# Patient Record
Sex: FEMALE | Race: WHITE | NOT HISPANIC OR LATINO | Employment: FULL TIME | URBAN - METROPOLITAN AREA
[De-identification: names, ages, dates, MRNs, and addresses within clinical notes are randomized per-mention and may not be internally consistent; named-entity substitution may affect disease eponyms.]

---

## 2017-01-09 ENCOUNTER — OFFICE VISIT (OUTPATIENT)
Dept: OBSTETRICS AND GYNECOLOGY | Facility: CLINIC | Age: 52
End: 2017-01-09
Payer: COMMERCIAL

## 2017-01-09 VITALS
BODY MASS INDEX: 36.93 KG/M2 | DIASTOLIC BLOOD PRESSURE: 72 MMHG | SYSTOLIC BLOOD PRESSURE: 100 MMHG | WEIGHT: 215.19 LBS

## 2017-01-09 DIAGNOSIS — Z01.419 WELL WOMAN EXAM WITH ROUTINE GYNECOLOGICAL EXAM: Primary | ICD-10-CM

## 2017-01-09 DIAGNOSIS — Z12.31 VISIT FOR SCREENING MAMMOGRAM: ICD-10-CM

## 2017-01-09 DIAGNOSIS — Z78.0 POSTMENOPAUSAL STATUS: ICD-10-CM

## 2017-01-09 DIAGNOSIS — Z12.4 PAP SMEAR FOR CERVICAL CANCER SCREENING: ICD-10-CM

## 2017-01-09 PROCEDURE — 99396 PREV VISIT EST AGE 40-64: CPT | Mod: S$GLB,,, | Performed by: OBSTETRICS & GYNECOLOGY

## 2017-01-09 PROCEDURE — 88175 CYTOPATH C/V AUTO FLUID REDO: CPT | Performed by: PATHOLOGY

## 2017-01-09 PROCEDURE — 99999 PR PBB SHADOW E&M-EST. PATIENT-LVL III: CPT | Mod: PBBFAC,,, | Performed by: OBSTETRICS & GYNECOLOGY

## 2017-01-09 PROCEDURE — 88141 CYTOPATH C/V INTERPRET: CPT | Mod: ,,, | Performed by: PATHOLOGY

## 2017-01-09 RX ORDER — PRAVASTATIN SODIUM 10 MG/1
10 TABLET ORAL DAILY
Refills: 3 | COMMUNITY
Start: 2016-12-19 | End: 2020-10-09 | Stop reason: SDUPTHER

## 2017-01-09 RX ORDER — TRAZODONE HYDROCHLORIDE 50 MG/1
50 TABLET ORAL NIGHTLY PRN
Refills: 3 | COMMUNITY
Start: 2016-12-13 | End: 2020-05-13

## 2017-01-09 NOTE — PROGRESS NOTES
Jacqui Leonard is a 51 y.o. year old  female who presents for routine GYN exam.  She is postmenopausal, not on HRT.  No bleeding.  Reports occasional hot flashes, mood swings.  S/P LEEP  with normal paps since.  Denies changes in her medical / surgical history over the past year.        Past Medical History   Diagnosis Date    Abnormal Pap smear     Chronic kidney disease     Depression     GERD (gastroesophageal reflux disease)     Hypertension        Past Surgical History   Procedure Laterality Date    Cervical biopsy  w/ loop electrode excision      Tubal ligation         OB History      Para Term  AB TAB SAB Ectopic Multiple Living    1 1                    ROS:  GENERAL: Feeling well overall.   SKIN: Denies rash or lesions.   HEAD: Denies head injury or headache.   NODES: Denies enlarged lymph nodes.   CHEST: Denies chest pain or shortness of breath.   CARDIOVASCULAR: Denies palpitations or left sided chest pain.   ABDOMEN: No abdominal pain, nausea, vomiting or rectal bleeding.   URINARY: No dysuria or hematuria.  REPRODUCTIVE: See HPI.   BREASTS: Denies pain, lumps, or nipple discharge.   HEMATOLOGIC: No easy bruisability or excessive bleeding.   MUSCULOSKELETAL: Denies joint pain.  NEUROLOGIC: Denies syncope or weakness.   PSYCHIATRIC: Reports mood swings.     PE:   (chaperone present during entire exam)  APPEARANCE: Well nourished, well developed, in no acute distress.  BREASTS: Symmetrical, no skin changes or visible lesions. No palpable masses, nipple discharge or adenopathy bilaterally.  ABDOMEN: Soft. No tenderness or masses. No hernias. No CVA tenderness.  VULVA: No lesions. Normal female genitalia.  URETHRAL MEATUS: Normal size and location, no lesions, no prolapse.  URETHRA: No masses, tenderness, prolapse or scarring.  VAGINA: No lesions, no discharge, no significant cystocele or rectocele.  CERVIX: No lesions and discharge. Stenotic. PAP done.  UTERUS: Normal  "size, regular shape, mobile, non-tender, bladder base nontender.  ADNEXA: No masses, tenderness or CDS nodularity.  ANUS PERINEUM: Normal.    Diagnosis:  1. Well woman exam with routine gynecological exam    2. Postmenopausal status    3. Pap smear for cervical cancer screening    4. Visit for screening mammogram          PLAN:    Orders Placed This Encounter    Mammo Digital Screening Bilat with Tomosynthesis CAD    Liquid-based pap smear, screening       Patient was counseled today on postmenopausal issues.  We reviewed various treatment options for her menopausal symptoms: 1) no treatment  2) non-hormonal options- [place]  3) " natural" hormones  4) HRT: risks / benefits / studies, [facility].  She plans to try Black Cohosh and will let us know her progress.      Follow-up in 1 year.  "

## 2017-01-10 ENCOUNTER — HOSPITAL ENCOUNTER (OUTPATIENT)
Dept: RADIOLOGY | Facility: HOSPITAL | Age: 52
Discharge: HOME | End: 2017-01-10
Attending: OBSTETRICS & GYNECOLOGY
Payer: COMMERCIAL

## 2017-01-10 DIAGNOSIS — Z12.31 VISIT FOR SCREENING MAMMOGRAM: ICD-10-CM

## 2017-01-10 PROCEDURE — 77067 SCR MAMMO BI INCL CAD: CPT | Mod: TC

## 2017-01-10 PROCEDURE — 77067 SCR MAMMO BI INCL CAD: CPT | Mod: 26,,, | Performed by: RADIOLOGY

## 2017-01-11 ENCOUNTER — PATIENT MESSAGE (OUTPATIENT)
Dept: OBSTETRICS AND GYNECOLOGY | Facility: CLINIC | Age: 52
End: 2017-01-11

## 2017-01-19 ENCOUNTER — TELEPHONE (OUTPATIENT)
Dept: OBSTETRICS AND GYNECOLOGY | Facility: CLINIC | Age: 52
End: 2017-01-19

## 2017-01-19 NOTE — TELEPHONE ENCOUNTER
----- Message from Skylar Wilkins sent at 1/19/2017  1:49 PM CST -----  Contact: Patient  x_ 1st Request  _ 2nd Request  _ 3rd Request    Who: ALCIRA AGUILAR [[id number]]    Why: Patient is returning a call from Dr. Lu. Patient is needing a call back from staff.    What Number to Call Back: Patient can be reached at [phone].    When to Expect a call back: (Before the end of the day)  -- if call after 3:00 call back will be tomorrow.

## 2017-01-24 ENCOUNTER — TELEPHONE (OUTPATIENT)
Dept: OBSTETRICS AND GYNECOLOGY | Facility: CLINIC | Age: 52
End: 2017-01-24

## 2017-01-24 NOTE — TELEPHONE ENCOUNTER
Called patient:    Discussed results of pap from 1/9/17:  ONIEL    She has history of LEEP 2011.    REC: Colpo - scheduled 2/1/17.

## 2017-02-01 ENCOUNTER — PROCEDURE VISIT (OUTPATIENT)
Dept: OBSTETRICS AND GYNECOLOGY | Facility: CLINIC | Age: 52
End: 2017-02-01
Attending: OBSTETRICS & GYNECOLOGY
Payer: COMMERCIAL

## 2017-02-01 VITALS
WEIGHT: 219.81 LBS | SYSTOLIC BLOOD PRESSURE: 104 MMHG | HEIGHT: 64 IN | DIASTOLIC BLOOD PRESSURE: 76 MMHG | BODY MASS INDEX: 37.52 KG/M2

## 2017-02-01 DIAGNOSIS — Z98.890 HISTORY OF LOOP ELECTRICAL EXCISION PROCEDURE (LEEP): ICD-10-CM

## 2017-02-01 DIAGNOSIS — R87.612 LGSIL ON PAP SMEAR OF CERVIX: Primary | ICD-10-CM

## 2017-02-01 DIAGNOSIS — Z78.0 POSTMENOPAUSAL STATUS: ICD-10-CM

## 2017-02-01 PROCEDURE — 88305 TISSUE EXAM BY PATHOLOGIST: CPT | Mod: 59 | Performed by: PATHOLOGY

## 2017-02-01 PROCEDURE — 57454 BX/CURETT OF CERVIX W/SCOPE: CPT | Mod: S$GLB,,, | Performed by: OBSTETRICS & GYNECOLOGY

## 2017-02-01 PROCEDURE — 88305 TISSUE EXAM BY PATHOLOGIST: CPT | Mod: 26,,, | Performed by: PATHOLOGY

## 2017-02-01 NOTE — PROCEDURES
COLPOSCOPY:    Jacqui Leonard is a 51 y.o. female who presents for a colposcopy secondary to abnormal pap smear.  Pap performed at her annual exam returned LGSIL.  She has history of LEEP performed 2011 with subsequent normal paps until her most recent.  She is postmenopausal without having a period for several years.      UPT is negative.        PRE-COLPOSCOPY PROCEDURE COUNSELING:  Discussed the abnormal pap test findings, HPV, need for colposcopy and possible biopsies to determine a diagnosis and plan of care, treatments available, the minimal risks of bleeding and infection with a colposcopy, alternatives to colposcopy and she agrees to proceed.  Patient was given the opportunity to ask questions and written consent was obtained.        COLPOSCOPY EXAM:   TIME OUT PERFORMED.   Cervix visualized with speculum  Acetic acid applied to cervix  Entire transformation zone seen  Cervix stenotic / scarring closed even with blue os finder  Minimal faint white epithelium noted at 5:00-6:00  Biopsy was taken at 6:00  ECC attempted with brush, but cervix stenotic.     Hemostatic with silver nitrate.  Minimal blood loss.      The speculum was removed.   The patient tolerated the procedure well.    There were no complications.      IMPRESSION:   Abnormal Pap: LGSIL  History of LEEP 2011  Postmenopausal  Cervical stenosis      POST COLPOSCOPY COUNSELING:   Manage post colposcopy cramping with NSAIDs or Tylenol.  Avoid anything in vagina (intercourse, douching, tampons) one week after the procedure.  Report bleeding heavier than a period, worsening pain, fever > 101.0 F, or foul-smelling vaginal discharge.  Importance of follow-up stressed.      FOLLOW-UP:   To call us in several days for biopsy results and to discuss treatment plan.

## 2017-02-06 ENCOUNTER — TELEPHONE (OUTPATIENT)
Dept: OBSTETRICS AND GYNECOLOGY | Facility: CLINIC | Age: 52
End: 2017-02-06

## 2017-02-06 NOTE — TELEPHONE ENCOUNTER
Called patient:    Discussed results of colpo;    FINAL PATHOLOGIC DIAGNOSIS  1. Cervix, 6:00, biopsy:  Mild squamous dysplasia with koilocytic atypia. (FELY I).  2. Endocervix, curettage:  Specimen consists of an extremely scant fragments of keratin debris.  No endocervical component is present for evaluation, therefore the specimen is insufficient for further  diagnosis. Correlate clinically.  Comment:  This case correlates with the patient's previous Pap smear, case number [id number].    Prior LGSIL pap- stenotic cervix.    REC:  Repeat pap in 6 months - I emphasized the importance of follow-up.

## 2017-02-06 NOTE — TELEPHONE ENCOUNTER
----- Message from Morteza Lu MD sent at 2/6/2017 11:54 AM CST -----  Please send reminder letter for pap 8/2017.  Thanks.

## 2017-02-23 RX ORDER — ENALAPRIL MALEATE 10 MG/1
TABLET ORAL
Qty: 90 TABLET | Refills: 0 | OUTPATIENT
Start: 2017-02-23

## 2017-10-28 ENCOUNTER — OFFICE VISIT (OUTPATIENT)
Dept: URGENT CARE | Facility: CLINIC | Age: 52
End: 2017-10-28
Payer: COMMERCIAL

## 2017-10-28 VITALS
TEMPERATURE: 99 F | SYSTOLIC BLOOD PRESSURE: 125 MMHG | HEART RATE: 70 BPM | BODY MASS INDEX: 37.56 KG/M2 | OXYGEN SATURATION: 97 % | DIASTOLIC BLOOD PRESSURE: 71 MMHG | WEIGHT: 220 LBS | HEIGHT: 64 IN | RESPIRATION RATE: 16 BRPM

## 2017-10-28 DIAGNOSIS — J40 BRONCHITIS: Primary | ICD-10-CM

## 2017-10-28 PROCEDURE — 96372 THER/PROPH/DIAG INJ SC/IM: CPT | Mod: S$GLB,,, | Performed by: EMERGENCY MEDICINE

## 2017-10-28 PROCEDURE — 99203 OFFICE O/P NEW LOW 30 MIN: CPT | Mod: 25,S$GLB,, | Performed by: NURSE PRACTITIONER

## 2017-10-28 RX ORDER — BENZONATATE 200 MG/1
200 CAPSULE ORAL 3 TIMES DAILY PRN
Qty: 30 CAPSULE | Refills: 0 | Status: SHIPPED | OUTPATIENT
Start: 2017-10-28 | End: 2017-11-07

## 2017-10-28 RX ORDER — ALBUTEROL SULFATE 90 UG/1
2 AEROSOL, METERED RESPIRATORY (INHALATION) EVERY 6 HOURS PRN
Qty: 18 G | Refills: 0 | Status: SHIPPED | OUTPATIENT
Start: 2017-10-28 | End: 2019-01-15

## 2017-10-28 RX ORDER — BETAMETHASONE SODIUM PHOSPHATE AND BETAMETHASONE ACETATE 3; 3 MG/ML; MG/ML
6 INJECTION, SUSPENSION INTRA-ARTICULAR; INTRALESIONAL; INTRAMUSCULAR; SOFT TISSUE
Status: COMPLETED | OUTPATIENT
Start: 2017-10-28 | End: 2017-10-28

## 2017-10-28 RX ORDER — AZITHROMYCIN 250 MG/1
TABLET, FILM COATED ORAL
Qty: 6 TABLET | Refills: 0 | Status: SHIPPED | OUTPATIENT
Start: 2017-10-28 | End: 2018-01-23

## 2017-10-28 RX ADMIN — BETAMETHASONE SODIUM PHOSPHATE AND BETAMETHASONE ACETATE 6 MG: 3; 3 INJECTION, SUSPENSION INTRA-ARTICULAR; INTRALESIONAL; INTRAMUSCULAR; SOFT TISSUE at 04:10

## 2017-10-28 NOTE — PATIENT INSTRUCTIONS
Please return here or go to the Emergency Department for any concerns or worsening of condition.  If you were prescribed antibiotics, please take them to completion.  If you were prescribed a narcotic medication, do not drive or operate heavy equipment or machinery while taking these medications.  Please follow up with your primary care doctor or specialist as needed.    If you  smoke, please stop smoking.  Bronchitis with Wheezing (Viral or Bacterial: Adult)    Bronchitis is an infection of the air passages. It often occurs during a cold and is usually caused by a virus. Symptoms include cough with mucus (phlegm) and low-grade fever. This illness is contagious during the first few days and is spread through the air by coughing and sneezing, or by direct contact (touching the sick person and then touching your own eyes, nose, or mouth).  If there is a lot of inflammation, air flow is restricted. The air passages may also go into spasm, especially if you have asthma. This causes wheezing and difficulty breathing even in people who do not have asthma.  Bronchitis usually lasts 7 to 14 days. The wheezing should improve with treatment during the first week. An inhaler is often prescribed to relax the air passages and stop wheezing. Antibiotics will be prescribed if your doctor thinks there is also a secondary bacterial infection.  Home care  · If symptoms are severe, rest at home for the first 2 to 3 days. When you go back to your usual activities, don't let yourself get too tired.  · Do not smoke. Also avoid being exposed to secondhand smoke.  · You may use over-the-counter medicine to control fever or pain, unless another medicine was prescribed. Note: If you have chronic liver or kidney disease or have ever had a stomach ulcer or gastrointestinal bleeding, talk with your healthcare provider before using these medicines. Also talk to your provider if you are taking medicine to prevent blood clots.) Aspirin should  never be given to anyone younger than 18 years of age who is ill with a viral infection or fever. It may cause severe liver or brain damage.  · Your appetite may be poor, so a light diet is fine. Avoid dehydration by drinking 6 to 8 glasses of fluids per day (such as water, soft drinks, sports drinks, juices, tea, or soup). Extra fluids will help loosen secretions in the nose and lungs.  · Over-the-counter cough, cold, and sore-throat medicines will not shorten the length of the illness, but they may be helpful to reduce symptoms. (Note: Do not use decongestants if you have high blood pressure.)  · If you were given an inhaler, use it exactly as directed. If you need to use it more often than prescribed, your condition may be worsening. If this happens, contact your healthcare provider.  · If prescribed, finish all antibiotic medicine, even if you are feeling better after only a few days.  Follow-up care  Follow up with your healthcare provider, or as advised. If you had an X-ray or ECG (electrocardiogram), a specialist will review it. You will be notified of any new findings that may affect your care.  Note: If you are age 65 or older, or if you have a chronic lung disease or condition that affects your immune system, or you smoke, talk to your healthcare provider about having a pneumococcal vaccinations and a yearly influenza vaccination (flu shot).  When to seek medical advice  Call your healthcare provider right away if any of these occur:  · Fever of 100.4°F (38°C) or higher  · Coughing up increasing amounts of colored sputum  · Weakness, drowsiness, headache, facial pain, ear pain, or a stiff neck  Call 911, or get immediate medical care  Contact emergency services right away if any of these occur.  · Coughing up blood  · Worsening weakness, drowsiness, headache, or stiff neck  · Increased wheezing not helped with medication, shortness of breath, or pain with breathing  Date Last Reviewed: 9/13/2015  ©  7698-7109 [employer]. [address] [place] [address], [place] [address]. All rights reserved. This information is not intended as a substitute for professional medical care. Always follow your healthcare professional's instructions.

## 2017-10-28 NOTE — PROGRESS NOTES
"Subjective:       Patient ID: Jacqui Leonard is a 51 y.o. female.    Vitals:  height is 5' 4" (1.626 m) and weight is 99.8 kg (220 lb). Her oral temperature is 98.5 °F (36.9 °C). Her blood pressure is 125/71 and her pulse is 70. Her respiration is 16 and oxygen saturation is 97%.     Chief Complaint: Nasal Congestion    Other   This is a new problem. The current episode started 1 to 4 weeks ago. The problem occurs constantly. Associated symptoms include congestion, coughing and a sore throat. Pertinent negatives include no abdominal pain, chest pain, chills, fever, headaches, myalgias or nausea. The symptoms are aggravated by coughing. Treatments tried: otc allergy med. The treatment provided no relief.     Review of Systems   Constitution: Negative for chills, fever and malaise/fatigue.   HENT: Positive for congestion and sore throat. Negative for ear pain and hoarse voice.    Eyes: Negative for discharge and redness.   Cardiovascular: Negative for chest pain, dyspnea on exertion and leg swelling.   Respiratory: Positive for cough and shortness of breath. Negative for sputum production and wheezing.    Musculoskeletal: Negative for myalgias.   Gastrointestinal: Negative for abdominal pain and nausea.   Neurological: Negative for headaches.       Objective:      Physical Exam   Constitutional: She is oriented to person, place, and time. She appears well-developed and well-nourished. She is cooperative.  Non-toxic appearance. She does not appear ill. No distress.   HENT:   Head: Normocephalic and atraumatic.   Right Ear: Hearing, tympanic membrane, external ear and ear canal normal.   Left Ear: Hearing, tympanic membrane, external ear and ear canal normal.   Nose: Nose normal. No mucosal edema, rhinorrhea or nasal deformity. No epistaxis. Right sinus exhibits no maxillary sinus tenderness and no frontal sinus tenderness. Left sinus exhibits no maxillary sinus tenderness and no frontal sinus tenderness. "   Mouth/Throat: Uvula is midline, oropharynx is clear and moist and mucous membranes are normal. No trismus in the jaw. Normal dentition. No uvula swelling. No posterior oropharyngeal erythema.   Eyes: Conjunctivae and lids are normal. No scleral icterus.   Sclera clear bilat   Neck: Trachea normal, full passive range of motion without pain and phonation normal. Neck supple.   Cardiovascular: Normal rate, regular rhythm, normal heart sounds, intact distal pulses and normal pulses.    Pulmonary/Chest: Effort normal. No respiratory distress. She has wheezes in the right upper field and the left upper field.   Abdominal: Soft. Normal appearance and bowel sounds are normal. She exhibits no distension. There is no tenderness.   Musculoskeletal: Normal range of motion. She exhibits no edema or deformity.   Neurological: She is alert and oriented to person, place, and time. She exhibits normal muscle tone. Coordination normal.   Skin: Skin is warm, dry and intact. She is not diaphoretic. No pallor.   Psychiatric: She has a normal mood and affect. Her speech is normal and behavior is normal. Judgment and thought content normal. Cognition and memory are normal.   Nursing note and vitals reviewed.      Assessment:       1. Bronchitis        Plan:         Bronchitis  -     betamethasone acetate-betamethasone sodium phosphate injection 6 mg; Inject 1 mL (6 mg total) into the muscle one time.  -     azithromycin (ZITHROMAX Z-BENJAMIN) 250 MG tablet; TAKE 2 TABLETS ON DAY 1, THEN 1 TABLET DAILY X 4 DAYS.  Dispense: 6 tablet; Refill: 0  -     benzonatate (TESSALON) 200 MG capsule; Take 1 capsule (200 mg total) by mouth 3 (three) times daily as needed for Cough.  Dispense: 30 capsule; Refill: 0  -     albuterol 90 mcg/actuation inhaler; Inhale 2 puffs into the lungs every 6 (six) hours as needed for Wheezing or Shortness of Breath. Rescue  Dispense: 18 g; Refill: 0    Please return here or go to the Emergency Department for any concerns  or worsening of condition.  If you were prescribed antibiotics, please take them to completion.  If you were prescribed a narcotic medication, do not drive or operate heavy equipment or machinery while taking these medications.  Please follow up with your primary care doctor or specialist as needed.    If you  smoke, please stop smoking.

## 2018-01-12 ENCOUNTER — PATIENT MESSAGE (OUTPATIENT)
Dept: NEPHROLOGY | Facility: CLINIC | Age: 53
End: 2018-01-12

## 2018-01-12 NOTE — TELEPHONE ENCOUNTER
Hi.  Let's get her a 40 minute appointment slot since I have not seen her in 2 years.    Thanks!

## 2018-01-23 ENCOUNTER — DOCUMENTATION ONLY (OUTPATIENT)
Dept: NEPHROLOGY | Facility: CLINIC | Age: 53
End: 2018-01-23

## 2018-01-23 ENCOUNTER — OFFICE VISIT (OUTPATIENT)
Dept: NEPHROLOGY | Facility: CLINIC | Age: 53
End: 2018-01-23
Payer: COMMERCIAL

## 2018-01-23 VITALS
HEIGHT: 64 IN | HEART RATE: 82 BPM | SYSTOLIC BLOOD PRESSURE: 114 MMHG | BODY MASS INDEX: 38.35 KG/M2 | WEIGHT: 224.63 LBS | OXYGEN SATURATION: 98 % | DIASTOLIC BLOOD PRESSURE: 71 MMHG

## 2018-01-23 DIAGNOSIS — N18.30 CKD (CHRONIC KIDNEY DISEASE) STAGE 3, GFR 30-59 ML/MIN: Primary | ICD-10-CM

## 2018-01-23 DIAGNOSIS — R80.9 PROTEINURIA, UNSPECIFIED TYPE: ICD-10-CM

## 2018-01-23 PROCEDURE — 99999 PR PBB SHADOW E&M-EST. PATIENT-LVL III: CPT | Mod: PBBFAC,,, | Performed by: INTERNAL MEDICINE

## 2018-01-23 PROCEDURE — 99214 OFFICE O/P EST MOD 30 MIN: CPT | Mod: S$GLB,,, | Performed by: INTERNAL MEDICINE

## 2018-01-23 RX ORDER — SERTRALINE HYDROCHLORIDE 50 MG/1
50 TABLET, FILM COATED ORAL DAILY
Refills: 1 | COMMUNITY
Start: 2018-01-15 | End: 2019-01-15

## 2018-01-23 NOTE — PROGRESS NOTES
"Subjective:       Patient ID: Jacqui Leonard is a 52 y.o. White female who presents for return patient evaluation for chronic renal failure.    She has no uremic or urinary symptoms and is in her usual state of health.  There have been no recent illnesses, hospitalizations or procedures.  I did discuss with her the 30+ pound weigh gain she has had since 12/2014.  She denies NSAID use.      Review of Systems   Constitutional: Positive for fatigue. Negative for appetite change, chills and fever.   Eyes: Negative for visual disturbance.   Respiratory: Positive for shortness of breath (with exertion). Negative for cough.    Cardiovascular: Negative for chest pain and leg swelling.   Gastrointestinal: Negative for nausea.   Genitourinary: Negative for difficulty urinating, dysuria and hematuria.   Musculoskeletal: Positive for arthralgias (L shoulder) and gait problem.   Skin: Negative for rash.   Neurological: Negative for headaches.   Psychiatric/Behavioral: Positive for sleep disturbance.       The past medical, family and social histories were reviewed for this encounter.     /71 (BP Location: Right arm, Patient Position: Sitting)   Pulse 82   Ht 5' 4" (1.626 m)   Wt 101.9 kg (224 lb 10.4 oz)   SpO2 98%   BMI 38.56 kg/m²     Objective:      Physical Exam   Constitutional: She appears well-developed and well-nourished. No distress.   HENT:   Head: Normocephalic and atraumatic.   Eyes: Conjunctivae are normal. No scleral icterus.   Neck: Normal range of motion. No JVD present.   Cardiovascular: Normal rate, regular rhythm and normal heart sounds.  Exam reveals no gallop and no friction rub.    No murmur heard.  Pulmonary/Chest: Effort normal and breath sounds normal. No respiratory distress. She has no wheezes.   Abdominal: Soft. Bowel sounds are normal. She exhibits no distension (obese). There is no tenderness.   Musculoskeletal: She exhibits edema (trace RLE, 1+ LLE).   Skin: Skin is warm and dry. " No rash noted.   Psychiatric: She has a normal mood and affect.   Vitals reviewed.      Assessment:       1. CKD (chronic kidney disease) stage 3, GFR 30-59 ml/min    2. Proteinuria, unspecified type        Plan:   Return to clinic in 3 months.  Labs for next visit include rp, pth, ua, upc, cbc at the [place] location.  Baseline creatinine is 1.2-1.5.  [facility] labs from 1/9/18:  Na 145, Scr 1.8, K 5.3, Cl 105, CO 26, Alb 3.9, Glu 92, WBC 5.4, Hgb 11.1, chol 247, OHD 26.  UA-1.013/5.5/2+ prot.  I asked her to try to get off of her PPI and use pepcid or zantac.  She has been a bit lost to follow-up thus I do not know if her creatinine of 1.8 is isolated or is part of a trend of worsening renal function.  She denies NSAIDs or new medications which may have rocked the boat.

## 2018-02-07 ENCOUNTER — OFFICE VISIT (OUTPATIENT)
Dept: OBSTETRICS AND GYNECOLOGY | Facility: CLINIC | Age: 53
End: 2018-02-07
Attending: OBSTETRICS & GYNECOLOGY
Payer: COMMERCIAL

## 2018-02-07 VITALS
WEIGHT: 227.5 LBS | DIASTOLIC BLOOD PRESSURE: 82 MMHG | SYSTOLIC BLOOD PRESSURE: 122 MMHG | HEIGHT: 64 IN | BODY MASS INDEX: 38.84 KG/M2

## 2018-02-07 DIAGNOSIS — Z12.4 PAP SMEAR FOR CERVICAL CANCER SCREENING: ICD-10-CM

## 2018-02-07 DIAGNOSIS — Z78.0 POSTMENOPAUSAL STATUS: ICD-10-CM

## 2018-02-07 DIAGNOSIS — Z12.31 VISIT FOR SCREENING MAMMOGRAM: ICD-10-CM

## 2018-02-07 DIAGNOSIS — Z01.419 WELL WOMAN EXAM WITH ROUTINE GYNECOLOGICAL EXAM: Primary | ICD-10-CM

## 2018-02-07 DIAGNOSIS — Z98.890 HISTORY OF LOOP ELECTRICAL EXCISION PROCEDURE (LEEP): ICD-10-CM

## 2018-02-07 PROCEDURE — 99396 PREV VISIT EST AGE 40-64: CPT | Mod: S$GLB,,, | Performed by: OBSTETRICS & GYNECOLOGY

## 2018-02-07 PROCEDURE — 99999 PR PBB SHADOW E&M-EST. PATIENT-LVL III: CPT | Mod: PBBFAC,,, | Performed by: OBSTETRICS & GYNECOLOGY

## 2018-02-07 PROCEDURE — 88175 CYTOPATH C/V AUTO FLUID REDO: CPT

## 2018-02-07 NOTE — PROGRESS NOTES
Jacqui Leonard is a 52 y.o. year old  female who presents for routine GYN exam.  She is postmenopausal, not on HRT.  No bleeding.  Infrequent hot flashes.  She has noted weight gain over the past year.  She has a history of FELY I on colpo, followed conservatively.  S/P LEEP .  Denies recent changes in her medical / surgical history.     Summary:   LEEP  17 Pap LGSIL, possible HGSIL  17 Colpo: FELY I      Past Medical History:   Diagnosis Date    Abnormal Pap smear     Chronic kidney disease     Depression     GERD (gastroesophageal reflux disease)     Hypertension        Past Surgical History:   Procedure Laterality Date    CERVICAL BIOPSY  W/ LOOP ELECTRODE EXCISION      FOOT SURGERY      x2    TUBAL LIGATION         OB History      Para Term  AB Living    1 1       1    SAB TAB Ectopic Multiple Live Births                         ROS:  GENERAL: Reports weight gain.   SKIN: Denies rash or lesions.   HEAD: Denies head injury or headache.   NODES: Denies enlarged lymph nodes.   CHEST: Denies chest pain or shortness of breath.   CARDIOVASCULAR: Denies palpitations or left sided chest pain.   ABDOMEN: No abdominal pain, nausea, vomiting or rectal bleeding.   URINARY: No dysuria or hematuria.  REPRODUCTIVE: See HPI.   BREASTS: Denies pain, lumps, or nipple discharge.   HEMATOLOGIC: No easy bruisability or excessive bleeding.   MUSCULOSKELETAL: Denies joint pain.  NEUROLOGIC: Denies syncope or weakness.   PSYCHIATRIC: Denies depression.     PE:   (chaperone present during entire exam)  APPEARANCE: Well nourished, well developed, in no acute distress.  BREASTS: Symmetrical, no skin changes or visible lesions. No palpable masses, nipple discharge or adenopathy bilaterally.  ABDOMEN: Soft. No tenderness or masses. No hernias. No CVA tenderness.  VULVA: No lesions. Normal female genitalia.  URETHRAL MEATUS: Normal size and location, no lesions, no prolapse.  URETHRA: No masses,  tenderness, prolapse or scarring.  VAGINA: Atrophic.  No lesions, no discharge, no significant cystocele or rectocele.  CERVIX: No lesions and discharge. Stenotic / agglutinated.  PAP done.  UTERUS: Normal size, regular shape, mobile, non-tender, bladder base nontender.  ADNEXA: No masses, tenderness or CDS nodularity.  ANUS PERINEUM: Normal.    Diagnosis:  1. Well woman exam with routine gynecological exam    2. Postmenopausal status    3. Pap smear for cervical cancer screening    4. Visit for screening mammogram    5. History of loop electrical excision procedure (LEEP)          PLAN:    Orders Placed This Encounter    Mammo Digital Screening Bilat with Jaylan without CAD    Liquid-based pap smear, screening       Patient was counseled today on postmenopausal issues.  We reviewed her pap / colpo / LEEP history.  If today's pap is normal, she should return in 6 months for another pap.    Follow-up in 6 months for pap / 1 year for annual exam.

## 2018-02-08 ENCOUNTER — APPOINTMENT (OUTPATIENT)
Dept: RADIOLOGY | Facility: OTHER | Age: 53
End: 2018-02-08
Attending: OBSTETRICS & GYNECOLOGY
Payer: COMMERCIAL

## 2018-02-08 DIAGNOSIS — Z12.31 VISIT FOR SCREENING MAMMOGRAM: ICD-10-CM

## 2018-02-08 PROCEDURE — 77067 SCR MAMMO BI INCL CAD: CPT | Mod: 26,,, | Performed by: RADIOLOGY

## 2018-02-08 PROCEDURE — 77067 SCR MAMMO BI INCL CAD: CPT | Mod: TC,PN

## 2018-02-08 PROCEDURE — 77063 BREAST TOMOSYNTHESIS BI: CPT | Mod: 26,,, | Performed by: RADIOLOGY

## 2018-02-09 ENCOUNTER — PATIENT MESSAGE (OUTPATIENT)
Dept: OBSTETRICS AND GYNECOLOGY | Facility: CLINIC | Age: 53
End: 2018-02-09

## 2018-02-14 ENCOUNTER — PATIENT MESSAGE (OUTPATIENT)
Dept: OBSTETRICS AND GYNECOLOGY | Facility: CLINIC | Age: 53
End: 2018-02-14

## 2019-01-15 ENCOUNTER — OFFICE VISIT (OUTPATIENT)
Dept: URGENT CARE | Facility: CLINIC | Age: 54
End: 2019-01-15
Payer: COMMERCIAL

## 2019-01-15 VITALS
OXYGEN SATURATION: 98 % | WEIGHT: 225 LBS | BODY MASS INDEX: 38.41 KG/M2 | HEART RATE: 70 BPM | DIASTOLIC BLOOD PRESSURE: 79 MMHG | SYSTOLIC BLOOD PRESSURE: 115 MMHG | TEMPERATURE: 98 F | HEIGHT: 64 IN | RESPIRATION RATE: 18 BRPM

## 2019-01-15 DIAGNOSIS — J06.9 VIRAL URI WITH COUGH: Primary | ICD-10-CM

## 2019-01-15 LAB
CTP QC/QA: YES
FLUAV AG NPH QL: NEGATIVE
FLUBV AG NPH QL: NEGATIVE

## 2019-01-15 PROCEDURE — 3008F PR BODY MASS INDEX (BMI) DOCUMENTED: ICD-10-PCS | Mod: CPTII,S$GLB,, | Performed by: NURSE PRACTITIONER

## 2019-01-15 PROCEDURE — 96372 PR INJECTION,THERAP/PROPH/DIAG2ST, IM OR SUBCUT: ICD-10-PCS | Mod: S$GLB,,, | Performed by: FAMILY MEDICINE

## 2019-01-15 PROCEDURE — 87804 POCT INFLUENZA A/B: ICD-10-PCS | Mod: QW,S$GLB,, | Performed by: NURSE PRACTITIONER

## 2019-01-15 PROCEDURE — 99214 PR OFFICE/OUTPT VISIT, EST, LEVL IV, 30-39 MIN: ICD-10-PCS | Mod: 25,S$GLB,, | Performed by: NURSE PRACTITIONER

## 2019-01-15 PROCEDURE — 96372 THER/PROPH/DIAG INJ SC/IM: CPT | Mod: S$GLB,,, | Performed by: FAMILY MEDICINE

## 2019-01-15 PROCEDURE — 3008F BODY MASS INDEX DOCD: CPT | Mod: CPTII,S$GLB,, | Performed by: NURSE PRACTITIONER

## 2019-01-15 PROCEDURE — 99214 OFFICE O/P EST MOD 30 MIN: CPT | Mod: 25,S$GLB,, | Performed by: NURSE PRACTITIONER

## 2019-01-15 PROCEDURE — 87804 INFLUENZA ASSAY W/OPTIC: CPT | Mod: QW,S$GLB,, | Performed by: NURSE PRACTITIONER

## 2019-01-15 RX ORDER — AZELASTINE 1 MG/ML
1 SPRAY, METERED NASAL 2 TIMES DAILY
Qty: 30 ML | Refills: 0 | Status: SHIPPED | OUTPATIENT
Start: 2019-01-15 | End: 2020-05-13

## 2019-01-15 RX ORDER — PREDNISONE 20 MG/1
20 TABLET ORAL DAILY
Qty: 3 TABLET | Refills: 0 | Status: SHIPPED | OUTPATIENT
Start: 2019-01-15 | End: 2019-01-18

## 2019-01-15 RX ORDER — CETIRIZINE HYDROCHLORIDE, PSEUDOEPHEDRINE HYDROCHLORIDE 5; 120 MG/1; MG/1
1 TABLET, FILM COATED, EXTENDED RELEASE ORAL 2 TIMES DAILY
Qty: 20 TABLET | Refills: 0 | Status: SHIPPED | OUTPATIENT
Start: 2019-01-15 | End: 2019-01-25

## 2019-01-15 RX ORDER — BETAMETHASONE SODIUM PHOSPHATE AND BETAMETHASONE ACETATE 3; 3 MG/ML; MG/ML
9 INJECTION, SUSPENSION INTRA-ARTICULAR; INTRALESIONAL; INTRAMUSCULAR; SOFT TISSUE
Status: COMPLETED | OUTPATIENT
Start: 2019-01-15 | End: 2019-01-15

## 2019-01-15 RX ADMIN — BETAMETHASONE SODIUM PHOSPHATE AND BETAMETHASONE ACETATE 9 MG: 3; 3 INJECTION, SUSPENSION INTRA-ARTICULAR; INTRALESIONAL; INTRAMUSCULAR; SOFT TISSUE at 12:01

## 2019-01-15 NOTE — PATIENT INSTRUCTIONS
USE ZYRTEC D TWICE A DAY FOR CONGESTION    DO NOT START PREDNISONE UNTIL TOMORROW    USE BOTH NASAL SPRAYS MORNING AND NIGHT (ASTELIN AND FLONASE-OTC)    CONTINUE MUCINEX FOR COUGH- OTC    Viral Upper Respiratory Illness (Adult)  You have a viral upper respiratory illness (URI), which is another term for the common cold. This illness is contagious during the first few days. It is spread through the air by coughing and sneezing. It may also be spread by direct contact (touching the sick person and then touching your own eyes, nose, or mouth). Frequent handwashing will decrease risk of spread. Most viral illnesses go away within 7 to 10 days with rest and simple home remedies. Sometimes the illness may last for several weeks. Antibiotics will not kill a virus, and they are generally not prescribed for this condition.    Home care  · If symptoms are severe, rest at home for the first 2 to 3 days. When you resume activity, don't let yourself get too tired.  · Avoid being exposed to cigarette smoke (yours or others).  · You may use acetaminophen or ibuprofen to control pain and fever, unless another medicine was prescribed. (Note: If you have chronic liver or kidney disease, have ever had a stomach ulcer or gastrointestinal bleeding, or are taking blood-thinning medicines, talk with your healthcare provider before using these medicines.) Aspirin should never be given to anyone under 18 years of age who is ill with a viral infection or fever. It may cause severe liver or brain damage.  · Your appetite may be poor, so a light diet is fine. Avoid dehydration by drinking 6 to 8 glasses of fluids per day (water, soft drinks, juices, tea, or soup). Extra fluids will help loosen secretions in the nose and lungs.  · Over-the-counter cold medicines will not shorten the length of time youre sick, but they may be helpful for the following symptoms: cough, sore throat, and nasal and sinus congestion. (Note: Do not use  decongestants if you have high blood pressure.)  Follow-up care  Follow up with your healthcare provider, or as advised.  When to seek medical advice  Call your healthcare provider right away if any of these occur:  · Cough with lots of colored sputum (mucus)  · Severe headache; face, neck, or ear pain  · Difficulty swallowing due to throat pain  · Fever of 100.4°F (38°C)  Call 911, or get immediate medical care  Call emergency services right away if any of these occur:  · Chest pain, shortness of breath, wheezing, or difficulty breathing  · Coughing up blood  · Inability to swallow due to throat pain  Date Last Reviewed: 9/13/2015  © 3929-4661 [employer]. [address] [place] [address], [place] [address]. All rights reserved. This information is not intended as a substitute for professional medical care. Always follow your healthcare professional's instructions.

## 2019-01-15 NOTE — PROGRESS NOTES
"Subjective:       Patient ID: Jacqui Leonard is a 53 y.o. female.    Vitals:  height is 5' 4" (1.626 m) and weight is 102.1 kg (225 lb). Her temperature is 97.7 °F (36.5 °C). Her blood pressure is 115/79 and her pulse is 70. Her respiration is 18 and oxygen saturation is 98%.     Chief Complaint: Nasal Congestion and Fatigue    Pt reports symptoms began on Thursday and got worse yesterday.       Sinus Problem   This is a new problem. The current episode started yesterday. There has been no fever. Her pain is at a severity of 6/10. The pain is mild. Associated symptoms include congestion and sinus pressure. Pertinent negatives include no diaphoresis. Past treatments include nothing.       Constitution: Positive for fatigue. Negative for sweating.   HENT: Positive for congestion, sinus pain and sinus pressure. Negative for voice change.    Neck: Negative for painful lymph nodes.   Eyes: Negative for eye redness.   Respiratory: Negative for chest tightness, sputum production, COPD, stridor and asthma.    Gastrointestinal: Negative for nausea and vomiting.   Allergic/Immunologic: Negative for seasonal allergies and asthma.   Neurological: Positive for light-headedness.   Hematologic/Lymphatic: Negative for swollen lymph nodes.       Objective:      Physical Exam   Constitutional: She is oriented to person, place, and time. She appears well-developed and well-nourished. She is cooperative.  Non-toxic appearance. She does not appear ill. No distress.   HENT:   Head: Normocephalic and atraumatic.   Right Ear: Hearing, external ear and ear canal normal. A middle ear effusion is present.   Left Ear: Hearing, external ear and ear canal normal. A middle ear effusion is present.   Nose: Mucosal edema and rhinorrhea present. No nasal deformity. No epistaxis. Right sinus exhibits no maxillary sinus tenderness and no frontal sinus tenderness. Left sinus exhibits no maxillary sinus tenderness and no frontal sinus tenderness. "   Mouth/Throat: Uvula is midline, oropharynx is clear and moist and mucous membranes are normal. No trismus in the jaw. Normal dentition. No uvula swelling. No oropharyngeal exudate, posterior oropharyngeal edema or posterior oropharyngeal erythema.   Eyes: Conjunctivae and lids are normal. No scleral icterus.   Sclera clear bilat   Neck: Trachea normal, full passive range of motion without pain and phonation normal. Neck supple.   Cardiovascular: Normal rate, regular rhythm, normal heart sounds, intact distal pulses and normal pulses.   Pulmonary/Chest: Effort normal and breath sounds normal. No stridor. No respiratory distress. She has no decreased breath sounds. She has no wheezes.   Abdominal: Soft. Normal appearance and bowel sounds are normal. She exhibits no distension. There is no tenderness.   Musculoskeletal: Normal range of motion. She exhibits no edema or deformity.   Lymphadenopathy:     She has no cervical adenopathy.   Neurological: She is alert and oriented to person, place, and time. She exhibits normal muscle tone. Coordination normal.   Skin: Skin is warm, dry and intact. She is not diaphoretic. No pallor.   Psychiatric: She has a normal mood and affect. Her speech is normal and behavior is normal. Judgment and thought content normal. Cognition and memory are normal.   Nursing note and vitals reviewed.      Results for orders placed or performed in visit on 01/15/19   POCT Influenza A/B   Result Value Ref Range    Rapid Influenza A Ag Negative Negative    Rapid Influenza B Ag Negative Negative     Acceptable Yes      Assessment:       1. Viral URI with cough        Plan:         Viral URI with cough  -     POCT Influenza A/B  -     betamethasone acetate-betamethasone sodium phosphate injection 9 mg  -     predniSONE (DELTASONE) 20 MG tablet; Take 1 tablet (20 mg total) by mouth once daily. for 3 days  Dispense: 3 tablet; Refill: 0  -     azelastine (ASTELIN) 137 mcg (0.1 %) nasal  spray; 1 spray (137 mcg total) by Nasal route 2 (two) times daily.  Dispense: 30 mL; Refill: 0  -     cetirizine-pseudoephedrine 5-120 mg Tb12; Take 1 tablet by mouth 2 (two) times daily. for 10 days  Dispense: 20 tablet; Refill: 0      Patient Instructions       USE ZYRTEC D TWICE A DAY FOR CONGESTION    DO NOT START PREDNISONE UNTIL TOMORROW    USE BOTH NASAL SPRAYS MORNING AND NIGHT (ASTELIN AND FLONASE-OTC)    CONTINUE MUCINEX FOR COUGH- OTC    Viral Upper Respiratory Illness (Adult)  You have a viral upper respiratory illness (URI), which is another term for the common cold. This illness is contagious during the first few days. It is spread through the air by coughing and sneezing. It may also be spread by direct contact (touching the sick person and then touching your own eyes, nose, or mouth). Frequent handwashing will decrease risk of spread. Most viral illnesses go away within 7 to 10 days with rest and simple home remedies. Sometimes the illness may last for several weeks. Antibiotics will not kill a virus, and they are generally not prescribed for this condition.    Home care  · If symptoms are severe, rest at home for the first 2 to 3 days. When you resume activity, don't let yourself get too tired.  · Avoid being exposed to cigarette smoke (yours or others).  · You may use acetaminophen or ibuprofen to control pain and fever, unless another medicine was prescribed. (Note: If you have chronic liver or kidney disease, have ever had a stomach ulcer or gastrointestinal bleeding, or are taking blood-thinning medicines, talk with your healthcare provider before using these medicines.) Aspirin should never be given to anyone under 18 years of age who is ill with a viral infection or fever. It may cause severe liver or brain damage.  · Your appetite may be poor, so a light diet is fine. Avoid dehydration by drinking 6 to 8 glasses of fluids per day (water, soft drinks, juices, tea, or soup). Extra fluids will  help loosen secretions in the nose and lungs.  · Over-the-counter cold medicines will not shorten the length of time youre sick, but they may be helpful for the following symptoms: cough, sore throat, and nasal and sinus congestion. (Note: Do not use decongestants if you have high blood pressure.)  Follow-up care  Follow up with your healthcare provider, or as advised.  When to seek medical advice  Call your healthcare provider right away if any of these occur:  · Cough with lots of colored sputum (mucus)  · Severe headache; face, neck, or ear pain  · Difficulty swallowing due to throat pain  · Fever of 100.4°F (38°C)  Call 911, or get immediate medical care  Call emergency services right away if any of these occur:  · Chest pain, shortness of breath, wheezing, or difficulty breathing  · Coughing up blood  · Inability to swallow due to throat pain  Date Last Reviewed: 9/13/2015  © 4005-4034 [employer], [employer]. [address] [place] [address], [place] [address]. All rights reserved. This information is not intended as a substitute for professional medical care. Always follow your healthcare professional's instructions.

## 2019-02-25 ENCOUNTER — TELEPHONE (OUTPATIENT)
Dept: OBSTETRICS AND GYNECOLOGY | Facility: CLINIC | Age: 54
End: 2019-02-25

## 2019-02-25 DIAGNOSIS — Z12.31 ENCOUNTER FOR SCREENING MAMMOGRAM FOR BREAST CANCER: Primary | ICD-10-CM

## 2019-02-25 NOTE — TELEPHONE ENCOUNTER
----- Message from Verenice Edge sent at 2/25/2019 12:16 PM CST -----  Contact: elizabeth  Name of Who is Calling: elizabeth      What is the request in detail: Patient is requesting orders for her annual mammogram       Can the clinic reply by [facility]: no      What [facility]: [phone]

## 2019-03-07 ENCOUNTER — HOSPITAL ENCOUNTER (OUTPATIENT)
Dept: RADIOLOGY | Facility: HOSPITAL | Age: 54
Discharge: HOME | End: 2019-03-07
Attending: OBSTETRICS & GYNECOLOGY
Payer: COMMERCIAL

## 2019-03-07 DIAGNOSIS — Z12.31 ENCOUNTER FOR SCREENING MAMMOGRAM FOR BREAST CANCER: ICD-10-CM

## 2019-03-07 PROCEDURE — 77067 MAMMO DIGITAL SCREENING BILAT WITH TOMOSYNTHESIS_CAD: ICD-10-PCS | Mod: 26,,, | Performed by: RADIOLOGY

## 2019-03-07 PROCEDURE — 77067 SCR MAMMO BI INCL CAD: CPT | Mod: TC,PO

## 2019-03-07 PROCEDURE — 77063 MAMMO DIGITAL SCREENING BILAT WITH TOMOSYNTHESIS_CAD: ICD-10-PCS | Mod: 26,,, | Performed by: RADIOLOGY

## 2019-03-07 PROCEDURE — 77067 SCR MAMMO BI INCL CAD: CPT | Mod: 26,,, | Performed by: RADIOLOGY

## 2019-03-07 PROCEDURE — 77063 BREAST TOMOSYNTHESIS BI: CPT | Mod: 26,,, | Performed by: RADIOLOGY

## 2019-03-08 ENCOUNTER — PATIENT MESSAGE (OUTPATIENT)
Dept: OBSTETRICS AND GYNECOLOGY | Facility: CLINIC | Age: 54
End: 2019-03-08

## 2020-02-18 ENCOUNTER — TELEPHONE (OUTPATIENT)
Dept: NEPHROLOGY | Facility: CLINIC | Age: 55
End: 2020-02-18

## 2020-02-18 DIAGNOSIS — N18.30 CKD (CHRONIC KIDNEY DISEASE) STAGE 3, GFR 30-59 ML/MIN: Primary | ICD-10-CM

## 2020-02-18 RX ORDER — ALBUTEROL SULFATE 90 UG/1
AEROSOL, METERED RESPIRATORY (INHALATION)
COMMUNITY
End: 2020-05-13

## 2020-02-18 RX ORDER — MELOXICAM 7.5 MG/1
TABLET ORAL
COMMUNITY
Start: 2020-01-21 | End: 2020-05-13

## 2020-02-18 NOTE — TELEPHONE ENCOUNTER
rp, pth, ua, upc, cbc --spoke with patient who will get labs at her convenience at [facility].  She will call if I can help.

## 2020-02-18 NOTE — TELEPHONE ENCOUNTER
----- Message from Lexi Beatty sent at 2/18/2020 10:11 AM CST -----  Contact: Patient  Type: Needs Medical Advice    Who Called:  Patient  Best Call Back Number:   Additional Information: Calling to request that orders be put in for labs before her appointment next month.

## 2020-04-30 ENCOUNTER — LAB VISIT (OUTPATIENT)
Dept: LAB | Facility: HOSPITAL | Age: 55
End: 2020-04-30
Attending: INTERNAL MEDICINE
Payer: COMMERCIAL

## 2020-04-30 DIAGNOSIS — N18.30 CKD (CHRONIC KIDNEY DISEASE) STAGE 3, GFR 30-59 ML/MIN: ICD-10-CM

## 2020-04-30 LAB
BACTERIA #/AREA URNS AUTO: ABNORMAL /HPF
BILIRUB UR QL STRIP: NEGATIVE
CLARITY UR REFRACT.AUTO: ABNORMAL
COLOR UR AUTO: YELLOW
CREAT UR-MCNC: 63 MG/DL (ref 15–325)
GLUCOSE UR QL STRIP: NEGATIVE
HGB UR QL STRIP: NEGATIVE
HYALINE CASTS UR QL AUTO: 0 /LPF
KETONES UR QL STRIP: NEGATIVE
LEUKOCYTE ESTERASE UR QL STRIP: ABNORMAL
MICROSCOPIC COMMENT: ABNORMAL
NITRITE UR QL STRIP: NEGATIVE
PH UR STRIP: 5 [PH] (ref 5–8)
PROT UR QL STRIP: ABNORMAL
PROT UR-MCNC: 83 MG/DL (ref 0–15)
PROT/CREAT UR: 1.32 MG/G{CREAT} (ref 0–0.2)
RBC #/AREA URNS AUTO: 2 /HPF (ref 0–4)
SP GR UR STRIP: 1.01 (ref 1–1.03)
SQUAMOUS #/AREA URNS AUTO: 4 /HPF
URN SPEC COLLECT METH UR: ABNORMAL
WBC #/AREA URNS AUTO: 26 /HPF (ref 0–5)

## 2020-04-30 PROCEDURE — 84156 ASSAY OF PROTEIN URINE: CPT

## 2020-04-30 PROCEDURE — 81001 URINALYSIS AUTO W/SCOPE: CPT

## 2020-05-04 ENCOUNTER — PATIENT MESSAGE (OUTPATIENT)
Dept: NEPHROLOGY | Facility: CLINIC | Age: 55
End: 2020-05-04

## 2020-05-05 ENCOUNTER — PATIENT MESSAGE (OUTPATIENT)
Dept: NEPHROLOGY | Facility: CLINIC | Age: 55
End: 2020-05-05

## 2020-05-05 RX ORDER — ENALAPRIL MALEATE 10 MG/1
10 TABLET ORAL DAILY
Qty: 90 TABLET | Refills: 1 | Status: SHIPPED | OUTPATIENT
Start: 2020-05-05 | End: 2020-06-03 | Stop reason: ALTCHOICE

## 2020-05-13 ENCOUNTER — OFFICE VISIT (OUTPATIENT)
Dept: NEPHROLOGY | Facility: CLINIC | Age: 55
End: 2020-05-13
Payer: COMMERCIAL

## 2020-05-13 VITALS
SYSTOLIC BLOOD PRESSURE: 105 MMHG | DIASTOLIC BLOOD PRESSURE: 58 MMHG | BODY MASS INDEX: 37.25 KG/M2 | WEIGHT: 217 LBS | HEART RATE: 77 BPM

## 2020-05-13 DIAGNOSIS — N18.30 CKD (CHRONIC KIDNEY DISEASE) STAGE 3, GFR 30-59 ML/MIN: Primary | ICD-10-CM

## 2020-05-13 PROCEDURE — 3008F BODY MASS INDEX DOCD: CPT | Mod: CPTII,,, | Performed by: INTERNAL MEDICINE

## 2020-05-13 PROCEDURE — 3008F PR BODY MASS INDEX (BMI) DOCUMENTED: ICD-10-PCS | Mod: CPTII,,, | Performed by: INTERNAL MEDICINE

## 2020-05-13 PROCEDURE — 99214 OFFICE O/P EST MOD 30 MIN: CPT | Mod: 95,,, | Performed by: INTERNAL MEDICINE

## 2020-05-13 PROCEDURE — 99214 PR OFFICE/OUTPT VISIT, EST, LEVL IV, 30-39 MIN: ICD-10-PCS | Mod: 95,,, | Performed by: INTERNAL MEDICINE

## 2020-05-13 NOTE — PROGRESS NOTES
Subjective:       Patient ID: Jacqui Leonard is a 54 y.o. White female who presents for return patient evaluation for chronic renal failure.    The patient location is:  Patient Home   The chief complaint leading to consultation is: ckd  Visit type: Virtual visit with synchronous audio and video  Total time spent with patient: 14 minutes  Each patient to whom he or she provides medical services by telemedicine is:  (1) informed of the relationship between the physician and patient and the respective role of any other health care provider with respect to management of the patient; and (2) notified that he or she may decline to receive medical services by telemedicine and may withdraw from such care at any time.     She has been lost to follow-up since 2018.  She has no uremic or urinary symptoms and is in her usual state of health.  There have been no recent illnesses, hospitalizations or procedures.        Review of Systems   Constitutional: Positive for fatigue. Negative for appetite change, chills and fever.   Eyes: Negative for visual disturbance.   Respiratory: Positive for shortness of breath (with exertion). Negative for cough.    Cardiovascular: Negative for chest pain and leg swelling.   Gastrointestinal: Negative for nausea.   Genitourinary: Negative for difficulty urinating, dysuria and hematuria.   Musculoskeletal: Positive for arthralgias (L shoulder) and gait problem.   Skin: Negative for rash.   Neurological: Negative for headaches.   Psychiatric/Behavioral: Positive for sleep disturbance.       The past medical, family and social histories were reviewed for this encounter.     BP (!) 105/58   Pulse 77   Wt 98.4 kg (217 lb)   BMI 37.25 kg/m²     Objective:      Physical Exam   Constitutional: She is oriented to person, place, and time. She appears well-developed and well-nourished. No distress.   HENT:   Head: Normocephalic and atraumatic.   Pulmonary/Chest: Effort normal.   Musculoskeletal:  She exhibits no edema.   Neurological: She is alert and oriented to person, place, and time.   Psychiatric: She has a normal mood and affect. Her behavior is normal.   Vitals reviewed.     Assessment:       1. CKD (chronic kidney disease) stage 3, GFR 30-59 ml/min        Plan:   Return to clinic in 1 month.  Labs for this week includes rp, renal US at the [place] location.  Baseline creatinine is 1.2-1.5.  PTH is 250 with a calcium of 9.4.  Start calcitriol 0.25 mcg daily.  She has been lost to follow-up thus I do not know if her creatinine of 1.8 is isolated or is part of a trend of worsening renal function.    Please avoid or minimize all NSAIDS (ibuprofen, motrin, aleve, indocin, naprosyn) to minimize the risk to your kidneys.  Aspirin in a dose of 325 mg or less a day is likely the safest with regards to kidney function.  If you are able to take aspirin and do not have any bleeding problems or ulcers, this may be your best therapy.  Alternatively, acetaminophen (Tylenol) is the safer than NSAIDs with regards to kidney function.  I would ask you take this as directed on the bottle.  It is best to stay under 2 grams a day (4-500 mg tablets a day maximum).  I have no reason to explain her change in her baseline.  She has been lost to follow-up.  I would consider renal biopsy to see what her pathology is in the case.

## 2020-05-15 ENCOUNTER — HOSPITAL ENCOUNTER (OUTPATIENT)
Dept: RADIOLOGY | Facility: HOSPITAL | Age: 55
Discharge: HOME | End: 2020-05-15
Attending: INTERNAL MEDICINE
Payer: COMMERCIAL

## 2020-05-15 ENCOUNTER — LAB VISIT (OUTPATIENT)
Dept: LAB | Facility: HOSPITAL | Age: 55
End: 2020-05-15
Attending: INTERNAL MEDICINE
Payer: COMMERCIAL

## 2020-05-15 DIAGNOSIS — N18.30 CKD (CHRONIC KIDNEY DISEASE) STAGE 3, GFR 30-59 ML/MIN: ICD-10-CM

## 2020-05-15 LAB
ALBUMIN SERPL BCP-MCNC: 3.6 G/DL (ref 3.5–5.2)
ANION GAP SERPL CALC-SCNC: 7 MMOL/L (ref 8–16)
BUN SERPL-MCNC: 49 MG/DL (ref 6–20)
CALCIUM SERPL-MCNC: 9.1 MG/DL (ref 8.7–10.5)
CHLORIDE SERPL-SCNC: 108 MMOL/L (ref 95–110)
CO2 SERPL-SCNC: 24 MMOL/L (ref 23–29)
CREAT SERPL-MCNC: 2.2 MG/DL (ref 0.5–1.4)
EST. GFR  (AFRICAN AMERICAN): 28.3 ML/MIN/1.73 M^2
EST. GFR  (NON AFRICAN AMERICAN): 24.5 ML/MIN/1.73 M^2
GLUCOSE SERPL-MCNC: 84 MG/DL (ref 70–110)
PHOSPHATE SERPL-MCNC: 4.1 MG/DL (ref 2.7–4.5)
POTASSIUM SERPL-SCNC: 5.4 MMOL/L (ref 3.5–5.1)
SODIUM SERPL-SCNC: 139 MMOL/L (ref 136–145)

## 2020-05-15 PROCEDURE — 76770 US EXAM ABDO BACK WALL COMP: CPT | Mod: TC

## 2020-05-15 PROCEDURE — 80069 RENAL FUNCTION PANEL: CPT

## 2020-05-15 PROCEDURE — 36415 COLL VENOUS BLD VENIPUNCTURE: CPT

## 2020-05-15 PROCEDURE — 76770 US RETROPERITONEAL COMPLETE: ICD-10-PCS | Mod: 26,,, | Performed by: INTERNAL MEDICINE

## 2020-05-15 PROCEDURE — 76770 US EXAM ABDO BACK WALL COMP: CPT | Mod: 26,,, | Performed by: INTERNAL MEDICINE

## 2020-05-27 ENCOUNTER — PATIENT MESSAGE (OUTPATIENT)
Dept: NEPHROLOGY | Facility: CLINIC | Age: 55
End: 2020-05-27

## 2020-05-27 NOTE — TELEPHONE ENCOUNTER
She needs Vit D you wanted her to have sent out to [facility] in [place].  Also, do I need to RS her from the 0840 slot on [address]?

## 2020-05-28 RX ORDER — CALCITRIOL 0.25 UG/1
0.25 CAPSULE ORAL DAILY
Qty: 90 CAPSULE | Refills: 1 | Status: SHIPPED | OUTPATIENT
Start: 2020-05-28 | End: 2020-10-09 | Stop reason: SDUPTHER

## 2020-06-03 ENCOUNTER — OFFICE VISIT (OUTPATIENT)
Dept: NEPHROLOGY | Facility: CLINIC | Age: 55
End: 2020-06-03
Payer: COMMERCIAL

## 2020-06-03 VITALS — SYSTOLIC BLOOD PRESSURE: 99 MMHG | DIASTOLIC BLOOD PRESSURE: 59 MMHG

## 2020-06-03 DIAGNOSIS — N18.30 CKD (CHRONIC KIDNEY DISEASE) STAGE 3, GFR 30-59 ML/MIN: Primary | ICD-10-CM

## 2020-06-03 PROCEDURE — 99214 OFFICE O/P EST MOD 30 MIN: CPT | Mod: 95,,, | Performed by: INTERNAL MEDICINE

## 2020-06-03 PROCEDURE — 99214 PR OFFICE/OUTPT VISIT, EST, LEVL IV, 30-39 MIN: ICD-10-PCS | Mod: 95,,, | Performed by: INTERNAL MEDICINE

## 2020-06-03 NOTE — PROGRESS NOTES
Subjective:       Patient ID: Jacqui Leonard is a 54 y.o. White female who presents for return patient evaluation for chronic renal failure.    The patient location is:  Patient Home   The chief complaint leading to consultation is: ckd  Visit type: Virtual visit with synchronous audio and video  Total time spent with patient: 20 minutes  Each patient to whom he or she provides medical services by telemedicine is:  (1) informed of the relationship between the physician and patient and the respective role of any other health care provider with respect to management of the patient; and (2) notified that he or she may decline to receive medical services by telemedicine and may withdraw from such care at any time.     She has no uremic or urinary symptoms and is in her usual state of health.  There have been no recent illnesses, hospitalizations or procedures.  She is being followed for a thyroid mass.  She had a renal biopsy in 2006.  She has been eating oranges abundantly.      Review of Systems   Constitutional: Positive for fatigue. Negative for appetite change, chills and fever.   HENT: Negative for congestion.    Eyes: Negative for visual disturbance.   Respiratory: Positive for shortness of breath (with exertion). Negative for cough.    Cardiovascular: Negative for chest pain and leg swelling.   Gastrointestinal: Negative for nausea.   Genitourinary: Negative for difficulty urinating, dysuria and hematuria.   Musculoskeletal: Positive for arthralgias (L shoulder) and gait problem.   Skin: Negative for rash.   Neurological: Negative for headaches.   Psychiatric/Behavioral: Positive for sleep disturbance.       The past medical, family and social histories were reviewed for this encounter.     BP (!) 99/59     Objective:      Physical Exam   Constitutional: She is oriented to person, place, and time. She appears well-developed and well-nourished. No distress.   HENT:   Head: Normocephalic and atraumatic.    Pulmonary/Chest: Effort normal.   Musculoskeletal: She exhibits no edema.   Neurological: She is alert and oriented to person, place, and time.   Psychiatric: She has a normal mood and affect. Her behavior is normal.   Vitals reviewed.     Assessment:       1. CKD (chronic kidney disease) stage 3, GFR 30-59 ml/min        Plan:   Return to clinic in 1 month.  Labs for this week includes rp, ua at the [place] location.  Baseline creatinine is 1.2-1.5 prior to 2016.  Lately she has been 2.2-2.3.  PTH is 250 with a calcium of 9.4.  Continue calcitriol 0.25 mcg daily.  She has been lost to follow-up thus I do not know if her creatinine of 2.2-2.3 is isolated or is part of a trend of worsening renal function.    Please avoid or minimize all NSAIDS (ibuprofen, motrin, aleve, indocin, naprosyn) to minimize the risk to your kidneys.  Aspirin in a dose of 325 mg or less a day is likely the safest with regards to kidney function.  If you are able to take aspirin and do not have any bleeding problems or ulcers, this may be your best therapy.  Alternatively, acetaminophen (Tylenol) is the safer than NSAIDs with regards to kidney function.  I would ask you take this as directed on the bottle.  It is best to stay under 2 grams a day (4-500 mg tablets a day maximum).  I have no reason to explain her change in her baseline.  She has been lost to follow-up.  I will consider renal re-biopsy to see what her pathology is in this case.  She has been seen by [facility].  We discussed making lifestyle changes and using a Mediterranean diet for health benefits and weight loss.  We discussed her potassium intake.  Refer to Nutrition.  Refer to Internal Medicine.

## 2020-06-06 ENCOUNTER — LAB VISIT (OUTPATIENT)
Dept: LAB | Facility: HOSPITAL | Age: 55
End: 2020-06-06
Attending: INTERNAL MEDICINE
Payer: COMMERCIAL

## 2020-06-06 DIAGNOSIS — N18.30 CKD (CHRONIC KIDNEY DISEASE) STAGE 3, GFR 30-59 ML/MIN: ICD-10-CM

## 2020-06-06 LAB
BACTERIA #/AREA URNS AUTO: ABNORMAL /HPF
BILIRUB UR QL STRIP: NEGATIVE
CLARITY UR REFRACT.AUTO: ABNORMAL
COLOR UR AUTO: YELLOW
GLUCOSE UR QL STRIP: NEGATIVE
HGB UR QL STRIP: NEGATIVE
HYALINE CASTS UR QL AUTO: 0 /LPF
KETONES UR QL STRIP: NEGATIVE
LEUKOCYTE ESTERASE UR QL STRIP: ABNORMAL
MICROSCOPIC COMMENT: ABNORMAL
NITRITE UR QL STRIP: NEGATIVE
PH UR STRIP: 6 [PH] (ref 5–8)
PROT UR QL STRIP: ABNORMAL
RBC #/AREA URNS AUTO: 1 /HPF (ref 0–4)
SP GR UR STRIP: 1.01 (ref 1–1.03)
SQUAMOUS #/AREA URNS AUTO: 3 /HPF
URN SPEC COLLECT METH UR: ABNORMAL
WBC #/AREA URNS AUTO: 28 /HPF (ref 0–5)

## 2020-06-06 PROCEDURE — 81001 URINALYSIS AUTO W/SCOPE: CPT

## 2020-06-22 ENCOUNTER — PATIENT MESSAGE (OUTPATIENT)
Dept: NEPHROLOGY | Facility: CLINIC | Age: 55
End: 2020-06-22

## 2020-06-23 ENCOUNTER — OFFICE VISIT (OUTPATIENT)
Dept: NEPHROLOGY | Facility: CLINIC | Age: 55
End: 2020-06-23
Payer: COMMERCIAL

## 2020-06-23 VITALS — DIASTOLIC BLOOD PRESSURE: 70 MMHG | SYSTOLIC BLOOD PRESSURE: 110 MMHG

## 2020-06-23 DIAGNOSIS — N18.30 CKD (CHRONIC KIDNEY DISEASE) STAGE 3, GFR 30-59 ML/MIN: Primary | ICD-10-CM

## 2020-06-23 PROCEDURE — 99214 OFFICE O/P EST MOD 30 MIN: CPT | Mod: 95,,, | Performed by: INTERNAL MEDICINE

## 2020-06-23 PROCEDURE — 99214 PR OFFICE/OUTPT VISIT, EST, LEVL IV, 30-39 MIN: ICD-10-PCS | Mod: 95,,, | Performed by: INTERNAL MEDICINE

## 2020-06-23 RX ORDER — OMEPRAZOLE 20 MG/1
20 CAPSULE, DELAYED RELEASE ORAL DAILY
COMMUNITY
End: 2020-10-09 | Stop reason: SDUPTHER

## 2020-06-23 NOTE — Clinical Note
I sent a nutrition referral last visit.  Can you plese follow-up on this to see that it gets scheduled?  Thanks!

## 2020-06-23 NOTE — PROGRESS NOTES
Subjective:       Patient ID: Jacqui Leonard is a 54 y.o. White female who presents for return patient evaluation for chronic renal failure.    The patient location is:  Patient Home   The chief complaint leading to consultation is: ckd  Visit type: Virtual visit with synchronous audio and video  Total time spent with patient: 15 minutes  Each patient to whom he or she provides medical services by telemedicine is:  (1) informed of the relationship between the physician and patient and the respective role of any other health care provider with respect to management of the patient; and (2) notified that he or she may decline to receive medical services by telemedicine and may withdraw from such care at any time.     She has no uremic or urinary symptoms and is in her usual state of health.  There have been no recent illnesses, hospitalizations or procedures.  She is being followed for a thyroid mass.  She had a renal biopsy in 2006.  She is looking to have bariatric surgery if safe to do so.      Review of Systems   Constitutional: Positive for fatigue. Negative for appetite change, chills and fever.   HENT: Negative for congestion.    Eyes: Negative for visual disturbance.   Respiratory: Positive for shortness of breath (with exertion-improving). Negative for cough.    Cardiovascular: Negative for chest pain and leg swelling.   Gastrointestinal: Negative for nausea.   Genitourinary: Negative for difficulty urinating, dysuria and hematuria.   Musculoskeletal: Positive for arthralgias (L shoulder) and gait problem.   Skin: Negative for rash.   Neurological: Negative for headaches.   Psychiatric/Behavioral: Positive for sleep disturbance.       The past medical, family and social histories were reviewed for this encounter.     /70     Objective:      Physical Exam  Vitals signs reviewed.   Constitutional:       General: She is not in acute distress.     Appearance: She is well-developed.   HENT:      Head:  Normocephalic and atraumatic.   Eyes:      General: No scleral icterus.  Pulmonary:      Effort: Pulmonary effort is normal.   Neurological:      Mental Status: She is alert and oriented to person, place, and time.   Psychiatric:         Mood and Affect: Mood normal.         Behavior: Behavior normal.        Assessment:       1. CKD (chronic kidney disease) stage 3, GFR 30-59 ml/min        Plan:   Return to clinic in 1 month-6 weeks with VV.  Labs for this week includes rp, ua, upc at the [place] location.  Baseline creatinine is 1.2-1.5 prior to 2016.  Lately she has been 2.2-2.3.  PTH is 250 with a calcium of 9.4.  Continue calcitriol 0.25 mcg daily.  She has been lost to follow-up thus I do not know if her creatinine of 2.2-2.3 is isolated or is part of a trend of worsening renal function.    Please avoid or minimize all NSAIDS (ibuprofen, motrin, aleve, indocin, naprosyn) to minimize the risk to your kidneys.  Aspirin in a dose of 325 mg or less a day is likely the safest with regards to kidney function.  If you are able to take aspirin and do not have any bleeding problems or ulcers, this may be your best therapy.  Alternatively, acetaminophen (Tylenol) is the safer than NSAIDs with regards to kidney function.  I would ask you take this as directed on the bottle.  It is best to stay under 2 grams a day (4-500 mg tablets a day maximum).  I have no reason to explain her change in her baseline.  She has been lost to follow-up.  I will consider renal re-biopsy to see what her pathology is in this case.  She has been seen by [facility].  We discussed making lifestyle changes and using a Mediterranean diet for health benefits and weight loss.  She seems to be settling down now.  I would be in favor of a renal biopsy if she does not continue to keep settling down.  I would be in favor of proceeding with Bariatric surgery if her kidney function continues to settle down.

## 2020-07-06 ENCOUNTER — NUTRITION (OUTPATIENT)
Dept: NUTRITION | Facility: CLINIC | Age: 55
End: 2020-07-06
Payer: COMMERCIAL

## 2020-07-06 VITALS — BODY MASS INDEX: 37.11 KG/M2 | WEIGHT: 217.38 LBS | HEIGHT: 64 IN

## 2020-07-06 DIAGNOSIS — E66.9 OBESITY (BMI 30-39.9): ICD-10-CM

## 2020-07-06 DIAGNOSIS — Z71.3 DIETARY COUNSELING: ICD-10-CM

## 2020-07-06 DIAGNOSIS — N18.30 CKD (CHRONIC KIDNEY DISEASE) STAGE 3, GFR 30-59 ML/MIN: Primary | ICD-10-CM

## 2020-07-06 DIAGNOSIS — I10 ESSENTIAL HYPERTENSION: ICD-10-CM

## 2020-07-06 PROCEDURE — 99999 PR PBB SHADOW E&M-EST. PATIENT-LVL III: ICD-10-PCS | Mod: PBBFAC,,,

## 2020-07-06 PROCEDURE — 97802 PR MED NUTR THER, 1ST, INDIV, EA 15 MIN: ICD-10-PCS | Mod: S$GLB,,, | Performed by: DIETITIAN, REGISTERED

## 2020-07-06 PROCEDURE — 97802 MEDICAL NUTRITION INDIV IN: CPT | Mod: S$GLB,,, | Performed by: DIETITIAN, REGISTERED

## 2020-07-06 PROCEDURE — 99999 PR PBB SHADOW E&M-EST. PATIENT-LVL III: CPT | Mod: PBBFAC,,,

## 2020-07-06 NOTE — PROGRESS NOTES
"Referring Physician:Keshav Dozier MD     Reason for visit:  Chief Complaint   Patient presents with    Chronic Kidney Disease    Obesity    Nutrition Counseling    Hypertension      Initial Visit    :1965     Allergies Reviewed  Meds Reviewed    Anthropometrics  Weight:98.6 kg (217 lb 6 oz)-standing scale today in clinic  Height:5' 4" (1.626 m)  BMI:Body mass index is 37.31 kg/m².   IBW:   54.5 kg  +/-10%    Meds:  Outpatient Medications Prior to Visit   Medication Sig Dispense Refill    calcitRIOL (ROCALTROL) 0.25 MCG Cap Take 1 capsule (0.25 mcg total) by mouth once daily. 90 capsule 1    omeprazole (PRILOSEC) 20 MG capsule Take 20 mg by mouth once daily.      pravastatin (PRAVACHOL) 10 MG tablet Take 10 mg by mouth once daily.  3     No facility-administered medications prior to visit.        Food/Drug Interactions Noted:  n/a    Vitamins/Supplements/Herbs:  none    Labs: K+  5.4, down to 4.4   Cr  1.9   eGFR  29.3     Nutrition Prescription:   1635 Kcals/day( 30 kcal/kg IBW),    44 g protein( 0.8 g/kg IBW)     Support System:    Pt lives alone and does her own grocery shopping and little cooking.  Used [employer] during COVID quarantine    Diet Hx:   Pt states she learns best by demonstration/explanation, and is confused about diet regimen with CKD.  Has done some research on her own using the internet.  Uses Mrs Dash and essential oils ie basil or other plant-based oils to season her food.  Was used to eating out prior to COVID.  States she drank "a lot" of diet Dr Pepper; now drinks water with lemon or essential oil flavoring.  Uses olive oil and butter.   When she snacks, which she states is rarely, she prefers salty to sweet ie popcorn with sea salt or fresh fruits (states she was eating a lot of oranges and drinking OJ during stay-home, and eating large quantities of walnuts because she wanted healthy snacks-was advised of high potassium content of these items)  Pt states she is " interested in bariatric surgery, and would go to [place] to have surgery because it's less expensive.  Would like to try to lose weight on her own in the meantime.  Doesn't read food labels or track calorie intake.       Breakfast: nothing except coffee with Splenda and powdered creamer  Lunch: 2 baked boneless, skinless chicken thighs with fresh or frozen vegetables.  Water with lemon  Dinner:   Last night:  Baked chicken with veg.    Current activity level and/or physical limitations:    No exercise at this time; no apparent physical limitations    Motivation to make changes/anticipated barriers and/or expected adherence:  Pt seems motivated to lose weight and follow guidelines for low sodium, low potassium diet to protect kidney function.    Nutrition-Focus Physical Findings:  Pt wearing face covering per COVID19 protocol; pt appears well nourished    Assessment: pt asked about keto diet as potential weight loss diet regimen (has friends who have lost weight)-advised against this 2/2 CKD. Pt asked relevant questions about foods/beverages recommended & to avoid; sample meal plan and menus to promote weight loss; reading food labels; grocery shopping and cooking tips; seasoning without using salt; portion control; what is the Mediterranean Diet and how does it relate to CKD; healthy snacks.  All questions answered, and she verbalized understanding of information.     Nutrition Diagnosis:   Food- and nutrition-related knowledge deficit RT lack of prior exposure to information AEB dx CKD/Obesity      Recommendations: 1200 calorie, low fat, low sodium, high fiber diet.  Renal diet restrictions per MD order  Exercise goal:  30 minutes per day, 3-5 days per week.  Handouts provided & reviewed:  Low-Sodium Nutrition Therapy; Acceptable Salt-Free Seasoning Blends; Sodium-free Flavoring Tips; Reading A Food Label; Mediterranean Diet and shopping list; List of Foods High in Potassium; List of Foods Lower in Potassium; 1200  Calorie Sample 5-day Menus; Servings of Carbohydrates for Meal Planning    Strategies Implemented:    Portion control; increase physical activity    Consultation Time:60 minutes.  Communicated with referring healthcare provider:  Consult note available in pt's Epic chart per MD discretion  Follow Up:  Pt provided with dietitian contact number and advised to call with questions or make future appointment if further intervention needed.

## 2020-07-06 NOTE — PATIENT INSTRUCTIONS
1200 calorie, low fat, low sodium, high fiber diet.  Renal diet restrictions per MD order  Exercise goal:  30 minutes per day, 3-5 days per week.

## 2020-07-25 ENCOUNTER — LAB VISIT (OUTPATIENT)
Dept: LAB | Facility: HOSPITAL | Age: 55
End: 2020-07-25
Attending: INTERNAL MEDICINE
Payer: COMMERCIAL

## 2020-07-25 DIAGNOSIS — N18.30 CKD (CHRONIC KIDNEY DISEASE) STAGE 3, GFR 30-59 ML/MIN: ICD-10-CM

## 2020-07-25 LAB
BACTERIA #/AREA URNS AUTO: ABNORMAL /HPF
BILIRUB UR QL STRIP: NEGATIVE
CLARITY UR REFRACT.AUTO: ABNORMAL
COLOR UR AUTO: YELLOW
CREAT UR-MCNC: 63 MG/DL (ref 15–325)
GLUCOSE UR QL STRIP: NEGATIVE
HGB UR QL STRIP: ABNORMAL
HYALINE CASTS UR QL AUTO: 0 /LPF
KETONES UR QL STRIP: NEGATIVE
LEUKOCYTE ESTERASE UR QL STRIP: ABNORMAL
MICROSCOPIC COMMENT: ABNORMAL
NITRITE UR QL STRIP: NEGATIVE
PH UR STRIP: 5 [PH] (ref 5–8)
PROT UR QL STRIP: ABNORMAL
PROT UR-MCNC: 153 MG/DL (ref 0–15)
PROT/CREAT UR: 2.43 MG/G{CREAT} (ref 0–0.2)
RBC #/AREA URNS AUTO: 1 /HPF (ref 0–4)
SP GR UR STRIP: 1.01 (ref 1–1.03)
SQUAMOUS #/AREA URNS AUTO: 9 /HPF
URN SPEC COLLECT METH UR: ABNORMAL
WBC #/AREA URNS AUTO: 63 /HPF (ref 0–5)

## 2020-07-25 PROCEDURE — 81001 URINALYSIS AUTO W/SCOPE: CPT

## 2020-07-25 PROCEDURE — 84156 ASSAY OF PROTEIN URINE: CPT

## 2020-08-07 ENCOUNTER — OFFICE VISIT (OUTPATIENT)
Dept: NEPHROLOGY | Facility: CLINIC | Age: 55
End: 2020-08-07
Payer: COMMERCIAL

## 2020-08-07 VITALS — SYSTOLIC BLOOD PRESSURE: 130 MMHG | DIASTOLIC BLOOD PRESSURE: 70 MMHG | BODY MASS INDEX: 35.7 KG/M2 | WEIGHT: 208 LBS

## 2020-08-07 DIAGNOSIS — N18.30 CKD (CHRONIC KIDNEY DISEASE) STAGE 3, GFR 30-59 ML/MIN: Primary | ICD-10-CM

## 2020-08-07 PROCEDURE — 99214 PR OFFICE/OUTPT VISIT, EST, LEVL IV, 30-39 MIN: ICD-10-PCS | Mod: 95,,, | Performed by: INTERNAL MEDICINE

## 2020-08-07 PROCEDURE — 3008F PR BODY MASS INDEX (BMI) DOCUMENTED: ICD-10-PCS | Mod: CPTII,,, | Performed by: INTERNAL MEDICINE

## 2020-08-07 PROCEDURE — 3008F BODY MASS INDEX DOCD: CPT | Mod: CPTII,,, | Performed by: INTERNAL MEDICINE

## 2020-08-07 PROCEDURE — 99214 OFFICE O/P EST MOD 30 MIN: CPT | Mod: 95,,, | Performed by: INTERNAL MEDICINE

## 2020-08-07 NOTE — PROGRESS NOTES
Subjective:       Patient ID: aJcqui Leonard is a 54 y.o. White female who presents for return patient evaluation for chronic renal failure.    The patient location is:  Patient Home   The chief complaint leading to consultation is: ckd  Visit type: Virtual visit with synchronous audio and video  Total time spent with patient: 10 minutes  Each patient to whom he or she provides medical services by telemedicine is:  (1) informed of the relationship between the physician and patient and the respective role of any other health care provider with respect to management of the patient; and (2) notified that he or she may decline to receive medical services by telemedicine and may withdraw from such care at any time.     She has no uremic or urinary symptoms and is in her usual state of health.  There have been no recent illnesses, hospitalizations or procedures.  She is being followed for a thyroid mass.  She had a renal biopsy in 2006.  She is looking to have bariatric surgery if safe to do so but is having second thoughts.      Review of Systems   Constitutional: Positive for fatigue. Negative for appetite change, chills and fever.   HENT: Negative for congestion.    Eyes: Negative for visual disturbance.   Respiratory: Positive for shortness of breath (with exertion-improving). Negative for cough.    Cardiovascular: Negative for chest pain and leg swelling.   Gastrointestinal: Negative for nausea.   Genitourinary: Negative for difficulty urinating, dysuria and hematuria.   Musculoskeletal: Positive for arthralgias (L shoulder) and gait problem.   Skin: Negative for rash.   Neurological: Negative for headaches.   Psychiatric/Behavioral: Positive for sleep disturbance.       The past medical, family and social histories were reviewed for this encounter.     /70   Wt 94.3 kg (208 lb)   BMI 35.70 kg/m²     Objective:      Physical Exam  Vitals signs reviewed.   Constitutional:       General: She is not in  acute distress.     Appearance: She is well-developed.   HENT:      Head: Normocephalic and atraumatic.   Eyes:      General: No scleral icterus.  Pulmonary:      Effort: Pulmonary effort is normal.   Neurological:      Mental Status: She is alert and oriented to person, place, and time.   Psychiatric:         Mood and Affect: Mood normal.         Behavior: Behavior normal.        Assessment:       1. CKD (chronic kidney disease) stage 3, GFR 30-59 ml/min        Plan:   Return to clinic in 8-10 weeks with VV.  Labs for this week includes rp at the [place] location on weekday or WB on weekend ([facility]).  Baseline creatinine is 1.2-1.5 prior to 2016.  PTH is 250 with a calcium of 9.4.  Continue calcitriol 0.25 mcg daily.  She has been lost to follow-up thus I do not know if her creatinine of 2.2-2.3 is isolated or is part of a trend of worsening renal function.    Please avoid or minimize all NSAIDS (ibuprofen, motrin, aleve, indocin, naprosyn) to minimize the risk to your kidneys.  Aspirin in a dose of 325 mg or less a day is likely the safest with regards to kidney function.  If you are able to take aspirin and do not have any bleeding problems or ulcers, this may be your best therapy.  Alternatively, acetaminophen (Tylenol) is the safer than NSAIDs with regards to kidney function.  I would ask you take this as directed on the bottle.  It is best to stay under 2 grams a day (4-500 mg tablets a day maximum).  I have no reason to explain her change in her baseline.  She has been lost to follow-up.  I will consider renal re-biopsy to see what her pathology is in this case.  She has been seen by [facility].  We discussed making lifestyle changes and using a Mediterranean diet for health benefits and weight loss.  She seems to be settling down now.  I would be in favor of a renal biopsy if she does not continue to keep settling down.

## 2020-10-02 ENCOUNTER — LAB VISIT (OUTPATIENT)
Dept: LAB | Facility: HOSPITAL | Age: 55
End: 2020-10-02
Attending: INTERNAL MEDICINE
Payer: COMMERCIAL

## 2020-10-02 DIAGNOSIS — N18.30 CKD (CHRONIC KIDNEY DISEASE) STAGE 3, GFR 30-59 ML/MIN: ICD-10-CM

## 2020-10-02 LAB
ALBUMIN SERPL BCP-MCNC: 3.7 G/DL (ref 3.5–5.2)
ANION GAP SERPL CALC-SCNC: 10 MMOL/L (ref 8–16)
BUN SERPL-MCNC: 41 MG/DL (ref 6–20)
CALCIUM SERPL-MCNC: 9.6 MG/DL (ref 8.7–10.5)
CHLORIDE SERPL-SCNC: 105 MMOL/L (ref 95–110)
CO2 SERPL-SCNC: 26 MMOL/L (ref 23–29)
CREAT SERPL-MCNC: 1.9 MG/DL (ref 0.5–1.4)
EST. GFR  (AFRICAN AMERICAN): 33.7 ML/MIN/1.73 M^2
EST. GFR  (NON AFRICAN AMERICAN): 29.3 ML/MIN/1.73 M^2
GLUCOSE SERPL-MCNC: 84 MG/DL (ref 70–110)
PHOSPHATE SERPL-MCNC: 3.7 MG/DL (ref 2.7–4.5)
POTASSIUM SERPL-SCNC: 4.5 MMOL/L (ref 3.5–5.1)
SODIUM SERPL-SCNC: 141 MMOL/L (ref 136–145)

## 2020-10-02 PROCEDURE — 36415 COLL VENOUS BLD VENIPUNCTURE: CPT | Mod: PO

## 2020-10-02 PROCEDURE — 80069 RENAL FUNCTION PANEL: CPT

## 2020-10-09 ENCOUNTER — OFFICE VISIT (OUTPATIENT)
Dept: NEPHROLOGY | Facility: CLINIC | Age: 55
End: 2020-10-09
Payer: COMMERCIAL

## 2020-10-09 VITALS — DIASTOLIC BLOOD PRESSURE: 70 MMHG | SYSTOLIC BLOOD PRESSURE: 120 MMHG

## 2020-10-09 DIAGNOSIS — N18.30 STAGE 3 CHRONIC KIDNEY DISEASE, UNSPECIFIED WHETHER STAGE 3A OR 3B CKD: Primary | ICD-10-CM

## 2020-10-09 DIAGNOSIS — R80.1 PERSISTENT PROTEINURIA: ICD-10-CM

## 2020-10-09 PROCEDURE — 99214 PR OFFICE/OUTPT VISIT, EST, LEVL IV, 30-39 MIN: ICD-10-PCS | Mod: 95,,, | Performed by: INTERNAL MEDICINE

## 2020-10-09 PROCEDURE — 99214 OFFICE O/P EST MOD 30 MIN: CPT | Mod: 95,,, | Performed by: INTERNAL MEDICINE

## 2020-10-09 RX ORDER — PRAVASTATIN SODIUM 10 MG/1
10 TABLET ORAL DAILY
Qty: 90 TABLET | Refills: 1 | Status: SHIPPED | OUTPATIENT
Start: 2020-10-09 | End: 2021-08-16

## 2020-10-09 RX ORDER — CALCITRIOL 0.25 UG/1
0.25 CAPSULE ORAL DAILY
Qty: 90 CAPSULE | Refills: 1 | Status: SHIPPED | OUTPATIENT
Start: 2020-10-09 | End: 2021-03-01 | Stop reason: SDUPTHER

## 2020-10-09 RX ORDER — OMEPRAZOLE 20 MG/1
20 CAPSULE, DELAYED RELEASE ORAL DAILY
Qty: 90 CAPSULE | Refills: 1 | Status: SHIPPED | OUTPATIENT
Start: 2020-10-09 | End: 2023-11-09

## 2020-10-09 NOTE — PROGRESS NOTES
Subjective:       Patient ID: Jacqui Leonard is a 54 y.o. White female who presents for return patient evaluation for chronic renal failure.    The patient location is:  Patient Home   The chief complaint leading to consultation is: ckd  Visit type: Virtual visit with synchronous audio and video  Total time spent with patient: 12 minutes  Each patient to whom he or she provides medical services by telemedicine is:  (1) informed of the relationship between the physician and patient and the respective role of any other health care provider with respect to management of the patient; and (2) notified that he or she may decline to receive medical services by telemedicine and may withdraw from such care at any time.     She has no uremic or urinary symptoms and is in her usual state of health.  There have been no recent illnesses, hospitalizations or procedures.  She is being followed for a thyroid mass.  She had a renal biopsy in 2006.  She is looking to have bariatric surgery if safe to do so but is having second thoughts.      Review of Systems   Constitutional: Positive for fatigue. Negative for appetite change, chills and fever.   HENT: Negative for congestion.    Eyes: Negative for visual disturbance.   Respiratory: Positive for shortness of breath (with exertion-improving). Negative for cough.    Cardiovascular: Negative for chest pain and leg swelling.   Gastrointestinal: Negative for nausea.   Genitourinary: Negative for difficulty urinating, dysuria and hematuria.   Musculoskeletal: Positive for arthralgias (L shoulder) and gait problem.   Skin: Negative for rash.   Neurological: Negative for headaches.   Psychiatric/Behavioral: Positive for sleep disturbance.       The past medical, family and social histories were reviewed for this encounter.     /70     Objective:      Physical Exam  Vitals signs reviewed.   Constitutional:       General: She is not in acute distress.     Appearance: She is  well-developed.   HENT:      Head: Normocephalic and atraumatic.   Eyes:      General: No scleral icterus.  Pulmonary:      Effort: Pulmonary effort is normal.   Neurological:      Mental Status: She is alert and oriented to person, place, and time.   Psychiatric:         Mood and Affect: Mood normal.         Behavior: Behavior normal.        Assessment:       1. Stage 3 chronic kidney disease, unspecified whether stage 3a or 3b CKD        Plan:   Return to clinic in 4 months.  Labs for this week includes rp, pth, upc at the [place] location on weekday or WB on weekend ([facility]).  Baseline creatinine is 1.2-1.5 prior to 2016.  She has settled down in the 1.8-1.9 range now.  PTH is 250 with a calcium of 9.4.  Continue calcitriol 0.25 mcg daily.   UPC is 2.4.  She has been lost to follow-up thus I do not know if her creatinine of 2.2-2.3 is isolated or is part of a trend of worsening renal function.    Please avoid or minimize all NSAIDS (ibuprofen, motrin, aleve, indocin, naprosyn) to minimize the risk to your kidneys.  Aspirin in a dose of 325 mg or less a day is likely the safest with regards to kidney function.  If you are able to take aspirin and do not have any bleeding problems or ulcers, this may be your best therapy.  Alternatively, acetaminophen (Tylenol) is the safer than NSAIDs with regards to kidney function.  I would ask you take this as directed on the bottle.  It is best to stay under 2 grams a day (4-500 mg tablets a day maximum).  I have no reason to explain her change in her baseline.  She has been lost to follow-up.  I will consider renal re-biopsy to see what her pathology is in this case.  She has been seen by [facility].  We discussed making lifestyle changes and using a Mediterranean diet for health benefits and weight loss.  She seems to be settling down now.  I would be in favor of a renal biopsy if she does not continue to keep settling down.

## 2021-02-09 ENCOUNTER — OFFICE VISIT (OUTPATIENT)
Dept: OBSTETRICS AND GYNECOLOGY | Facility: CLINIC | Age: 56
End: 2021-02-09
Attending: OBSTETRICS & GYNECOLOGY
Payer: COMMERCIAL

## 2021-02-09 ENCOUNTER — APPOINTMENT (OUTPATIENT)
Dept: RADIOLOGY | Facility: OTHER | Age: 56
End: 2021-02-09
Attending: OBSTETRICS & GYNECOLOGY
Payer: COMMERCIAL

## 2021-02-09 VITALS
DIASTOLIC BLOOD PRESSURE: 64 MMHG | BODY MASS INDEX: 36.78 KG/M2 | WEIGHT: 214.31 LBS | SYSTOLIC BLOOD PRESSURE: 132 MMHG

## 2021-02-09 DIAGNOSIS — Z11.51 SCREENING FOR HPV (HUMAN PAPILLOMAVIRUS): ICD-10-CM

## 2021-02-09 DIAGNOSIS — Z01.419 ENCOUNTER FOR GYNECOLOGICAL EXAMINATION: ICD-10-CM

## 2021-02-09 DIAGNOSIS — Z12.4 SCREENING FOR MALIGNANT NEOPLASM OF THE CERVIX: Primary | ICD-10-CM

## 2021-02-09 DIAGNOSIS — Z12.31 ENCOUNTER FOR SCREENING MAMMOGRAM FOR BREAST CANCER: ICD-10-CM

## 2021-02-09 DIAGNOSIS — N94.10 DYSPAREUNIA IN FEMALE: ICD-10-CM

## 2021-02-09 DIAGNOSIS — Z13.21 ENCOUNTER FOR VITAMIN DEFICIENCY SCREENING: ICD-10-CM

## 2021-02-09 DIAGNOSIS — Z78.0 MENOPAUSE: ICD-10-CM

## 2021-02-09 PROCEDURE — 77063 MAMMO DIGITAL SCREENING BILAT WITH TOMO: ICD-10-PCS | Mod: 26,,, | Performed by: RADIOLOGY

## 2021-02-09 PROCEDURE — 3008F BODY MASS INDEX DOCD: CPT | Mod: CPTII,S$GLB,, | Performed by: OBSTETRICS & GYNECOLOGY

## 2021-02-09 PROCEDURE — 88175 CYTOPATH C/V AUTO FLUID REDO: CPT | Performed by: OBSTETRICS & GYNECOLOGY

## 2021-02-09 PROCEDURE — 99386 PREV VISIT NEW AGE 40-64: CPT | Mod: S$GLB,,, | Performed by: OBSTETRICS & GYNECOLOGY

## 2021-02-09 PROCEDURE — 1126F AMNT PAIN NOTED NONE PRSNT: CPT | Mod: S$GLB,,, | Performed by: OBSTETRICS & GYNECOLOGY

## 2021-02-09 PROCEDURE — 3078F DIAST BP <80 MM HG: CPT | Mod: CPTII,S$GLB,, | Performed by: OBSTETRICS & GYNECOLOGY

## 2021-02-09 PROCEDURE — 99386 PR PREVENTIVE VISIT,NEW,40-64: ICD-10-PCS | Mod: S$GLB,,, | Performed by: OBSTETRICS & GYNECOLOGY

## 2021-02-09 PROCEDURE — 77067 SCR MAMMO BI INCL CAD: CPT | Mod: TC,PN

## 2021-02-09 PROCEDURE — 3008F PR BODY MASS INDEX (BMI) DOCUMENTED: ICD-10-PCS | Mod: CPTII,S$GLB,, | Performed by: OBSTETRICS & GYNECOLOGY

## 2021-02-09 PROCEDURE — 99999 PR PBB SHADOW E&M-EST. PATIENT-LVL III: ICD-10-PCS | Mod: PBBFAC,,, | Performed by: OBSTETRICS & GYNECOLOGY

## 2021-02-09 PROCEDURE — 99999 PR PBB SHADOW E&M-EST. PATIENT-LVL III: CPT | Mod: PBBFAC,,, | Performed by: OBSTETRICS & GYNECOLOGY

## 2021-02-09 PROCEDURE — 1126F PR PAIN SEVERITY QUANTIFIED, NO PAIN PRESENT: ICD-10-PCS | Mod: S$GLB,,, | Performed by: OBSTETRICS & GYNECOLOGY

## 2021-02-09 PROCEDURE — 3078F PR MOST RECENT DIASTOLIC BLOOD PRESSURE < 80 MM HG: ICD-10-PCS | Mod: CPTII,S$GLB,, | Performed by: OBSTETRICS & GYNECOLOGY

## 2021-02-09 PROCEDURE — 77063 BREAST TOMOSYNTHESIS BI: CPT | Mod: 26,,, | Performed by: RADIOLOGY

## 2021-02-09 PROCEDURE — 77067 SCR MAMMO BI INCL CAD: CPT | Mod: 26,,, | Performed by: RADIOLOGY

## 2021-02-09 PROCEDURE — 3075F SYST BP GE 130 - 139MM HG: CPT | Mod: CPTII,S$GLB,, | Performed by: OBSTETRICS & GYNECOLOGY

## 2021-02-09 PROCEDURE — 3075F PR MOST RECENT SYSTOLIC BLOOD PRESS GE 130-139MM HG: ICD-10-PCS | Mod: CPTII,S$GLB,, | Performed by: OBSTETRICS & GYNECOLOGY

## 2021-02-09 PROCEDURE — 77067 MAMMO DIGITAL SCREENING BILAT WITH TOMO: ICD-10-PCS | Mod: 26,,, | Performed by: RADIOLOGY

## 2021-02-09 PROCEDURE — 87624 HPV HI-RISK TYP POOLED RSLT: CPT

## 2021-02-09 RX ORDER — PRAVASTATIN SODIUM 10 MG/1
1 TABLET ORAL
COMMUNITY
Start: 2020-10-09 | End: 2021-03-01

## 2021-02-09 RX ORDER — PRASTERONE 6.5 MG/1
6.5 INSERT VAGINAL NIGHTLY
Qty: 30 EACH | Refills: 11 | Status: SHIPPED | OUTPATIENT
Start: 2021-02-09 | End: 2021-03-01

## 2021-02-09 RX ORDER — OMEPRAZOLE 20 MG/1
20 CAPSULE, DELAYED RELEASE ORAL
COMMUNITY
Start: 2020-09-28 | End: 2021-03-01

## 2021-02-11 LAB
CANDIDA RRNA VAG QL PROBE: NEGATIVE
G VAGINALIS RRNA GENITAL QL PROBE: NEGATIVE
T VAGINALIS RRNA GENITAL QL PROBE: NEGATIVE

## 2021-02-12 ENCOUNTER — TELEPHONE (OUTPATIENT)
Dept: OBSTETRICS AND GYNECOLOGY | Facility: CLINIC | Age: 56
End: 2021-02-12

## 2021-02-22 ENCOUNTER — PATIENT MESSAGE (OUTPATIENT)
Dept: OBSTETRICS AND GYNECOLOGY | Facility: CLINIC | Age: 56
End: 2021-02-22

## 2021-02-22 DIAGNOSIS — N94.10 DYSPAREUNIA IN FEMALE: Primary | ICD-10-CM

## 2021-02-22 LAB
HPV HR 12 DNA SPEC QL NAA+PROBE: NEGATIVE
HPV16 AG SPEC QL: NEGATIVE
HPV18 DNA SPEC QL NAA+PROBE: NEGATIVE

## 2021-02-22 RX ORDER — ESTRADIOL 10 UG/1
INSERT VAGINAL
Qty: 18 TABLET | Refills: 0 | Status: SHIPPED | OUTPATIENT
Start: 2021-02-22 | End: 2022-01-13

## 2021-02-22 RX ORDER — ESTRADIOL 10 UG/1
INSERT VAGINAL
Qty: 30 TABLET | Refills: 11 | Status: SHIPPED | OUTPATIENT
Start: 2021-02-22 | End: 2021-02-22

## 2021-02-22 RX ORDER — ESTRADIOL 10 UG/1
INSERT VAGINAL
Qty: 24 TABLET | Refills: 3 | Status: SHIPPED | OUTPATIENT
Start: 2021-02-22 | End: 2021-03-01

## 2021-02-25 ENCOUNTER — TELEPHONE (OUTPATIENT)
Dept: BARIATRICS | Facility: CLINIC | Age: 56
End: 2021-02-25

## 2021-02-25 ENCOUNTER — LAB VISIT (OUTPATIENT)
Dept: LAB | Facility: HOSPITAL | Age: 56
End: 2021-02-25
Attending: INTERNAL MEDICINE
Payer: COMMERCIAL

## 2021-02-25 DIAGNOSIS — N18.30 STAGE 3 CHRONIC KIDNEY DISEASE, UNSPECIFIED WHETHER STAGE 3A OR 3B CKD: ICD-10-CM

## 2021-02-25 LAB
ALBUMIN SERPL BCP-MCNC: 3.4 G/DL (ref 3.5–5.2)
ANION GAP SERPL CALC-SCNC: 11 MMOL/L (ref 8–16)
BUN SERPL-MCNC: 47 MG/DL (ref 6–20)
CALCIUM SERPL-MCNC: 8.7 MG/DL (ref 8.7–10.5)
CHLORIDE SERPL-SCNC: 106 MMOL/L (ref 95–110)
CO2 SERPL-SCNC: 26 MMOL/L (ref 23–29)
CREAT SERPL-MCNC: 2.4 MG/DL (ref 0.5–1.4)
EST. GFR  (AFRICAN AMERICAN): 25 ML/MIN/1.73 M^2
EST. GFR  (NON AFRICAN AMERICAN): 22 ML/MIN/1.73 M^2
GLUCOSE SERPL-MCNC: 90 MG/DL (ref 70–110)
PHOSPHATE SERPL-MCNC: 4.2 MG/DL (ref 2.7–4.5)
POTASSIUM SERPL-SCNC: 4.1 MMOL/L (ref 3.5–5.1)
PTH-INTACT SERPL-MCNC: 245.6 PG/ML (ref 9–77)
SODIUM SERPL-SCNC: 143 MMOL/L (ref 136–145)

## 2021-02-25 PROCEDURE — 36415 COLL VENOUS BLD VENIPUNCTURE: CPT | Mod: PO

## 2021-02-25 PROCEDURE — 80069 RENAL FUNCTION PANEL: CPT

## 2021-02-25 PROCEDURE — 83970 ASSAY OF PARATHORMONE: CPT

## 2021-03-01 ENCOUNTER — OFFICE VISIT (OUTPATIENT)
Dept: NEPHROLOGY | Facility: CLINIC | Age: 56
End: 2021-03-01
Payer: COMMERCIAL

## 2021-03-01 VITALS — SYSTOLIC BLOOD PRESSURE: 134 MMHG | DIASTOLIC BLOOD PRESSURE: 79 MMHG

## 2021-03-01 DIAGNOSIS — R80.1 PERSISTENT PROTEINURIA: ICD-10-CM

## 2021-03-01 DIAGNOSIS — N18.30 STAGE 3 CHRONIC KIDNEY DISEASE, UNSPECIFIED WHETHER STAGE 3A OR 3B CKD: Primary | ICD-10-CM

## 2021-03-01 DIAGNOSIS — N17.9 AKI (ACUTE KIDNEY INJURY): ICD-10-CM

## 2021-03-01 PROCEDURE — 99214 PR OFFICE/OUTPT VISIT, EST, LEVL IV, 30-39 MIN: ICD-10-PCS | Mod: 95,,, | Performed by: INTERNAL MEDICINE

## 2021-03-01 PROCEDURE — 3075F PR MOST RECENT SYSTOLIC BLOOD PRESS GE 130-139MM HG: ICD-10-PCS | Mod: CPTII,95,, | Performed by: INTERNAL MEDICINE

## 2021-03-01 PROCEDURE — 99214 OFFICE O/P EST MOD 30 MIN: CPT | Mod: 95,,, | Performed by: INTERNAL MEDICINE

## 2021-03-01 PROCEDURE — 3078F DIAST BP <80 MM HG: CPT | Mod: CPTII,95,, | Performed by: INTERNAL MEDICINE

## 2021-03-01 PROCEDURE — 3078F PR MOST RECENT DIASTOLIC BLOOD PRESSURE < 80 MM HG: ICD-10-PCS | Mod: CPTII,95,, | Performed by: INTERNAL MEDICINE

## 2021-03-01 PROCEDURE — 3075F SYST BP GE 130 - 139MM HG: CPT | Mod: CPTII,95,, | Performed by: INTERNAL MEDICINE

## 2021-03-01 RX ORDER — CALCITRIOL 0.25 UG/1
0.25 CAPSULE ORAL DAILY
Qty: 90 CAPSULE | Refills: 1 | Status: SHIPPED | OUTPATIENT
Start: 2021-03-01 | End: 2021-06-11 | Stop reason: SDUPTHER

## 2021-03-05 ENCOUNTER — APPOINTMENT (OUTPATIENT)
Dept: RADIOLOGY | Facility: CLINIC | Age: 56
End: 2021-03-05
Attending: OBSTETRICS & GYNECOLOGY
Payer: COMMERCIAL

## 2021-03-05 DIAGNOSIS — Z78.0 MENOPAUSE: ICD-10-CM

## 2021-03-05 PROCEDURE — 77080 DEXA BONE DENSITY SPINE HIP: ICD-10-PCS | Mod: 26,,, | Performed by: INTERNAL MEDICINE

## 2021-03-05 PROCEDURE — 77080 DXA BONE DENSITY AXIAL: CPT | Mod: 26,,, | Performed by: INTERNAL MEDICINE

## 2021-03-05 PROCEDURE — 77080 DXA BONE DENSITY AXIAL: CPT | Mod: TC,PO

## 2021-03-06 LAB
FINAL PATHOLOGIC DIAGNOSIS: NORMAL
Lab: NORMAL

## 2021-03-08 ENCOUNTER — TELEPHONE (OUTPATIENT)
Dept: BARIATRICS | Facility: CLINIC | Age: 56
End: 2021-03-08

## 2021-03-12 ENCOUNTER — LAB VISIT (OUTPATIENT)
Dept: LAB | Facility: HOSPITAL | Age: 56
End: 2021-03-12
Attending: INTERNAL MEDICINE
Payer: COMMERCIAL

## 2021-03-12 DIAGNOSIS — N17.9 AKI (ACUTE KIDNEY INJURY): ICD-10-CM

## 2021-03-12 DIAGNOSIS — R80.1 PERSISTENT PROTEINURIA: ICD-10-CM

## 2021-03-12 DIAGNOSIS — N18.30 STAGE 3 CHRONIC KIDNEY DISEASE, UNSPECIFIED WHETHER STAGE 3A OR 3B CKD: ICD-10-CM

## 2021-03-12 LAB
ALBUMIN SERPL BCP-MCNC: 3.6 G/DL (ref 3.5–5.2)
ANION GAP SERPL CALC-SCNC: 8 MMOL/L (ref 8–16)
BUN SERPL-MCNC: 24 MG/DL (ref 6–20)
CALCIUM SERPL-MCNC: 8.9 MG/DL (ref 8.7–10.5)
CHLORIDE SERPL-SCNC: 102 MMOL/L (ref 95–110)
CO2 SERPL-SCNC: 31 MMOL/L (ref 23–29)
CREAT SERPL-MCNC: 2 MG/DL (ref 0.5–1.4)
EST. GFR  (AFRICAN AMERICAN): 31 ML/MIN/1.73 M^2
EST. GFR  (NON AFRICAN AMERICAN): 27 ML/MIN/1.73 M^2
GLUCOSE SERPL-MCNC: 85 MG/DL (ref 70–110)
PHOSPHATE SERPL-MCNC: 3.4 MG/DL (ref 2.7–4.5)
POTASSIUM SERPL-SCNC: 5 MMOL/L (ref 3.5–5.1)
SODIUM SERPL-SCNC: 141 MMOL/L (ref 136–145)

## 2021-03-12 PROCEDURE — 80069 RENAL FUNCTION PANEL: CPT | Performed by: INTERNAL MEDICINE

## 2021-03-12 PROCEDURE — 36415 COLL VENOUS BLD VENIPUNCTURE: CPT | Mod: PO | Performed by: INTERNAL MEDICINE

## 2021-03-21 ENCOUNTER — PATIENT MESSAGE (OUTPATIENT)
Dept: OBSTETRICS AND GYNECOLOGY | Facility: CLINIC | Age: 56
End: 2021-03-21

## 2021-04-15 ENCOUNTER — PATIENT MESSAGE (OUTPATIENT)
Dept: RESEARCH | Facility: HOSPITAL | Age: 56
End: 2021-04-15

## 2021-04-22 ENCOUNTER — LAB VISIT (OUTPATIENT)
Dept: LAB | Facility: HOSPITAL | Age: 56
End: 2021-04-22
Attending: OBSTETRICS & GYNECOLOGY
Payer: COMMERCIAL

## 2021-04-22 DIAGNOSIS — Z13.21 ENCOUNTER FOR VITAMIN DEFICIENCY SCREENING: ICD-10-CM

## 2021-04-22 DIAGNOSIS — Z78.0 MENOPAUSE: ICD-10-CM

## 2021-04-22 LAB
DHEA-S SERPL-MCNC: 43.3 UG/DL (ref 29.7–182.2)
ESTRADIOL SERPL-MCNC: <10 PG/ML
PROGEST SERPL-MCNC: 0.1 NG/ML
TESTOST SERPL-MCNC: 14 NG/DL (ref 5–73)
VIT B12 SERPL-MCNC: 900 PG/ML (ref 210–950)

## 2021-04-22 PROCEDURE — 84402 ASSAY OF FREE TESTOSTERONE: CPT | Performed by: OBSTETRICS & GYNECOLOGY

## 2021-04-22 PROCEDURE — 82627 DEHYDROEPIANDROSTERONE: CPT | Performed by: OBSTETRICS & GYNECOLOGY

## 2021-04-22 PROCEDURE — 82306 VITAMIN D 25 HYDROXY: CPT | Performed by: OBSTETRICS & GYNECOLOGY

## 2021-04-22 PROCEDURE — 84403 ASSAY OF TOTAL TESTOSTERONE: CPT | Performed by: OBSTETRICS & GYNECOLOGY

## 2021-04-22 PROCEDURE — 82670 ASSAY OF TOTAL ESTRADIOL: CPT | Performed by: OBSTETRICS & GYNECOLOGY

## 2021-04-22 PROCEDURE — 82607 VITAMIN B-12: CPT | Performed by: OBSTETRICS & GYNECOLOGY

## 2021-04-22 PROCEDURE — 84144 ASSAY OF PROGESTERONE: CPT | Performed by: OBSTETRICS & GYNECOLOGY

## 2021-04-23 LAB — 25(OH)D3+25(OH)D2 SERPL-MCNC: 41 NG/ML (ref 30–96)

## 2021-04-27 ENCOUNTER — PATIENT MESSAGE (OUTPATIENT)
Dept: OBSTETRICS AND GYNECOLOGY | Facility: CLINIC | Age: 56
End: 2021-04-27

## 2021-04-27 ENCOUNTER — OFFICE VISIT (OUTPATIENT)
Dept: OBSTETRICS AND GYNECOLOGY | Facility: CLINIC | Age: 56
End: 2021-04-27
Attending: OBSTETRICS & GYNECOLOGY
Payer: COMMERCIAL

## 2021-04-27 VITALS
DIASTOLIC BLOOD PRESSURE: 72 MMHG | HEIGHT: 64 IN | BODY MASS INDEX: 37.55 KG/M2 | WEIGHT: 219.94 LBS | SYSTOLIC BLOOD PRESSURE: 112 MMHG

## 2021-04-27 DIAGNOSIS — Z00.00 PERIODIC HEALTH ASSESSMENT, GENERAL SCREENING, ADULT: Primary | ICD-10-CM

## 2021-04-27 DIAGNOSIS — Z78.0 MENOPAUSE: ICD-10-CM

## 2021-04-27 LAB — TESTOST FREE SERPL-MCNC: 0.5 PG/ML

## 2021-04-27 PROCEDURE — 99999 PR PBB SHADOW E&M-EST. PATIENT-LVL III: ICD-10-PCS | Mod: PBBFAC,,, | Performed by: OBSTETRICS & GYNECOLOGY

## 2021-04-27 PROCEDURE — 1126F PR PAIN SEVERITY QUANTIFIED, NO PAIN PRESENT: ICD-10-PCS | Mod: S$GLB,,, | Performed by: OBSTETRICS & GYNECOLOGY

## 2021-04-27 PROCEDURE — 99214 OFFICE O/P EST MOD 30 MIN: CPT | Mod: S$GLB,,, | Performed by: OBSTETRICS & GYNECOLOGY

## 2021-04-27 PROCEDURE — 3008F BODY MASS INDEX DOCD: CPT | Mod: CPTII,S$GLB,, | Performed by: OBSTETRICS & GYNECOLOGY

## 2021-04-27 PROCEDURE — 99999 PR PBB SHADOW E&M-EST. PATIENT-LVL III: CPT | Mod: PBBFAC,,, | Performed by: OBSTETRICS & GYNECOLOGY

## 2021-04-27 PROCEDURE — 99214 PR OFFICE/OUTPT VISIT, EST, LEVL IV, 30-39 MIN: ICD-10-PCS | Mod: S$GLB,,, | Performed by: OBSTETRICS & GYNECOLOGY

## 2021-04-27 PROCEDURE — 1126F AMNT PAIN NOTED NONE PRSNT: CPT | Mod: S$GLB,,, | Performed by: OBSTETRICS & GYNECOLOGY

## 2021-04-27 PROCEDURE — 3008F PR BODY MASS INDEX (BMI) DOCUMENTED: ICD-10-PCS | Mod: CPTII,S$GLB,, | Performed by: OBSTETRICS & GYNECOLOGY

## 2021-04-27 RX ORDER — PROGESTERONE 100 MG/1
CAPSULE ORAL
Qty: 30 CAPSULE | Refills: 11 | Status: SHIPPED | OUTPATIENT
Start: 2021-04-27 | End: 2021-06-17

## 2021-04-27 RX ORDER — ESTRADIOL 0.05 MG/D
FILM, EXTENDED RELEASE TRANSDERMAL
Qty: 8 PATCH | Refills: 11 | Status: SHIPPED | OUTPATIENT
Start: 2021-04-27 | End: 2021-06-17

## 2021-04-28 DIAGNOSIS — B37.2 INTERTRIGINOUS CANDIDIASIS: Primary | ICD-10-CM

## 2021-04-28 RX ORDER — CLOTRIMAZOLE AND BETAMETHASONE DIPROPIONATE 10; .64 MG/G; MG/G
CREAM TOPICAL
Qty: 15 G | Refills: 1 | Status: SHIPPED | OUTPATIENT
Start: 2021-04-28 | End: 2022-01-13

## 2021-05-07 ENCOUNTER — LAB VISIT (OUTPATIENT)
Dept: LAB | Facility: HOSPITAL | Age: 56
End: 2021-05-07
Attending: OBSTETRICS & GYNECOLOGY
Payer: COMMERCIAL

## 2021-05-07 DIAGNOSIS — Z00.00 PERIODIC HEALTH ASSESSMENT, GENERAL SCREENING, ADULT: ICD-10-CM

## 2021-06-04 ENCOUNTER — LAB VISIT (OUTPATIENT)
Dept: LAB | Facility: HOSPITAL | Age: 56
End: 2021-06-04
Attending: INTERNAL MEDICINE
Payer: COMMERCIAL

## 2021-06-04 DIAGNOSIS — N18.30 STAGE 3 CHRONIC KIDNEY DISEASE, UNSPECIFIED WHETHER STAGE 3A OR 3B CKD: ICD-10-CM

## 2021-06-04 DIAGNOSIS — R80.1 PERSISTENT PROTEINURIA: ICD-10-CM

## 2021-06-04 DIAGNOSIS — N17.9 AKI (ACUTE KIDNEY INJURY): ICD-10-CM

## 2021-06-04 LAB
ALBUMIN SERPL BCP-MCNC: 3.4 G/DL (ref 3.5–5.2)
ANION GAP SERPL CALC-SCNC: 9 MMOL/L (ref 8–16)
BUN SERPL-MCNC: 34 MG/DL (ref 6–20)
CALCIUM SERPL-MCNC: 9.5 MG/DL (ref 8.7–10.5)
CHLORIDE SERPL-SCNC: 109 MMOL/L (ref 95–110)
CO2 SERPL-SCNC: 24 MMOL/L (ref 23–29)
CREAT SERPL-MCNC: 2.1 MG/DL (ref 0.5–1.4)
EST. GFR  (AFRICAN AMERICAN): 29.7 ML/MIN/1.73 M^2
EST. GFR  (NON AFRICAN AMERICAN): 25.7 ML/MIN/1.73 M^2
GLUCOSE SERPL-MCNC: 84 MG/DL (ref 70–110)
PHOSPHATE SERPL-MCNC: 3.6 MG/DL (ref 2.7–4.5)
POTASSIUM SERPL-SCNC: 4.2 MMOL/L (ref 3.5–5.1)
PTH-INTACT SERPL-MCNC: 285 PG/ML (ref 9–77)
SODIUM SERPL-SCNC: 142 MMOL/L (ref 136–145)

## 2021-06-04 PROCEDURE — 80069 RENAL FUNCTION PANEL: CPT | Performed by: INTERNAL MEDICINE

## 2021-06-04 PROCEDURE — 83970 ASSAY OF PARATHORMONE: CPT | Performed by: INTERNAL MEDICINE

## 2021-06-04 PROCEDURE — 36415 COLL VENOUS BLD VENIPUNCTURE: CPT | Mod: PO | Performed by: INTERNAL MEDICINE

## 2021-06-11 ENCOUNTER — OFFICE VISIT (OUTPATIENT)
Dept: NEPHROLOGY | Facility: CLINIC | Age: 56
End: 2021-06-11
Payer: COMMERCIAL

## 2021-06-11 DIAGNOSIS — N25.81 SECONDARY RENAL HYPERPARATHYROIDISM: ICD-10-CM

## 2021-06-11 DIAGNOSIS — R80.1 PERSISTENT PROTEINURIA: ICD-10-CM

## 2021-06-11 DIAGNOSIS — N18.30 STAGE 3 CHRONIC KIDNEY DISEASE, UNSPECIFIED WHETHER STAGE 3A OR 3B CKD: Primary | ICD-10-CM

## 2021-06-11 DIAGNOSIS — N28.1 ACQUIRED CYST OF KIDNEY: ICD-10-CM

## 2021-06-11 PROCEDURE — 99214 OFFICE O/P EST MOD 30 MIN: CPT | Mod: 95,,, | Performed by: INTERNAL MEDICINE

## 2021-06-11 PROCEDURE — 99214 PR OFFICE/OUTPT VISIT, EST, LEVL IV, 30-39 MIN: ICD-10-PCS | Mod: 95,,, | Performed by: INTERNAL MEDICINE

## 2021-06-11 RX ORDER — CALCITRIOL 0.25 UG/1
0.25 CAPSULE ORAL DAILY
Qty: 90 CAPSULE | Refills: 1 | Status: SHIPPED | OUTPATIENT
Start: 2021-06-11 | End: 2022-03-15

## 2021-06-17 ENCOUNTER — OFFICE VISIT (OUTPATIENT)
Dept: OBSTETRICS AND GYNECOLOGY | Facility: CLINIC | Age: 56
End: 2021-06-17
Attending: OBSTETRICS & GYNECOLOGY
Payer: COMMERCIAL

## 2021-06-17 VITALS
DIASTOLIC BLOOD PRESSURE: 72 MMHG | SYSTOLIC BLOOD PRESSURE: 138 MMHG | HEIGHT: 64 IN | BODY MASS INDEX: 36.19 KG/M2 | WEIGHT: 212 LBS

## 2021-06-17 DIAGNOSIS — Z78.0 MENOPAUSE: Primary | ICD-10-CM

## 2021-06-17 PROCEDURE — 99214 PR OFFICE/OUTPT VISIT, EST, LEVL IV, 30-39 MIN: ICD-10-PCS | Mod: S$GLB,,, | Performed by: OBSTETRICS & GYNECOLOGY

## 2021-06-17 PROCEDURE — 1126F AMNT PAIN NOTED NONE PRSNT: CPT | Mod: S$GLB,,, | Performed by: OBSTETRICS & GYNECOLOGY

## 2021-06-17 PROCEDURE — 99999 PR PBB SHADOW E&M-EST. PATIENT-LVL III: ICD-10-PCS | Mod: PBBFAC,,, | Performed by: OBSTETRICS & GYNECOLOGY

## 2021-06-17 PROCEDURE — 99214 OFFICE O/P EST MOD 30 MIN: CPT | Mod: S$GLB,,, | Performed by: OBSTETRICS & GYNECOLOGY

## 2021-06-17 PROCEDURE — 1126F PR PAIN SEVERITY QUANTIFIED, NO PAIN PRESENT: ICD-10-PCS | Mod: S$GLB,,, | Performed by: OBSTETRICS & GYNECOLOGY

## 2021-06-17 PROCEDURE — 3008F PR BODY MASS INDEX (BMI) DOCUMENTED: ICD-10-PCS | Mod: CPTII,S$GLB,, | Performed by: OBSTETRICS & GYNECOLOGY

## 2021-06-17 PROCEDURE — 3008F BODY MASS INDEX DOCD: CPT | Mod: CPTII,S$GLB,, | Performed by: OBSTETRICS & GYNECOLOGY

## 2021-06-17 PROCEDURE — 99999 PR PBB SHADOW E&M-EST. PATIENT-LVL III: CPT | Mod: PBBFAC,,, | Performed by: OBSTETRICS & GYNECOLOGY

## 2021-06-17 RX ORDER — ESTRADIOL 0.1 MG/D
1 PATCH, EXTENDED RELEASE TRANSDERMAL
Qty: 24 PATCH | Refills: 3 | Status: SHIPPED | OUTPATIENT
Start: 2021-06-17 | End: 2021-11-19

## 2021-06-17 RX ORDER — PROGESTERONE 200 MG/1
CAPSULE ORAL
Qty: 90 CAPSULE | Refills: 3 | Status: SHIPPED | OUTPATIENT
Start: 2021-06-17 | End: 2021-11-19

## 2021-06-21 ENCOUNTER — OFFICE VISIT (OUTPATIENT)
Dept: URGENT CARE | Facility: CLINIC | Age: 56
End: 2021-06-21
Payer: COMMERCIAL

## 2021-06-21 VITALS
SYSTOLIC BLOOD PRESSURE: 139 MMHG | WEIGHT: 211 LBS | BODY MASS INDEX: 36.02 KG/M2 | HEART RATE: 74 BPM | RESPIRATION RATE: 16 BRPM | DIASTOLIC BLOOD PRESSURE: 71 MMHG | OXYGEN SATURATION: 100 % | HEIGHT: 64 IN | TEMPERATURE: 98 F

## 2021-06-21 DIAGNOSIS — S63.621A SPRAIN OF INTERPHALANGEAL JOINT OF RIGHT THUMB, INITIAL ENCOUNTER: Primary | ICD-10-CM

## 2021-06-21 PROCEDURE — 1125F AMNT PAIN NOTED PAIN PRSNT: CPT | Mod: S$GLB,,, | Performed by: NURSE PRACTITIONER

## 2021-06-21 PROCEDURE — 99213 OFFICE O/P EST LOW 20 MIN: CPT | Mod: S$GLB,,, | Performed by: NURSE PRACTITIONER

## 2021-06-21 PROCEDURE — 3008F PR BODY MASS INDEX (BMI) DOCUMENTED: ICD-10-PCS | Mod: CPTII,S$GLB,, | Performed by: NURSE PRACTITIONER

## 2021-06-21 PROCEDURE — 1125F PR PAIN SEVERITY QUANTIFIED, PAIN PRESENT: ICD-10-PCS | Mod: S$GLB,,, | Performed by: NURSE PRACTITIONER

## 2021-06-21 PROCEDURE — 3008F BODY MASS INDEX DOCD: CPT | Mod: CPTII,S$GLB,, | Performed by: NURSE PRACTITIONER

## 2021-06-21 PROCEDURE — 99213 PR OFFICE/OUTPT VISIT, EST, LEVL III, 20-29 MIN: ICD-10-PCS | Mod: S$GLB,,, | Performed by: NURSE PRACTITIONER

## 2021-10-15 ENCOUNTER — TELEPHONE (OUTPATIENT)
Dept: OBSTETRICS AND GYNECOLOGY | Facility: CLINIC | Age: 56
End: 2021-10-15

## 2021-10-18 ENCOUNTER — PATIENT MESSAGE (OUTPATIENT)
Dept: NEPHROLOGY | Facility: CLINIC | Age: 56
End: 2021-10-18
Payer: COMMERCIAL

## 2021-10-18 ENCOUNTER — OFFICE VISIT (OUTPATIENT)
Dept: OBSTETRICS AND GYNECOLOGY | Facility: CLINIC | Age: 56
End: 2021-10-18
Attending: OBSTETRICS & GYNECOLOGY
Payer: COMMERCIAL

## 2021-10-18 ENCOUNTER — TELEPHONE (OUTPATIENT)
Dept: OBSTETRICS AND GYNECOLOGY | Facility: CLINIC | Age: 56
End: 2021-10-18
Payer: COMMERCIAL

## 2021-10-18 ENCOUNTER — PATIENT MESSAGE (OUTPATIENT)
Dept: OBSTETRICS AND GYNECOLOGY | Facility: CLINIC | Age: 56
End: 2021-10-18

## 2021-10-18 ENCOUNTER — TELEPHONE (OUTPATIENT)
Dept: GYNECOLOGIC ONCOLOGY | Facility: CLINIC | Age: 56
End: 2021-10-18

## 2021-10-18 VITALS
WEIGHT: 216.94 LBS | DIASTOLIC BLOOD PRESSURE: 90 MMHG | BODY MASS INDEX: 38.44 KG/M2 | HEIGHT: 63 IN | SYSTOLIC BLOOD PRESSURE: 150 MMHG

## 2021-10-18 DIAGNOSIS — N88.8 CERVICAL MASS: ICD-10-CM

## 2021-10-18 DIAGNOSIS — R10.2 PELVIC PAIN IN FEMALE: Primary | ICD-10-CM

## 2021-10-18 PROCEDURE — 3066F NEPHROPATHY DOC TX: CPT | Mod: CPTII,S$GLB,, | Performed by: OBSTETRICS & GYNECOLOGY

## 2021-10-18 PROCEDURE — 99999 PR PBB SHADOW E&M-EST. PATIENT-LVL III: ICD-10-PCS | Mod: PBBFAC,,, | Performed by: OBSTETRICS & GYNECOLOGY

## 2021-10-18 PROCEDURE — 99213 PR OFFICE/OUTPT VISIT, EST, LEVL III, 20-29 MIN: ICD-10-PCS | Mod: S$GLB,,, | Performed by: OBSTETRICS & GYNECOLOGY

## 2021-10-18 PROCEDURE — 1159F PR MEDICATION LIST DOCUMENTED IN MEDICAL RECORD: ICD-10-PCS | Mod: CPTII,S$GLB,, | Performed by: OBSTETRICS & GYNECOLOGY

## 2021-10-18 PROCEDURE — 99213 OFFICE O/P EST LOW 20 MIN: CPT | Mod: S$GLB,,, | Performed by: OBSTETRICS & GYNECOLOGY

## 2021-10-18 PROCEDURE — 3080F DIAST BP >= 90 MM HG: CPT | Mod: CPTII,S$GLB,, | Performed by: OBSTETRICS & GYNECOLOGY

## 2021-10-18 PROCEDURE — 99999 PR PBB SHADOW E&M-EST. PATIENT-LVL III: CPT | Mod: PBBFAC,,, | Performed by: OBSTETRICS & GYNECOLOGY

## 2021-10-18 PROCEDURE — 1159F MED LIST DOCD IN RCRD: CPT | Mod: CPTII,S$GLB,, | Performed by: OBSTETRICS & GYNECOLOGY

## 2021-10-18 PROCEDURE — 3008F PR BODY MASS INDEX (BMI) DOCUMENTED: ICD-10-PCS | Mod: CPTII,S$GLB,, | Performed by: OBSTETRICS & GYNECOLOGY

## 2021-10-18 PROCEDURE — 3080F PR MOST RECENT DIASTOLIC BLOOD PRESSURE >= 90 MM HG: ICD-10-PCS | Mod: CPTII,S$GLB,, | Performed by: OBSTETRICS & GYNECOLOGY

## 2021-10-18 PROCEDURE — 3008F BODY MASS INDEX DOCD: CPT | Mod: CPTII,S$GLB,, | Performed by: OBSTETRICS & GYNECOLOGY

## 2021-10-18 PROCEDURE — 3077F SYST BP >= 140 MM HG: CPT | Mod: CPTII,S$GLB,, | Performed by: OBSTETRICS & GYNECOLOGY

## 2021-10-18 PROCEDURE — 3077F PR MOST RECENT SYSTOLIC BLOOD PRESSURE >= 140 MM HG: ICD-10-PCS | Mod: CPTII,S$GLB,, | Performed by: OBSTETRICS & GYNECOLOGY

## 2021-10-18 PROCEDURE — 3066F PR DOCUMENTATION OF TREATMENT FOR NEPHROPATHY: ICD-10-PCS | Mod: CPTII,S$GLB,, | Performed by: OBSTETRICS & GYNECOLOGY

## 2021-10-18 NOTE — TELEPHONE ENCOUNTER
Pt called back, I was able to log into [email or web address] with a one time access code she provided.  Was able to print the pelvic US report before the website stopped responding and was kicked off.  Pt was not able to access another code.      Dr. Roldan, see previous note from when I originally spoke with pt.  I have the US report, will fax to the [facility] office for you to review.  If she gets another access code, I will try to print the vaginal US and CT report as well.

## 2021-10-18 NOTE — TELEPHONE ENCOUNTER
Pt states she has been having lower abd/pelvic pain for about 3 weeks and in the past week the pain has been severe.  Thought she was constipated because she has been having trouble passing stool but has never felt this severe of pain before.  Felt like she was having a baby.  Went to the ER at [facility], found a mass in cervix about 3-4 inches that is pressing on bladder and colon. Extreme pain when having BMs.  Had a CT scan and pelvic US at ER.  Will fax over records.     Informed pt we tried to call her twice on Friday, VM full.

## 2021-10-19 ENCOUNTER — PATIENT MESSAGE (OUTPATIENT)
Dept: OBSTETRICS AND GYNECOLOGY | Facility: CLINIC | Age: 56
End: 2021-10-19
Payer: COMMERCIAL

## 2021-10-19 ENCOUNTER — TELEPHONE (OUTPATIENT)
Dept: GYNECOLOGIC ONCOLOGY | Facility: CLINIC | Age: 56
End: 2021-10-19

## 2021-10-20 ENCOUNTER — OFFICE VISIT (OUTPATIENT)
Dept: GYNECOLOGIC ONCOLOGY | Facility: CLINIC | Age: 56
End: 2021-10-20
Attending: OBSTETRICS & GYNECOLOGY
Payer: COMMERCIAL

## 2021-10-20 ENCOUNTER — HOSPITAL ENCOUNTER (OUTPATIENT)
Dept: PREADMISSION TESTING | Facility: OTHER | Age: 56
Discharge: HOME | End: 2021-10-20
Attending: OBSTETRICS & GYNECOLOGY
Payer: COMMERCIAL

## 2021-10-20 ENCOUNTER — ANESTHESIA EVENT (OUTPATIENT)
Dept: SURGERY | Facility: OTHER | Age: 56
End: 2021-10-20
Payer: COMMERCIAL

## 2021-10-20 VITALS
BODY MASS INDEX: 36.7 KG/M2 | HEART RATE: 68 BPM | DIASTOLIC BLOOD PRESSURE: 71 MMHG | HEIGHT: 64 IN | SYSTOLIC BLOOD PRESSURE: 150 MMHG | TEMPERATURE: 98 F | OXYGEN SATURATION: 97 % | WEIGHT: 215 LBS | RESPIRATION RATE: 16 BRPM

## 2021-10-20 VITALS
SYSTOLIC BLOOD PRESSURE: 149 MMHG | HEART RATE: 62 BPM | DIASTOLIC BLOOD PRESSURE: 70 MMHG | WEIGHT: 215.63 LBS | BODY MASS INDEX: 38.19 KG/M2

## 2021-10-20 DIAGNOSIS — R80.1 PERSISTENT PROTEINURIA: Primary | ICD-10-CM

## 2021-10-20 DIAGNOSIS — Z01.818 PRE-OP TESTING: ICD-10-CM

## 2021-10-20 DIAGNOSIS — N18.30 STAGE 3 CHRONIC KIDNEY DISEASE, UNSPECIFIED WHETHER STAGE 3A OR 3B CKD: ICD-10-CM

## 2021-10-20 DIAGNOSIS — N88.8 CERVICAL MASS: ICD-10-CM

## 2021-10-20 DIAGNOSIS — R10.2 PELVIC PAIN IN FEMALE: ICD-10-CM

## 2021-10-20 DIAGNOSIS — N88.8 CERVICAL MASS: Primary | ICD-10-CM

## 2021-10-20 DIAGNOSIS — N88.8 MASS OF UTERINE CERVIX: ICD-10-CM

## 2021-10-20 LAB — SARS-COV-2 RDRP RESP QL NAA+PROBE: NEGATIVE

## 2021-10-20 PROCEDURE — 1159F PR MEDICATION LIST DOCUMENTED IN MEDICAL RECORD: ICD-10-PCS | Mod: CPTII,S$GLB,, | Performed by: OBSTETRICS & GYNECOLOGY

## 2021-10-20 PROCEDURE — 3066F PR DOCUMENTATION OF TREATMENT FOR NEPHROPATHY: ICD-10-PCS | Mod: CPTII,S$GLB,, | Performed by: OBSTETRICS & GYNECOLOGY

## 2021-10-20 PROCEDURE — 1159F MED LIST DOCD IN RCRD: CPT | Mod: CPTII,S$GLB,, | Performed by: OBSTETRICS & GYNECOLOGY

## 2021-10-20 PROCEDURE — 1160F PR REVIEW ALL MEDS BY PRESCRIBER/CLIN PHARMACIST DOCUMENTED: ICD-10-PCS | Mod: CPTII,S$GLB,, | Performed by: OBSTETRICS & GYNECOLOGY

## 2021-10-20 PROCEDURE — 99999 PR PBB SHADOW E&M-EST. PATIENT-LVL III: ICD-10-PCS | Mod: PBBFAC,,, | Performed by: OBSTETRICS & GYNECOLOGY

## 2021-10-20 PROCEDURE — 3077F PR MOST RECENT SYSTOLIC BLOOD PRESSURE >= 140 MM HG: ICD-10-PCS | Mod: CPTII,S$GLB,, | Performed by: OBSTETRICS & GYNECOLOGY

## 2021-10-20 PROCEDURE — 3008F PR BODY MASS INDEX (BMI) DOCUMENTED: ICD-10-PCS | Mod: CPTII,S$GLB,, | Performed by: OBSTETRICS & GYNECOLOGY

## 2021-10-20 PROCEDURE — 99205 OFFICE O/P NEW HI 60 MIN: CPT | Mod: S$GLB,,, | Performed by: OBSTETRICS & GYNECOLOGY

## 2021-10-20 PROCEDURE — 99205 PR OFFICE/OUTPT VISIT, NEW, LEVL V, 60-74 MIN: ICD-10-PCS | Mod: S$GLB,,, | Performed by: OBSTETRICS & GYNECOLOGY

## 2021-10-20 PROCEDURE — 1160F RVW MEDS BY RX/DR IN RCRD: CPT | Mod: CPTII,S$GLB,, | Performed by: OBSTETRICS & GYNECOLOGY

## 2021-10-20 PROCEDURE — 3077F SYST BP >= 140 MM HG: CPT | Mod: CPTII,S$GLB,, | Performed by: OBSTETRICS & GYNECOLOGY

## 2021-10-20 PROCEDURE — 3008F BODY MASS INDEX DOCD: CPT | Mod: CPTII,S$GLB,, | Performed by: OBSTETRICS & GYNECOLOGY

## 2021-10-20 PROCEDURE — 3078F PR MOST RECENT DIASTOLIC BLOOD PRESSURE < 80 MM HG: ICD-10-PCS | Mod: CPTII,S$GLB,, | Performed by: OBSTETRICS & GYNECOLOGY

## 2021-10-20 PROCEDURE — U0002 COVID-19 LAB TEST NON-CDC: HCPCS | Performed by: ANESTHESIOLOGY

## 2021-10-20 PROCEDURE — 3078F DIAST BP <80 MM HG: CPT | Mod: CPTII,S$GLB,, | Performed by: OBSTETRICS & GYNECOLOGY

## 2021-10-20 PROCEDURE — 3066F NEPHROPATHY DOC TX: CPT | Mod: CPTII,S$GLB,, | Performed by: OBSTETRICS & GYNECOLOGY

## 2021-10-20 PROCEDURE — 99999 PR PBB SHADOW E&M-EST. PATIENT-LVL III: CPT | Mod: PBBFAC,,, | Performed by: OBSTETRICS & GYNECOLOGY

## 2021-10-20 RX ORDER — OXYCODONE AND ACETAMINOPHEN 5; 325 MG/1; MG/1
1 TABLET ORAL EVERY 4 HOURS PRN
COMMUNITY
End: 2021-11-19

## 2021-10-20 RX ORDER — POLYETHYLENE GLYCOL 3350 17 G/17G
POWDER, FOR SOLUTION ORAL
COMMUNITY
End: 2022-01-13

## 2021-10-20 RX ORDER — SODIUM CHLORIDE, SODIUM LACTATE, POTASSIUM CHLORIDE, CALCIUM CHLORIDE 600; 310; 30; 20 MG/100ML; MG/100ML; MG/100ML; MG/100ML
INJECTION, SOLUTION INTRAVENOUS CONTINUOUS
Status: CANCELLED | OUTPATIENT
Start: 2021-10-20

## 2021-10-22 ENCOUNTER — ANESTHESIA (OUTPATIENT)
Dept: SURGERY | Facility: OTHER | Age: 56
End: 2021-10-22
Payer: COMMERCIAL

## 2021-10-22 ENCOUNTER — HOSPITAL ENCOUNTER (OUTPATIENT)
Facility: OTHER | Age: 56
Discharge: HOME | End: 2021-10-22
Attending: OBSTETRICS & GYNECOLOGY | Admitting: OBSTETRICS & GYNECOLOGY
Payer: COMMERCIAL

## 2021-10-22 DIAGNOSIS — N89.7 HEMATOCOLPOS: Primary | ICD-10-CM

## 2021-10-22 DIAGNOSIS — Z01.818 PRE-OP TESTING: ICD-10-CM

## 2021-10-22 DIAGNOSIS — N88.8 MASS OF UTERINE CERVIX: ICD-10-CM

## 2021-10-22 LAB
ABO + RH BLD: NORMAL
BLD GP AB SCN CELLS X3 SERPL QL: NORMAL
POCT GLUCOSE: 98 MG/DL (ref 70–110)

## 2021-10-22 PROCEDURE — 37000009 HC ANESTHESIA EA ADD 15 MINS: Performed by: OBSTETRICS & GYNECOLOGY

## 2021-10-22 PROCEDURE — 36000704 HC OR TIME LEV I 1ST 15 MIN: Performed by: OBSTETRICS & GYNECOLOGY

## 2021-10-22 PROCEDURE — 63600175 PHARM REV CODE 636 W HCPCS: Performed by: NURSE ANESTHETIST, CERTIFIED REGISTERED

## 2021-10-22 PROCEDURE — 36415 COLL VENOUS BLD VENIPUNCTURE: CPT | Performed by: OBSTETRICS & GYNECOLOGY

## 2021-10-22 PROCEDURE — 63600175 PHARM REV CODE 636 W HCPCS: Performed by: ANESTHESIOLOGY

## 2021-10-22 PROCEDURE — 25000003 PHARM REV CODE 250: Performed by: ANESTHESIOLOGY

## 2021-10-22 PROCEDURE — 57800 PR DILATION OF CERVICAL CANAL: ICD-10-PCS | Mod: ,,, | Performed by: OBSTETRICS & GYNECOLOGY

## 2021-10-22 PROCEDURE — 71000016 HC POSTOP RECOV ADDL HR: Performed by: OBSTETRICS & GYNECOLOGY

## 2021-10-22 PROCEDURE — 71000033 HC RECOVERY, INTIAL HOUR: Performed by: OBSTETRICS & GYNECOLOGY

## 2021-10-22 PROCEDURE — 36000705 HC OR TIME LEV I EA ADD 15 MIN: Performed by: OBSTETRICS & GYNECOLOGY

## 2021-10-22 PROCEDURE — 57800 DILATION OF CERVICAL CANAL: CPT | Mod: ,,, | Performed by: OBSTETRICS & GYNECOLOGY

## 2021-10-22 PROCEDURE — 37000008 HC ANESTHESIA 1ST 15 MINUTES: Performed by: OBSTETRICS & GYNECOLOGY

## 2021-10-22 PROCEDURE — 25000003 PHARM REV CODE 250: Performed by: NURSE ANESTHETIST, CERTIFIED REGISTERED

## 2021-10-22 PROCEDURE — 71000015 HC POSTOP RECOV 1ST HR: Performed by: OBSTETRICS & GYNECOLOGY

## 2021-10-22 PROCEDURE — 63600175 PHARM REV CODE 636 W HCPCS: Performed by: OBSTETRICS & GYNECOLOGY

## 2021-10-22 PROCEDURE — 86900 BLOOD TYPING SEROLOGIC ABO: CPT | Performed by: OBSTETRICS & GYNECOLOGY

## 2021-10-22 RX ORDER — FENTANYL CITRATE 50 UG/ML
INJECTION, SOLUTION INTRAMUSCULAR; INTRAVENOUS
Status: DISCONTINUED | OUTPATIENT
Start: 2021-10-22 | End: 2021-10-22

## 2021-10-22 RX ORDER — LIDOCAINE HCL/PF 100 MG/5ML
SYRINGE (ML) INTRAVENOUS
Status: DISCONTINUED | OUTPATIENT
Start: 2021-10-22 | End: 2021-10-22

## 2021-10-22 RX ORDER — LIDOCAINE HYDROCHLORIDE 10 MG/ML
1 INJECTION, SOLUTION EPIDURAL; INFILTRATION; INTRACAUDAL; PERINEURAL ONCE
Status: DISCONTINUED | OUTPATIENT
Start: 2021-10-22 | End: 2021-10-22 | Stop reason: HOSPADM

## 2021-10-22 RX ORDER — PROCHLORPERAZINE EDISYLATE 5 MG/ML
5 INJECTION INTRAMUSCULAR; INTRAVENOUS EVERY 30 MIN PRN
Status: DISCONTINUED | OUTPATIENT
Start: 2021-10-22 | End: 2021-10-22 | Stop reason: HOSPADM

## 2021-10-22 RX ORDER — OXYCODONE HYDROCHLORIDE 5 MG/1
5 TABLET ORAL
Status: DISCONTINUED | OUTPATIENT
Start: 2021-10-22 | End: 2021-10-22 | Stop reason: HOSPADM

## 2021-10-22 RX ORDER — DEXAMETHASONE SODIUM PHOSPHATE 4 MG/ML
INJECTION, SOLUTION INTRA-ARTICULAR; INTRALESIONAL; INTRAMUSCULAR; INTRAVENOUS; SOFT TISSUE
Status: DISCONTINUED | OUTPATIENT
Start: 2021-10-22 | End: 2021-10-22

## 2021-10-22 RX ORDER — CEFAZOLIN SODIUM 1 G/3ML
2 INJECTION, POWDER, FOR SOLUTION INTRAMUSCULAR; INTRAVENOUS
Status: COMPLETED | OUTPATIENT
Start: 2021-10-22 | End: 2021-10-22

## 2021-10-22 RX ORDER — PHENYLEPHRINE HYDROCHLORIDE 10 MG/ML
INJECTION INTRAVENOUS
Status: DISCONTINUED | OUTPATIENT
Start: 2021-10-22 | End: 2021-10-22

## 2021-10-22 RX ORDER — PROPOFOL 10 MG/ML
VIAL (ML) INTRAVENOUS
Status: DISCONTINUED | OUTPATIENT
Start: 2021-10-22 | End: 2021-10-22

## 2021-10-22 RX ORDER — MIDAZOLAM HYDROCHLORIDE 1 MG/ML
INJECTION INTRAMUSCULAR; INTRAVENOUS
Status: DISCONTINUED | OUTPATIENT
Start: 2021-10-22 | End: 2021-10-22

## 2021-10-22 RX ORDER — ONDANSETRON HYDROCHLORIDE 2 MG/ML
INJECTION, SOLUTION INTRAMUSCULAR; INTRAVENOUS
Status: DISCONTINUED | OUTPATIENT
Start: 2021-10-22 | End: 2021-10-22

## 2021-10-22 RX ORDER — SODIUM CHLORIDE, SODIUM LACTATE, POTASSIUM CHLORIDE, CALCIUM CHLORIDE 600; 310; 30; 20 MG/100ML; MG/100ML; MG/100ML; MG/100ML
INJECTION, SOLUTION INTRAVENOUS CONTINUOUS
Status: DISCONTINUED | OUTPATIENT
Start: 2021-10-22 | End: 2021-10-22 | Stop reason: HOSPADM

## 2021-10-22 RX ORDER — MEPERIDINE HYDROCHLORIDE 25 MG/ML
12.5 INJECTION INTRAMUSCULAR; INTRAVENOUS; SUBCUTANEOUS ONCE AS NEEDED
Status: DISCONTINUED | OUTPATIENT
Start: 2021-10-22 | End: 2021-10-22 | Stop reason: HOSPADM

## 2021-10-22 RX ORDER — HYDROMORPHONE HYDROCHLORIDE 2 MG/ML
0.4 INJECTION, SOLUTION INTRAMUSCULAR; INTRAVENOUS; SUBCUTANEOUS EVERY 5 MIN PRN
Status: DISCONTINUED | OUTPATIENT
Start: 2021-10-22 | End: 2021-10-22 | Stop reason: HOSPADM

## 2021-10-22 RX ORDER — SODIUM CHLORIDE 0.9 % (FLUSH) 0.9 %
3 SYRINGE (ML) INJECTION
Status: DISCONTINUED | OUTPATIENT
Start: 2021-10-22 | End: 2021-10-22 | Stop reason: HOSPADM

## 2021-10-22 RX ADMIN — PROPOFOL 200 MG: 10 INJECTION, EMULSION INTRAVENOUS at 08:10

## 2021-10-22 RX ADMIN — GLYCOPYRROLATE 0.2 MG: 0.2 INJECTION, SOLUTION INTRAMUSCULAR; INTRAVITREAL at 08:10

## 2021-10-22 RX ADMIN — CEFAZOLIN 2 G: 330 INJECTION, POWDER, FOR SOLUTION INTRAMUSCULAR; INTRAVENOUS at 08:10

## 2021-10-22 RX ADMIN — DEXAMETHASONE SODIUM PHOSPHATE 8 MG: 4 INJECTION, SOLUTION INTRAMUSCULAR; INTRAVENOUS at 08:10

## 2021-10-22 RX ADMIN — ONDANSETRON 4 MG: 2 INJECTION, SOLUTION INTRAMUSCULAR; INTRAVENOUS at 08:10

## 2021-10-22 RX ADMIN — OXYCODONE 5 MG: 5 TABLET ORAL at 09:10

## 2021-10-22 RX ADMIN — LIDOCAINE HYDROCHLORIDE 60 MG: 20 INJECTION, SOLUTION INTRAVENOUS at 08:10

## 2021-10-22 RX ADMIN — FENTANYL CITRATE 100 MCG: 50 INJECTION, SOLUTION INTRAMUSCULAR; INTRAVENOUS at 08:10

## 2021-10-22 RX ADMIN — PHENYLEPHRINE HYDROCHLORIDE 100 MCG: 10 INJECTION INTRAVENOUS at 08:10

## 2021-10-22 RX ADMIN — MIDAZOLAM HYDROCHLORIDE 2 MG: 1 INJECTION, SOLUTION INTRAMUSCULAR; INTRAVENOUS at 08:10

## 2021-10-22 RX ADMIN — SODIUM CHLORIDE, SODIUM LACTATE, POTASSIUM CHLORIDE, AND CALCIUM CHLORIDE: 600; 310; 30; 20 INJECTION, SOLUTION INTRAVENOUS at 07:10

## 2021-10-25 ENCOUNTER — PATIENT MESSAGE (OUTPATIENT)
Dept: GYNECOLOGIC ONCOLOGY | Facility: CLINIC | Age: 56
End: 2021-10-25
Payer: COMMERCIAL

## 2021-10-25 ENCOUNTER — TELEPHONE (OUTPATIENT)
Dept: GYNECOLOGIC ONCOLOGY | Facility: CLINIC | Age: 56
End: 2021-10-25
Payer: COMMERCIAL

## 2021-10-26 VITALS
BODY MASS INDEX: 36.7 KG/M2 | HEIGHT: 64 IN | SYSTOLIC BLOOD PRESSURE: 135 MMHG | RESPIRATION RATE: 16 BRPM | DIASTOLIC BLOOD PRESSURE: 71 MMHG | OXYGEN SATURATION: 95 % | HEART RATE: 73 BPM | WEIGHT: 215 LBS | TEMPERATURE: 98 F

## 2021-11-12 ENCOUNTER — PATIENT MESSAGE (OUTPATIENT)
Dept: GYNECOLOGIC ONCOLOGY | Facility: CLINIC | Age: 56
End: 2021-11-12
Payer: COMMERCIAL

## 2021-11-19 ENCOUNTER — OFFICE VISIT (OUTPATIENT)
Dept: GYNECOLOGIC ONCOLOGY | Facility: CLINIC | Age: 56
End: 2021-11-19
Payer: COMMERCIAL

## 2021-11-19 VITALS
SYSTOLIC BLOOD PRESSURE: 148 MMHG | HEART RATE: 67 BPM | WEIGHT: 213.88 LBS | DIASTOLIC BLOOD PRESSURE: 66 MMHG | BODY MASS INDEX: 36.71 KG/M2

## 2021-11-19 DIAGNOSIS — N89.7 HEMATOCOLPOS: Primary | ICD-10-CM

## 2021-11-19 PROCEDURE — 3077F SYST BP >= 140 MM HG: CPT | Mod: CPTII,S$GLB,, | Performed by: OBSTETRICS & GYNECOLOGY

## 2021-11-19 PROCEDURE — 3077F PR MOST RECENT SYSTOLIC BLOOD PRESSURE >= 140 MM HG: ICD-10-PCS | Mod: CPTII,S$GLB,, | Performed by: OBSTETRICS & GYNECOLOGY

## 2021-11-19 PROCEDURE — 3008F PR BODY MASS INDEX (BMI) DOCUMENTED: ICD-10-PCS | Mod: CPTII,S$GLB,, | Performed by: OBSTETRICS & GYNECOLOGY

## 2021-11-19 PROCEDURE — 3008F BODY MASS INDEX DOCD: CPT | Mod: CPTII,S$GLB,, | Performed by: OBSTETRICS & GYNECOLOGY

## 2021-11-19 PROCEDURE — 1159F PR MEDICATION LIST DOCUMENTED IN MEDICAL RECORD: ICD-10-PCS | Mod: CPTII,S$GLB,, | Performed by: OBSTETRICS & GYNECOLOGY

## 2021-11-19 PROCEDURE — 3066F NEPHROPATHY DOC TX: CPT | Mod: CPTII,S$GLB,, | Performed by: OBSTETRICS & GYNECOLOGY

## 2021-11-19 PROCEDURE — 1160F PR REVIEW ALL MEDS BY PRESCRIBER/CLIN PHARMACIST DOCUMENTED: ICD-10-PCS | Mod: CPTII,S$GLB,, | Performed by: OBSTETRICS & GYNECOLOGY

## 2021-11-19 PROCEDURE — 99999 PR PBB SHADOW E&M-EST. PATIENT-LVL III: ICD-10-PCS | Mod: PBBFAC,,, | Performed by: OBSTETRICS & GYNECOLOGY

## 2021-11-19 PROCEDURE — 3066F PR DOCUMENTATION OF TREATMENT FOR NEPHROPATHY: ICD-10-PCS | Mod: CPTII,S$GLB,, | Performed by: OBSTETRICS & GYNECOLOGY

## 2021-11-19 PROCEDURE — 99024 POSTOP FOLLOW-UP VISIT: CPT | Mod: S$GLB,,, | Performed by: OBSTETRICS & GYNECOLOGY

## 2021-11-19 PROCEDURE — 3078F PR MOST RECENT DIASTOLIC BLOOD PRESSURE < 80 MM HG: ICD-10-PCS | Mod: CPTII,S$GLB,, | Performed by: OBSTETRICS & GYNECOLOGY

## 2021-11-19 PROCEDURE — 1159F MED LIST DOCD IN RCRD: CPT | Mod: CPTII,S$GLB,, | Performed by: OBSTETRICS & GYNECOLOGY

## 2021-11-19 PROCEDURE — 3078F DIAST BP <80 MM HG: CPT | Mod: CPTII,S$GLB,, | Performed by: OBSTETRICS & GYNECOLOGY

## 2021-11-19 PROCEDURE — 99024 PR POST-OP FOLLOW-UP VISIT: ICD-10-PCS | Mod: S$GLB,,, | Performed by: OBSTETRICS & GYNECOLOGY

## 2021-11-19 PROCEDURE — 1160F RVW MEDS BY RX/DR IN RCRD: CPT | Mod: CPTII,S$GLB,, | Performed by: OBSTETRICS & GYNECOLOGY

## 2021-11-19 PROCEDURE — 99999 PR PBB SHADOW E&M-EST. PATIENT-LVL III: CPT | Mod: PBBFAC,,, | Performed by: OBSTETRICS & GYNECOLOGY

## 2022-01-13 ENCOUNTER — OFFICE VISIT (OUTPATIENT)
Dept: URGENT CARE | Facility: CLINIC | Age: 57
End: 2022-01-13
Payer: COMMERCIAL

## 2022-01-13 VITALS
TEMPERATURE: 99 F | HEIGHT: 64 IN | WEIGHT: 216 LBS | SYSTOLIC BLOOD PRESSURE: 127 MMHG | RESPIRATION RATE: 18 BRPM | DIASTOLIC BLOOD PRESSURE: 80 MMHG | HEART RATE: 73 BPM | OXYGEN SATURATION: 96 % | BODY MASS INDEX: 36.88 KG/M2

## 2022-01-13 DIAGNOSIS — R42 DIZZINESS: ICD-10-CM

## 2022-01-13 DIAGNOSIS — R42 VERTIGO: ICD-10-CM

## 2022-01-13 DIAGNOSIS — H92.03 OTALGIA OF BOTH EARS: Primary | ICD-10-CM

## 2022-01-13 LAB
CTP QC/QA: YES
SARS-COV-2 RDRP RESP QL NAA+PROBE: NEGATIVE

## 2022-01-13 PROCEDURE — 3008F PR BODY MASS INDEX (BMI) DOCUMENTED: ICD-10-PCS | Mod: CPTII,S$GLB,, | Performed by: FAMILY MEDICINE

## 2022-01-13 PROCEDURE — 3074F PR MOST RECENT SYSTOLIC BLOOD PRESSURE < 130 MM HG: ICD-10-PCS | Mod: CPTII,S$GLB,, | Performed by: FAMILY MEDICINE

## 2022-01-13 PROCEDURE — 99214 PR OFFICE/OUTPT VISIT, EST, LEVL IV, 30-39 MIN: ICD-10-PCS | Mod: S$GLB,,, | Performed by: FAMILY MEDICINE

## 2022-01-13 PROCEDURE — 3079F DIAST BP 80-89 MM HG: CPT | Mod: CPTII,S$GLB,, | Performed by: FAMILY MEDICINE

## 2022-01-13 PROCEDURE — 1160F PR REVIEW ALL MEDS BY PRESCRIBER/CLIN PHARMACIST DOCUMENTED: ICD-10-PCS | Mod: CPTII,S$GLB,, | Performed by: FAMILY MEDICINE

## 2022-01-13 PROCEDURE — 1160F RVW MEDS BY RX/DR IN RCRD: CPT | Mod: CPTII,S$GLB,, | Performed by: FAMILY MEDICINE

## 2022-01-13 PROCEDURE — 1159F PR MEDICATION LIST DOCUMENTED IN MEDICAL RECORD: ICD-10-PCS | Mod: CPTII,S$GLB,, | Performed by: FAMILY MEDICINE

## 2022-01-13 PROCEDURE — U0002 COVID-19 LAB TEST NON-CDC: HCPCS | Mod: QW,S$GLB,, | Performed by: FAMILY MEDICINE

## 2022-01-13 PROCEDURE — 1159F MED LIST DOCD IN RCRD: CPT | Mod: CPTII,S$GLB,, | Performed by: FAMILY MEDICINE

## 2022-01-13 PROCEDURE — U0002: ICD-10-PCS | Mod: QW,S$GLB,, | Performed by: FAMILY MEDICINE

## 2022-01-13 PROCEDURE — 99214 OFFICE O/P EST MOD 30 MIN: CPT | Mod: S$GLB,,, | Performed by: FAMILY MEDICINE

## 2022-01-13 PROCEDURE — 3074F SYST BP LT 130 MM HG: CPT | Mod: CPTII,S$GLB,, | Performed by: FAMILY MEDICINE

## 2022-01-13 PROCEDURE — 3008F BODY MASS INDEX DOCD: CPT | Mod: CPTII,S$GLB,, | Performed by: FAMILY MEDICINE

## 2022-01-13 PROCEDURE — 3079F PR MOST RECENT DIASTOLIC BLOOD PRESSURE 80-89 MM HG: ICD-10-PCS | Mod: CPTII,S$GLB,, | Performed by: FAMILY MEDICINE

## 2022-01-13 RX ORDER — MECLIZINE HYDROCHLORIDE 25 MG/1
25 TABLET ORAL 3 TIMES DAILY PRN
Qty: 20 TABLET | Refills: 0 | Status: SHIPPED | OUTPATIENT
Start: 2022-01-13 | End: 2022-04-04

## 2022-01-13 NOTE — PATIENT INSTRUCTIONS
Patient Education       Dizziness, Adult ED   General Information   You came to the Emergency Department (ED) for dizziness. Dizziness means different things to different people. For example, you may feel light-headed or like you are about to pass out. You may have trouble walking straight and feel like you are about to fall when you are dizzy. Some people feel as though they are spinning or the world around them is spinning. This spinning feeling is known as vertigo.  Many things can cause you to feel dizzy. Some are serious things like heart problems or a stroke. Less serious things, like getting up too quickly or not drinking enough fluids, can also cause you to feel dizzy. Some medicines can also cause you to feel dizzy.  After seeing you, the doctor feels that the risk of a serious cause for your dizziness right now is low. If the doctor thinks you have vertigo, you may be given medicine to help with your symptoms.  What care is needed at home?   · Call your regular doctor to let them know you were in the ED. Make a follow-up appointment if you were told to.  · Avoid changing your position too quickly. Take care when moving from sitting to standing.  · Sit on the edge of the bed for a few minutes before you stand up. Start to walk slowly after you stand up.  · Move your legs often if you need to sit or  one position for a long time.  · Be sure to drink enough fluids, even if you do not feel thirsty.  · Sit or lie down right away if you feel faint or dizzy. Take extra care to protect yourself from falls. Avoid driving when dizzy. If you feel dizzy while driving, pull over right away.  · If you must walk somewhere when you feel dizzy, you may need to use a cane or walker, or have another person help you so you dont fall.  When do I need to get emergency help?   · Call for an ambulance right away if:  ? You have new weakness in your arms or legs.  ? You develop new trouble speaking, swallowing, seeing, or  hearing.  ? You have chest pain, an irregular heartbeat, or trouble breathing.  ? You have a seizure.  ? You become unable to walk or stand because of your dizziness.  When do I need to call the doctor?   · Your dizziness continues for a long time or gets worse.  · You have new or worsening symptoms.  Last Reviewed Date   2020-09-16  Consumer Information Use and Disclaimer   This information is not specific medical advice and does not replace information you receive from your health care provider. This is only a brief summary of general information. It does NOT include all information about conditions, illnesses, injuries, tests, procedures, treatments, therapies, discharge instructions or life-style choices that may apply to you. You must talk with your health care provider for complete information about your health and treatment options. This information should not be used to decide whether or not to accept your health care providers advice, instructions or recommendations. Only your health care provider has the knowledge and training to provide advice that is right for you.  Copyright   Copyright © 2021 [employer] and its affiliates and/or licensors. All rights reserved.  Patient Education       Vertigo (a Type of Dizziness) Discharge Instructions   About this topic   Vertigo is a kind of dizziness. It can make you feel like you are spinning, swaying, or tilting. You may also feel like the room is moving around you. You may feel light-headed or have trouble walking straight when you are dizzy. Vertigo can come and go. It might only last a little while or it might last for days.  Vertigo can be caused by many different things, including inner ear problems, infection, migraine, certain medicines, injury, and stroke.     What care is needed at home?   · Ask your doctor what you need to do when you go home. Make sure you ask questions if you do not understand what the doctor says.  · Take extra care to protect  yourself from falls.  · Sit or lie down right away if you feel faint or dizzy.  · Avoid driving if you feel dizzy. If you start to feel dizzy while driving, pull over right away.  · You may need to use a cane or walker to help you walk without falling while you are dizzy.  · Take extra care when you change positions.  · Avoid changing your position too quickly. Go slowly when moving from sitting to standing.  · After you wake up, sit on the edge of the bed for a few minutes before you stand up. Start to walk slowly after you stand up.  · Move your legs often if you need to sit or  one position for a long time.  · If you were given exercises to help with your vertigo, follow instructions for how and when to do them.  What follow-up care is needed?   You may need to see your doctor for help with an upset stomach or to give you more fluids if you keep throwing up. Your doctor may ask you to make visits to the office to check on your progress. Be sure to keep these visits.  What drugs may be needed?   The doctor may order drugs to:  · Treat allergies  · Treat infection  · Stop pain  · Block certain messages between nerves  · Calm you and help you relax  · Treat upset stomach or throwing up  Will physical activity be limited?   Do not drive or work with heavy or dangerous machines until your signs of illness are gone. Climbing can also be risky and should be avoided.  What problems could happen?   · You may have fluid loss due to lots of throwing up.  · You may fall when you lose balance. You could get hurt or break a bone.  When do I need to call the doctor?   · You have signs of stroke like sudden:  ? Numbness or weakness of the face, arm, or leg, especially on one side of the body.  ? Confusion, trouble speaking or understanding.  ? Trouble seeing in one or both eyes.  ? Trouble walking, dizziness, loss of balance or coordination.  ? Severe headache with no known cause.  · You pass out.  · Your vertigo  episodes are lasting longer or coming more frequently than they used to.  · You are not able to walk or stand because of your vertigo.  · You continue to throw up.  Teach Back: Helping You Understand   The Teach Back Method helps you understand the information we are giving you. After you talk with the staff, tell them in your own words what you learned. This helps to make sure the staff has described each thing clearly. It also helps to explain things that may have been confusing. Before going home, make sure you can do these:  · I can tell you about my condition.  · I can tell you what I will do to help me stay safe when moving about.  · I can tell you what I will do if I have problems talking or moving.  Where can I learn more?   [facility]  http://www.health.[place]/your-health/conditions-and-treatments/diseases-and-illnesses/dizziness   [facility] Cho[facility]  http://www.nhs.uk/conditions/dizziness/pages/introduction.aspx   [facility] Choices  http://www.nhs.uk/conditions/vertigo/pages/introduction.aspx   Last Reviewed Date   2021-06-08  Consumer Information Use and Disclaimer   This information is not specific medical advice and does not replace information you receive from your health care provider. This is only a brief summary of general information. It does NOT include all information about conditions, illnesses, injuries, tests, procedures, treatments, therapies, discharge instructions or life-style choices that may apply to you. You must talk with your health care provider for complete information about your health and treatment options. This information should not be used to decide whether or not to accept your health care providers advice, instructions or recommendations. Only your health care provider has the knowledge and training to provide advice that is right for you.  Copyright   Copyright © 2021 [employer] and its affiliates and/or licensors. All rights reserved.

## 2022-01-13 NOTE — PROGRESS NOTES
"Subjective:       Patient ID: Jacqui Leonard is a 56 y.o. female.    Vitals:  height is 5' 4" (1.626 m) and weight is 98 kg (216 lb). Her oral temperature is 98.5 °F (36.9 °C). Her blood pressure is 127/80 and her pulse is 73. Her respiration is 18 and oxygen saturation is 96%.     Chief Complaint: Otalgia (AS)    55 y/o F presents with c/o ear pain and dizziness starting this am. States the dizziness has resolved now. Pt reports hx of vertigo.  Deneis HERNANDEZ, weakness, N/V,  CP, SOB, cough, sore throat, fever,  diarrhea, abdominal pain, dysuria, loss of smell or taste.  Denies known COVID exposure. Pt is unvaccinated.       Otalgia   There is pain in the left ear. The current episode started today. The problem occurs constantly. Pertinent negatives include no abdominal pain, coughing, diarrhea, ear discharge, headaches, hearing loss, neck pain, rash, rhinorrhea, sore throat or vomiting. Associated symptoms comments: "feeling weird, can't concentrate, off balanced, can't put thought together"  Dizziness / hx of vertigo . She has tried nothing for the symptoms. There is no history of a chronic ear infection, hearing loss or a tympanostomy tube.       HENT: Positive for ear pain. Negative for ear discharge, hearing loss and sore throat.    Neck: Negative for neck pain.   Respiratory: Negative for cough.    Gastrointestinal: Negative for abdominal pain, vomiting and diarrhea.   Skin: Negative for rash.   Neurological: Negative for headaches.       Objective:      Physical Exam   Constitutional: She is oriented to person, place, and time. She appears well-developed and well-nourished. She is cooperative.  Non-toxic appearance. She does not appear ill. No distress.   HENT:   Head: Normocephalic and atraumatic.   Ears:   Right Ear: Hearing, tympanic membrane, external ear and ear canal normal. impacted cerumen  Left Ear: Hearing, tympanic membrane, external ear and ear canal normal. impacted cerumen  Nose: Nose normal. " No mucosal edema, rhinorrhea, nasal deformity or congestion. No epistaxis. Right sinus exhibits no maxillary sinus tenderness and no frontal sinus tenderness. Left sinus exhibits no maxillary sinus tenderness and no frontal sinus tenderness.   Mouth/Throat: Uvula is midline, oropharynx is clear and moist and mucous membranes are normal. No trismus in the jaw. Normal dentition. No uvula swelling. No oropharyngeal exudate or posterior oropharyngeal erythema.   Eyes: Conjunctivae and lids are normal. Right eye exhibits no discharge. Left eye exhibits no discharge. No scleral icterus.   Neck: Trachea normal and phonation normal. Neck supple.   Cardiovascular: Normal rate, regular rhythm, normal heart sounds, intact distal pulses and normal pulses.   No murmur heard.  Pulmonary/Chest: Effort normal and breath sounds normal. No stridor. No respiratory distress. She has no wheezes. She has no rhonchi. She has no rales.   Abdominal: Normal appearance and bowel sounds are normal. She exhibits no distension and no mass. Soft. There is no abdominal tenderness.   Musculoskeletal: Normal range of motion.         General: No deformity or edema. Normal range of motion.      Cervical back: She exhibits no tenderness.   Lymphadenopathy:     She has no cervical adenopathy.   Neurological: no focal deficit. She is alert, oriented to person, place, and time and at baseline. She displays no weakness. No cranial nerve deficit or sensory deficit. She exhibits normal muscle tone. Coordination normal.   Skin: Skin is warm, dry, intact, not diaphoretic and not pale.   Psychiatric: She has a normal mood and affect. Her speech is normal and behavior is normal. Mood, judgment and thought content normal.   Nursing note and vitals reviewed.        Assessment:       1. Otalgia of both ears    2. Dizziness    3. Vertigo          Plan:         Otalgia of both ears    Dizziness  -     POCT COVID-19 Rapid Screening    Vertigo    Other orders  -      meclizine (ANTIVERT) 25 mg tablet; Take 1 tablet (25 mg total) by mouth 3 (three) times daily as needed for Dizziness or Nausea.  Dispense: 20 tablet; Refill: 0        Patient Education       Dizziness, Adult ED   General Information   You came to the Emergency Department (ED) for dizziness. Dizziness means different things to different people. For example, you may feel light-headed or like you are about to pass out. You may have trouble walking straight and feel like you are about to fall when you are dizzy. Some people feel as though they are spinning or the world around them is spinning. This spinning feeling is known as vertigo.  Many things can cause you to feel dizzy. Some are serious things like heart problems or a stroke. Less serious things, like getting up too quickly or not drinking enough fluids, can also cause you to feel dizzy. Some medicines can also cause you to feel dizzy.  After seeing you, the doctor feels that the risk of a serious cause for your dizziness right now is low. If the doctor thinks you have vertigo, you may be given medicine to help with your symptoms.  What care is needed at home?   · Call your regular doctor to let them know you were in the ED. Make a follow-up appointment if you were told to.  · Avoid changing your position too quickly. Take care when moving from sitting to standing.  · Sit on the edge of the bed for a few minutes before you stand up. Start to walk slowly after you stand up.  · Move your legs often if you need to sit or  one position for a long time.  · Be sure to drink enough fluids, even if you do not feel thirsty.  · Sit or lie down right away if you feel faint or dizzy. Take extra care to protect yourself from falls. Avoid driving when dizzy. If you feel dizzy while driving, pull over right away.  · If you must walk somewhere when you feel dizzy, you may need to use a cane or walker, or have another person help you so you dont fall.  When do I need to  get emergency help?   · Call for an ambulance right away if:  ? You have new weakness in your arms or legs.  ? You develop new trouble speaking, swallowing, seeing, or hearing.  ? You have chest pain, an irregular heartbeat, or trouble breathing.  ? You have a seizure.  ? You become unable to walk or stand because of your dizziness.  When do I need to call the doctor?   · Your dizziness continues for a long time or gets worse.  · You have new or worsening symptoms.  Last Reviewed Date   2020-09-16  Consumer Information Use and Disclaimer   This information is not specific medical advice and does not replace information you receive from your health care provider. This is only a brief summary of general information. It does NOT include all information about conditions, illnesses, injuries, tests, procedures, treatments, therapies, discharge instructions or life-style choices that may apply to you. You must talk with your health care provider for complete information about your health and treatment options. This information should not be used to decide whether or not to accept your health care providers advice, instructions or recommendations. Only your health care provider has the knowledge and training to provide advice that is right for you.  Copyright   Copyright © 2021 [employer] and its affiliates and/or licensors. All rights reserved.  Patient Education       Vertigo (a Type of Dizziness) Discharge Instructions   About this topic   Vertigo is a kind of dizziness. It can make you feel like you are spinning, swaying, or tilting. You may also feel like the room is moving around you. You may feel light-headed or have trouble walking straight when you are dizzy. Vertigo can come and go. It might only last a little while or it might last for days.  Vertigo can be caused by many different things, including inner ear problems, infection, migraine, certain medicines, injury, and stroke.     What care is needed at  home?   · Ask your doctor what you need to do when you go home. Make sure you ask questions if you do not understand what the doctor says.  · Take extra care to protect yourself from falls.  · Sit or lie down right away if you feel faint or dizzy.  · Avoid driving if you feel dizzy. If you start to feel dizzy while driving, pull over right away.  · You may need to use a cane or walker to help you walk without falling while you are dizzy.  · Take extra care when you change positions.  · Avoid changing your position too quickly. Go slowly when moving from sitting to standing.  · After you wake up, sit on the edge of the bed for a few minutes before you stand up. Start to walk slowly after you stand up.  · Move your legs often if you need to sit or  one position for a long time.  · If you were given exercises to help with your vertigo, follow instructions for how and when to do them.  What follow-up care is needed?   You may need to see your doctor for help with an upset stomach or to give you more fluids if you keep throwing up. Your doctor may ask you to make visits to the office to check on your progress. Be sure to keep these visits.  What drugs may be needed?   The doctor may order drugs to:  · Treat allergies  · Treat infection  · Stop pain  · Block certain messages between nerves  · Calm you and help you relax  · Treat upset stomach or throwing up  Will physical activity be limited?   Do not drive or work with heavy or dangerous machines until your signs of illness are gone. Climbing can also be risky and should be avoided.  What problems could happen?   · You may have fluid loss due to lots of throwing up.  · You may fall when you lose balance. You could get hurt or break a bone.  When do I need to call the doctor?   · You have signs of stroke like sudden:  ? Numbness or weakness of the face, arm, or leg, especially on one side of the body.  ? Confusion, trouble speaking or understanding.  ? Trouble  seeing in one or both eyes.  ? Trouble walking, dizziness, loss of balance or coordination.  ? Severe headache with no known cause.  · You pass out.  · Your vertigo episodes are lasting longer or coming more frequently than they used to.  · You are not able to walk or stand because of your vertigo.  · You continue to throw up.  Teach Back: Helping You Understand   The Teach Back Method helps you understand the information we are giving you. After you talk with the staff, tell them in your own words what you learned. This helps to make sure the staff has described each thing clearly. It also helps to explain things that may have been confusing. Before going home, make sure you can do these:  · I can tell you about my condition.  · I can tell you what I will do to help me stay safe when moving about.  · I can tell you what I will do if I have problems talking or moving.  Where can I learn more?   [facility]  http://www.health.[place]/your-health/conditions-and-treatments/diseases-and-illnesses/dizziness   [facility] Choices  http://www.nhs.uk/conditions/dizziness/pages/introduction.aspx   [facility] Choices  http://www.nhs.uk/conditions/vertigo/pages/introduction.aspx   Last Reviewed Date   2021-06-08  Consumer Information Use and Disclaimer   This information is not specific medical advice and does not replace information you receive from your health care provider. This is only a brief summary of general information. It does NOT include all information about conditions, illnesses, injuries, tests, procedures, treatments, therapies, discharge instructions or life-style choices that may apply to you. You must talk with your health care provider for complete information about your health and treatment options. This information should not be used to decide whether or not to accept your health care providers advice, instructions or recommendations. Only your health care provider has the knowledge and training to provide advice  that is right for you.  Copyright   Copyright © 2021 [employer] and its affiliates and/or licensors. All rights reserved.

## 2022-03-14 ENCOUNTER — TELEPHONE (OUTPATIENT)
Dept: NEPHROLOGY | Facility: CLINIC | Age: 57
End: 2022-03-14
Payer: COMMERCIAL

## 2022-03-14 ENCOUNTER — PATIENT MESSAGE (OUTPATIENT)
Dept: NEPHROLOGY | Facility: CLINIC | Age: 57
End: 2022-03-14
Payer: COMMERCIAL

## 2022-03-14 NOTE — TELEPHONE ENCOUNTER
----- Message from Aria Dumont sent at 3/14/2022  4:08 PM CDT -----  Regarding: pt called  Name of Who is Calling: ALCIRA AGUILAR [[id number]]      What is the request in detail: pt is requesting blood work orders to be put in the system asap . Please advise       Can the clinic reply by [facility]: YES      What Number to Call Back [facility]: [phone]

## 2022-03-15 ENCOUNTER — LAB VISIT (OUTPATIENT)
Dept: LAB | Facility: HOSPITAL | Age: 57
End: 2022-03-15
Attending: INTERNAL MEDICINE
Payer: COMMERCIAL

## 2022-03-15 ENCOUNTER — TELEPHONE (OUTPATIENT)
Dept: NEPHROLOGY | Facility: CLINIC | Age: 57
End: 2022-03-15
Payer: COMMERCIAL

## 2022-03-15 DIAGNOSIS — N18.30 STAGE 3 CHRONIC KIDNEY DISEASE, UNSPECIFIED WHETHER STAGE 3A OR 3B CKD: ICD-10-CM

## 2022-03-15 LAB
ALBUMIN SERPL BCP-MCNC: 3.3 G/DL (ref 3.5–5.2)
ANION GAP SERPL CALC-SCNC: 12 MMOL/L (ref 8–16)
BUN SERPL-MCNC: 37 MG/DL (ref 6–20)
CALCIUM SERPL-MCNC: 9.6 MG/DL (ref 8.7–10.5)
CHLORIDE SERPL-SCNC: 107 MMOL/L (ref 95–110)
CO2 SERPL-SCNC: 24 MMOL/L (ref 23–29)
CREAT SERPL-MCNC: 2.4 MG/DL (ref 0.5–1.4)
EST. GFR  (AFRICAN AMERICAN): 25.1 ML/MIN/1.73 M^2
EST. GFR  (NON AFRICAN AMERICAN): 21.8 ML/MIN/1.73 M^2
GLUCOSE SERPL-MCNC: 84 MG/DL (ref 70–110)
PHOSPHATE SERPL-MCNC: 3.8 MG/DL (ref 2.7–4.5)
POTASSIUM SERPL-SCNC: 4.1 MMOL/L (ref 3.5–5.1)
PTH-INTACT SERPL-MCNC: 169.7 PG/ML (ref 9–77)
SODIUM SERPL-SCNC: 143 MMOL/L (ref 136–145)

## 2022-03-15 PROCEDURE — 80069 RENAL FUNCTION PANEL: CPT | Performed by: INTERNAL MEDICINE

## 2022-03-15 PROCEDURE — 83970 ASSAY OF PARATHORMONE: CPT | Performed by: INTERNAL MEDICINE

## 2022-03-15 PROCEDURE — 36415 COLL VENOUS BLD VENIPUNCTURE: CPT | Mod: PO | Performed by: INTERNAL MEDICINE

## 2022-03-15 RX ORDER — CALCITRIOL 0.25 UG/1
0.25 CAPSULE ORAL DAILY
Qty: 30 CAPSULE | Refills: 5 | Status: SHIPPED | OUTPATIENT
Start: 2022-03-15 | End: 2022-08-05

## 2022-04-01 ENCOUNTER — PATIENT MESSAGE (OUTPATIENT)
Dept: NEPHROLOGY | Facility: CLINIC | Age: 57
End: 2022-04-01
Payer: COMMERCIAL

## 2022-04-04 ENCOUNTER — PATIENT MESSAGE (OUTPATIENT)
Dept: NEPHROLOGY | Facility: CLINIC | Age: 57
End: 2022-04-04
Payer: COMMERCIAL

## 2022-04-04 ENCOUNTER — OFFICE VISIT (OUTPATIENT)
Dept: NEPHROLOGY | Facility: CLINIC | Age: 57
End: 2022-04-04
Payer: COMMERCIAL

## 2022-04-04 DIAGNOSIS — N18.4 CKD (CHRONIC KIDNEY DISEASE) STAGE 4, GFR 15-29 ML/MIN: Primary | ICD-10-CM

## 2022-04-04 DIAGNOSIS — R80.1 PERSISTENT PROTEINURIA: ICD-10-CM

## 2022-04-04 DIAGNOSIS — N28.1 ACQUIRED CYST OF KIDNEY: ICD-10-CM

## 2022-04-04 DIAGNOSIS — N25.81 SECONDARY RENAL HYPERPARATHYROIDISM: ICD-10-CM

## 2022-04-04 PROCEDURE — 1160F PR REVIEW ALL MEDS BY PRESCRIBER/CLIN PHARMACIST DOCUMENTED: ICD-10-PCS | Mod: CPTII,95,, | Performed by: INTERNAL MEDICINE

## 2022-04-04 PROCEDURE — 3066F NEPHROPATHY DOC TX: CPT | Mod: CPTII,95,, | Performed by: INTERNAL MEDICINE

## 2022-04-04 PROCEDURE — 99214 PR OFFICE/OUTPT VISIT, EST, LEVL IV, 30-39 MIN: ICD-10-PCS | Mod: 95,,, | Performed by: INTERNAL MEDICINE

## 2022-04-04 PROCEDURE — 1159F PR MEDICATION LIST DOCUMENTED IN MEDICAL RECORD: ICD-10-PCS | Mod: CPTII,95,, | Performed by: INTERNAL MEDICINE

## 2022-04-04 PROCEDURE — 3066F PR DOCUMENTATION OF TREATMENT FOR NEPHROPATHY: ICD-10-PCS | Mod: CPTII,95,, | Performed by: INTERNAL MEDICINE

## 2022-04-04 PROCEDURE — 99214 OFFICE O/P EST MOD 30 MIN: CPT | Mod: 95,,, | Performed by: INTERNAL MEDICINE

## 2022-04-04 PROCEDURE — 1159F MED LIST DOCD IN RCRD: CPT | Mod: CPTII,95,, | Performed by: INTERNAL MEDICINE

## 2022-04-04 PROCEDURE — 1160F RVW MEDS BY RX/DR IN RCRD: CPT | Mod: CPTII,95,, | Performed by: INTERNAL MEDICINE

## 2022-04-04 NOTE — PROGRESS NOTES
Subjective:       Patient ID: Jacqui Leonard is a 56 y.o. White female who presents for return patient evaluation for chronic renal failure.    The patient location is:  Patient Home   The chief complaint leading to consultation is: ckd  Visit type: Virtual visit with synchronous audio and video  Total time spent with patient: 18 minutes  Each patient to whom he or she provides medical services by telemedicine is:  (1) informed of the relationship between the physician and patient and the respective role of any other health care provider with respect to management of the patient; and (2) notified that he or she may decline to receive medical services by telemedicine and may withdraw from such care at any time.     She has been lost to follow-up since 6/2021.  She has been having more problems with her back and arthritis.  There have been no recent illnesses, hospitalizations or procedures.  She had a renal biopsy in 2006.  Her blood pressure has been more low lately than usual as well.      Review of Systems   Constitutional: Positive for fatigue. Negative for appetite change, chills and fever.   HENT: Negative for congestion.    Eyes: Negative for visual disturbance.   Respiratory: Positive for shortness of breath (with exertion). Negative for cough.    Cardiovascular: Negative for chest pain and leg swelling.   Gastrointestinal: Negative for nausea.   Genitourinary: Negative for difficulty urinating, dysuria and hematuria.   Musculoskeletal: Positive for arthralgias (L shoulder), back pain and gait problem.   Skin: Negative for rash.   Neurological: Negative for headaches.   Psychiatric/Behavioral: Positive for sleep disturbance.       The past medical, family and social histories were reviewed for this encounter.     There were no vitals taken for this visit.    Objective:      Physical Exam  Vitals reviewed.   Constitutional:       General: She is not in acute distress.     Appearance: She is  well-developed.   HENT:      Head: Normocephalic and atraumatic.   Eyes:      General: No scleral icterus.  Pulmonary:      Effort: Pulmonary effort is normal.   Neurological:      Mental Status: She is alert and oriented to person, place, and time.   Psychiatric:         Mood and Affect: Mood normal.         Behavior: Behavior normal.        Assessment:       1. Stage 3 chronic kidney disease, unspecified whether stage 3a or 3b CKD    2. Persistent proteinuria    3. Acquired cyst of kidney    4. Secondary renal hyperparathyroidism        Plan:   Return to clinic in 4 weeks.  Labs for this week includes rp, pth, upc at the [place] location on weekday or [facility] on weekend ([facility]).    Baseline creatinine is 1.2-1.5 prior to 2016.  She has been 1.8-2.4 since 2020.  PTH is 169 with a calcium of 9.6.  Continue calcitriol 0.25 mcg daily.   UPC is 2.5.  Renal US shows R 10.5 cm L 10.7 cm.  She has been lost to follow-up thus I do not know if her creatinine of 2.2-2.3 is isolated or is part of a trend of worsening renal function.    Please avoid or minimize all NSAIDS (ibuprofen, motrin, aleve, indocin, naprosyn) to minimize the risk to your kidneys.  Aspirin in a dose of 325 mg or less a day is likely the safest with regards to kidney function.  If you are able to take aspirin and do not have any bleeding problems or ulcers, this may be your best therapy.  Alternatively, acetaminophen (Tylenol) is the safer than NSAIDs with regards to kidney function.  I would ask you take this as directed on the bottle.  It is best to stay under 2 grams a day (4-500 mg tablets a day maximum).  I have no reason to explain her change in her baseline.  She has been lost to follow-up.  I will consider renal re-biopsy to see what her pathology is in this case.  She has been seen by [facility].  We discussed making lifestyle changes and using a Mediterranean diet for health benefits and weight loss.  Renal biopsy at [facility] [place] this month, either CT  or US guided.

## 2022-04-06 ENCOUNTER — LAB VISIT (OUTPATIENT)
Dept: LAB | Facility: HOSPITAL | Age: 57
End: 2022-04-06
Attending: INTERNAL MEDICINE
Payer: COMMERCIAL

## 2022-04-06 ENCOUNTER — PATIENT MESSAGE (OUTPATIENT)
Dept: NEPHROLOGY | Facility: CLINIC | Age: 57
End: 2022-04-06
Payer: COMMERCIAL

## 2022-04-06 DIAGNOSIS — N18.4 CKD (CHRONIC KIDNEY DISEASE) STAGE 4, GFR 15-29 ML/MIN: ICD-10-CM

## 2022-04-06 DIAGNOSIS — N25.81 SECONDARY RENAL HYPERPARATHYROIDISM: ICD-10-CM

## 2022-04-06 DIAGNOSIS — N28.1 ACQUIRED CYST OF KIDNEY: ICD-10-CM

## 2022-04-06 DIAGNOSIS — R80.1 PERSISTENT PROTEINURIA: ICD-10-CM

## 2022-04-06 LAB
ALBUMIN SERPL BCP-MCNC: 3.2 G/DL (ref 3.5–5.2)
ANION GAP SERPL CALC-SCNC: 9 MMOL/L (ref 8–16)
BUN SERPL-MCNC: 36 MG/DL (ref 6–20)
CALCIUM SERPL-MCNC: 9.5 MG/DL (ref 8.7–10.5)
CHLORIDE SERPL-SCNC: 104 MMOL/L (ref 95–110)
CO2 SERPL-SCNC: 26 MMOL/L (ref 23–29)
CREAT SERPL-MCNC: 2.4 MG/DL (ref 0.5–1.4)
EST. GFR  (AFRICAN AMERICAN): 25.1 ML/MIN/1.73 M^2
EST. GFR  (NON AFRICAN AMERICAN): 21.8 ML/MIN/1.73 M^2
GLUCOSE SERPL-MCNC: 85 MG/DL (ref 70–110)
PHOSPHATE SERPL-MCNC: 4.1 MG/DL (ref 2.7–4.5)
POTASSIUM SERPL-SCNC: 4.6 MMOL/L (ref 3.5–5.1)
PTH-INTACT SERPL-MCNC: 234.8 PG/ML (ref 9–77)
SODIUM SERPL-SCNC: 139 MMOL/L (ref 136–145)

## 2022-04-06 PROCEDURE — 80069 RENAL FUNCTION PANEL: CPT | Performed by: INTERNAL MEDICINE

## 2022-04-06 PROCEDURE — 36415 COLL VENOUS BLD VENIPUNCTURE: CPT | Mod: PO | Performed by: INTERNAL MEDICINE

## 2022-04-06 PROCEDURE — 83970 ASSAY OF PARATHORMONE: CPT | Performed by: INTERNAL MEDICINE

## 2022-04-12 ENCOUNTER — PATIENT MESSAGE (OUTPATIENT)
Dept: NEPHROLOGY | Facility: CLINIC | Age: 57
End: 2022-04-12
Payer: COMMERCIAL

## 2022-04-14 ENCOUNTER — OFFICE VISIT (OUTPATIENT)
Dept: URGENT CARE | Facility: CLINIC | Age: 57
End: 2022-04-14
Payer: COMMERCIAL

## 2022-04-14 VITALS
RESPIRATION RATE: 18 BRPM | HEIGHT: 64 IN | BODY MASS INDEX: 36.88 KG/M2 | SYSTOLIC BLOOD PRESSURE: 161 MMHG | TEMPERATURE: 98 F | WEIGHT: 216 LBS | DIASTOLIC BLOOD PRESSURE: 100 MMHG | HEART RATE: 59 BPM | OXYGEN SATURATION: 97 %

## 2022-04-14 DIAGNOSIS — M25.511 ACUTE PAIN OF RIGHT SHOULDER: ICD-10-CM
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
## 2022-04-14 DIAGNOSIS — M54.2 NECK PAIN: ICD-10-CM

## 2022-04-14 DIAGNOSIS — S46.911A MUSCLE STRAIN, SHOULDER REGION, RIGHT, INITIAL ENCOUNTER: ICD-10-CM

## 2022-04-14 DIAGNOSIS — M79.605 LEFT LEG PAIN: ICD-10-CM

## 2022-04-14 PROCEDURE — 1159F MED LIST DOCD IN RCRD: CPT | Mod: CPTII,S$GLB,,

## 2022-04-14 PROCEDURE — 99213 OFFICE O/P EST LOW 20 MIN: CPT | Mod: S$GLB,,,

## 2022-04-14 PROCEDURE — 3080F PR MOST RECENT DIASTOLIC BLOOD PRESSURE >= 90 MM HG: ICD-10-PCS | Mod: CPTII,S$GLB,,

## 2022-04-14 PROCEDURE — 3008F PR BODY MASS INDEX (BMI) DOCUMENTED: ICD-10-PCS | Mod: CPTII,S$GLB,,

## 2022-04-14 PROCEDURE — 3066F PR DOCUMENTATION OF TREATMENT FOR NEPHROPATHY: ICD-10-PCS | Mod: CPTII,S$GLB,,

## 2022-04-14 PROCEDURE — 3077F SYST BP >= 140 MM HG: CPT | Mod: CPTII,S$GLB,,

## 2022-04-14 PROCEDURE — 1160F PR REVIEW ALL MEDS BY PRESCRIBER/CLIN PHARMACIST DOCUMENTED: ICD-10-PCS | Mod: CPTII,S$GLB,,

## 2022-04-14 PROCEDURE — 99213 PR OFFICE/OUTPT VISIT, EST, LEVL III, 20-29 MIN: ICD-10-PCS | Mod: S$GLB,,,

## 2022-04-14 PROCEDURE — 3077F PR MOST RECENT SYSTOLIC BLOOD PRESSURE >= 140 MM HG: ICD-10-PCS | Mod: CPTII,S$GLB,,

## 2022-04-14 PROCEDURE — 1160F RVW MEDS BY RX/DR IN RCRD: CPT | Mod: CPTII,S$GLB,,

## 2022-04-14 PROCEDURE — 3008F BODY MASS INDEX DOCD: CPT | Mod: CPTII,S$GLB,,

## 2022-04-14 PROCEDURE — 3066F NEPHROPATHY DOC TX: CPT | Mod: CPTII,S$GLB,,

## 2022-04-14 PROCEDURE — 1159F PR MEDICATION LIST DOCUMENTED IN MEDICAL RECORD: ICD-10-PCS | Mod: CPTII,S$GLB,,

## 2022-04-14 PROCEDURE — 3080F DIAST BP >= 90 MM HG: CPT | Mod: CPTII,S$GLB,,

## 2022-04-14 RX ORDER — TIZANIDINE 4 MG/1
4 TABLET ORAL EVERY 6 HOURS PRN
Qty: 28 TABLET | Refills: 0 | Status: SHIPPED | OUTPATIENT
Start: 2022-04-14 | End: 2022-04-21
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
## 2022-04-14 NOTE — PATIENT INSTRUCTIONS
- Rest.    - Drink plenty of fluids.    - Acetaminophen (tylenol) or Ibuprofen (advil,motrin) as directed as needed for fever/pain. Avoid tylenol if you have a history of liver disease. Do not take ibuprofen if you have a history of GI bleeding, kidney disease, or if you take blood thinners.   - Tylenol dosing for adults: [By mouth route, immediate-release form] Dose: 325-1000 mg by mouth every 4-6h as needed; Max: 1 g/4h and 4 g/day from all sources. [By mouth route, extended-release form] Dose: 650-1300 mg Extended Release by mouth every 8h as needed; Max: 4 g/day from all sources.     - You must understand that you have received an Urgent Care treatment only and that you may be released before all of your medical problems are known or treated.   - You, the patient, will arrange for follow up care as instructed.   - If your condition worsens or fails to improve we recommend that you receive another evaluation at the ER immediately or contact your PCP to discuss your concerns or return here.   - Follow up with your PCP or specialty clinic as directed in the next 1-2 weeks if not improved or as needed.  You can call [phone] to schedule an appointment with the appropriate provider.    If your symptoms do not improve or worsen, go to the emergency room immediately.

## 2022-04-20 ENCOUNTER — PATIENT MESSAGE (OUTPATIENT)
Dept: NEPHROLOGY | Facility: CLINIC | Age: 57
End: 2022-04-20
Payer: COMMERCIAL

## 2022-04-21 ENCOUNTER — TELEPHONE (OUTPATIENT)
Dept: NEPHROLOGY | Facility: CLINIC | Age: 57
End: 2022-04-21
Payer: COMMERCIAL

## 2022-04-26 ENCOUNTER — OFFICE VISIT (OUTPATIENT)
Dept: NEPHROLOGY | Facility: CLINIC | Age: 57
End: 2022-04-26
Payer: COMMERCIAL

## 2022-04-26 ENCOUNTER — TELEPHONE (OUTPATIENT)
Dept: NEPHROLOGY | Facility: CLINIC | Age: 57
End: 2022-04-26
Payer: COMMERCIAL

## 2022-04-26 DIAGNOSIS — N18.4 CKD (CHRONIC KIDNEY DISEASE) STAGE 4, GFR 15-29 ML/MIN: Primary | ICD-10-CM

## 2022-04-26 DIAGNOSIS — R80.1 PERSISTENT PROTEINURIA: Primary | ICD-10-CM

## 2022-04-26 PROCEDURE — 3066F NEPHROPATHY DOC TX: CPT | Mod: CPTII,S$GLB,, | Performed by: INTERNAL MEDICINE

## 2022-04-26 PROCEDURE — 3066F PR DOCUMENTATION OF TREATMENT FOR NEPHROPATHY: ICD-10-PCS | Mod: CPTII,S$GLB,, | Performed by: INTERNAL MEDICINE

## 2022-04-26 PROCEDURE — 99214 PR OFFICE/OUTPT VISIT, EST, LEVL IV, 30-39 MIN: ICD-10-PCS | Mod: S$GLB,,, | Performed by: INTERNAL MEDICINE

## 2022-04-26 PROCEDURE — 99214 OFFICE O/P EST MOD 30 MIN: CPT | Mod: S$GLB,,, | Performed by: INTERNAL MEDICINE

## 2022-04-26 NOTE — TELEPHONE ENCOUNTER
----- Message from Zain Mirza RN sent at 4/26/2022 12:26 PM CDT -----  Regarding: KIDNEY BIOPSY  Good Afternoon Mireilletylor Leonard was seen in our office this am for consult for Kidney Biopsy. From Dr Hopkins exam he believes her kidneys are too scarred to have a kidney biopsy therefore he has scheduled her for an Ultrasound to determine better. I am sure he will touch bases with Dr Dozier .    Thanks   Thuy Almaguer RN

## 2022-04-26 NOTE — PROGRESS NOTES
CHIEF COMPLAINT/HPI: Jacqui PINEDO is a 56 y.o. female for evaluation prekidney biopsy.    HPI:  I have seen Jacqui Saucedo today for a kidney biopsy. She was diagnosed to have CKD since 2006. A kidney biopsy was done at that stage which showed FSGS with 1/6 sclerosed glomeruli. We do not have the full detail of the report. The patient proteinuria and her eGFR was 44ml/min with S. Creatinine 1.4mg/dl in 2016. Her kidney function continued to decline over the past years and currently her eGFR is 22ml/min with S. Creatinine 2.4mg/dl. She had an ultrasound for her kidneys on 5/15/2020 showed decreased corticomedullary differentiation with increased echogenicity of both kidneys. I have repeated a bed side ultrasound and both kidneys are echogenic. I have explained the kidney biopsy procedure to the patient with the complications. In her conditions taking in consideration the long-term history and the ultrasound image of the kidney the risk of bleeding will be high, and the yield of the biopsy will less likely contribute to changing the management plan. I have requested a full retroperitoneal ultrasound and asked the patient to discuss further the management plan with her primary Nephrologist.         Past Medical History:   Diagnosis Date    Abnormal Pap smear     Chronic kidney disease     - Stage 3    Depression     GERD (gastroesophageal reflux disease)     Mitral valve insufficiency     Sleep apnea     Thyroid disease        Jacqui PINEDO reports that she quit smoking about 11 years ago. She has a 10.00 pack-year smoking history. She has never used smokeless tobacco. She reports previous alcohol use of about 3.0 - 4.0 standard drinks of alcohol per week. She reports that she does not use drugs.    Family History   Problem Relation Age of Onset    Hypertension Mother     Heart attack Mother     Diabetes Maternal Uncle     Heart attack Maternal Uncle     Heart attack Maternal Grandfather     Breast  cancer Neg Hx     Colon cancer Neg Hx     Ovarian cancer Neg Hx        ROS:    General: No fever, chills, change in appetite or weight loss  Skin: No rashes or pruritus  Head: No headaches or recent head trauma  Eyes: No changes in vision or eye pain  Nodes: No swollen glands  Pulmonary: No dyspnea, wheezes, cough or sputum production  Cardiovascular: No chest pain, edema, PND, orthopnea or reduced exercise tolerance  Abdomen: No abdominal pain, nausea, vomiting, constipation or diarrhea  Urinary: No flank pain, dysuria or hematuria  Peripheral Vascular: No claudication or cyanosis  Musculoskeletal: No joint stiffness, swelling or back pain  Neurologic: No history of seizures, tremors, forcal weakness or numbness    PE:        HEENT: Normocephalic, atraumatic, EOMI, PERRLA, normal conjunctive and lids, supple neck with no thyromeglay or masses, normal oropharynx, hearing intact  Cardiovascular: Regular without murmur, gallop or rub, no edema  Respiratory: Clear bilaterally without rales, rhonchi, wheezes with normal effort  Abdomen: Soft, nontender/nondistended, positive bowel sounds, no hepatospenomegaly  Extremities: No cyanosis, clubbing, normal gait  Skin: No rashes, ulcers, induration  Psych: Oriented x 3 with normal mood and effect    Impression and plan:  1. CKD stage IV with EGFR of 22ml/min. with echogenic bilateral kidneys. The kidney biopsy will carry higher bleeding risk and will less likely contribute to change the management.   2. Proteinuria secondary to FSGS with CKD stage IV.     3. Morbid obesity. Will need to be addressed via the primary physician.       ROHAN MERINO.Hope. MD. JORDAN. PIOTR.  , [employer] [facility] [employer] ([place]  Nephrology Consultant. [facility].   [address]. [address]th [place] [address].    email: [email or web address].  Tel: Office: [phone]

## 2022-05-23 ENCOUNTER — HOSPITAL ENCOUNTER (OUTPATIENT)
Dept: RADIOLOGY | Facility: HOSPITAL | Age: 57
Discharge: HOME | End: 2022-05-23
Attending: INTERNAL MEDICINE
Payer: COMMERCIAL

## 2022-05-23 DIAGNOSIS — R80.1 PERSISTENT PROTEINURIA: ICD-10-CM

## 2022-05-23 PROCEDURE — 76770 US EXAM ABDO BACK WALL COMP: CPT | Mod: TC

## 2022-05-23 PROCEDURE — 76770 US RETROPERITONEAL COMPLETE: ICD-10-PCS | Mod: 26,,, | Performed by: RADIOLOGY

## 2022-05-23 PROCEDURE — 76770 US EXAM ABDO BACK WALL COMP: CPT | Mod: 26,,, | Performed by: RADIOLOGY

## 2022-06-07 ENCOUNTER — PATIENT MESSAGE (OUTPATIENT)
Dept: NEPHROLOGY | Facility: CLINIC | Age: 57
End: 2022-06-07
Payer: COMMERCIAL

## 2022-06-29 ENCOUNTER — TELEPHONE (OUTPATIENT)
Dept: FAMILY MEDICINE | Facility: CLINIC | Age: 57
End: 2022-06-29
Payer: COMMERCIAL

## 2022-06-29 NOTE — TELEPHONE ENCOUNTER
----- Message from Zena Andino sent at 6/29/2022  1:34 PM CDT -----  Regarding: request for a phone call in regards to an virtual appt that did not go through  Type: Patient Call Back    Who called:Jacqui     What is the request in detail: the patient is calling to request a call back from Dr. Valentin in regards to her virtual appt that was on today. She stated that she has logged into the portal and completed the e-pre check, but the  is constantly spinning, not allowing her to start the virtual visit. Patient stated that she has tested positive for covid and that she really needs the appt. Please return call at earliest convenience.    Can the clinic reply by [facility]?no     Would the patient rather a call back or a response via [facility]? Call bck     Best call back number:[phone]

## 2022-06-29 NOTE — TELEPHONE ENCOUNTER
Called patient and rescheduled patient virtual visit for 06/30 at 1:00pm patient states she tested Positive for COVID and needs this virtual visit due to her symptoms

## 2022-06-30 ENCOUNTER — OFFICE VISIT (OUTPATIENT)
Dept: FAMILY MEDICINE | Facility: CLINIC | Age: 57
End: 2022-06-30
Payer: COMMERCIAL

## 2022-06-30 ENCOUNTER — TELEPHONE (OUTPATIENT)
Dept: FAMILY MEDICINE | Facility: CLINIC | Age: 57
End: 2022-06-30
Payer: COMMERCIAL

## 2022-06-30 DIAGNOSIS — U07.1 COVID-19 VIRUS INFECTION: Primary | ICD-10-CM

## 2022-06-30 DIAGNOSIS — U07.1 COVID: ICD-10-CM

## 2022-06-30 PROCEDURE — 3066F PR DOCUMENTATION OF TREATMENT FOR NEPHROPATHY: ICD-10-PCS | Mod: CPTII,95,, | Performed by: STUDENT IN AN ORGANIZED HEALTH CARE EDUCATION/TRAINING PROGRAM

## 2022-06-30 PROCEDURE — 99213 OFFICE O/P EST LOW 20 MIN: CPT | Mod: 95,,, | Performed by: STUDENT IN AN ORGANIZED HEALTH CARE EDUCATION/TRAINING PROGRAM

## 2022-06-30 PROCEDURE — 3066F NEPHROPATHY DOC TX: CPT | Mod: CPTII,95,, | Performed by: STUDENT IN AN ORGANIZED HEALTH CARE EDUCATION/TRAINING PROGRAM

## 2022-06-30 PROCEDURE — 99213 PR OFFICE/OUTPT VISIT, EST, LEVL III, 20-29 MIN: ICD-10-PCS | Mod: 95,,, | Performed by: STUDENT IN AN ORGANIZED HEALTH CARE EDUCATION/TRAINING PROGRAM

## 2022-06-30 NOTE — PROGRESS NOTES
2022    Jacqui Leonard  [id number]    CC: COVID    HPI    Started with COVID symptoms since  (4 days)  Everyday with different symptoms  Today is having SOB but is ok while sitting still   When she tries to do something she starts to feel really bad. Is able to ambulate to bathroom and around house but very fatigue      Negative 10 point ROS outside of HPI    Social History     Socioeconomic History    Marital status:    Tobacco Use    Smoking status: Former Smoker     Packs/day: 0.50     Years: 20.00     Pack years: 10.00     Quit date: 2010     Years since quittin.5    Smokeless tobacco: Never Used   Substance and Sexual Activity    Alcohol use: Not Currently     Alcohol/week: 3.0 - 4.0 standard drinks     Types: 3 - 4 Shots of liquor per week     Comment: sober 1 year    Drug use: No    Sexual activity: Yes     Partners: Male     Birth control/protection: Post-menopausal           Current Outpatient Medications:     calcitRIOL (ROCALTROL) 0.25 MCG Cap, TAKE 1 CAPSULE (0.25 MCG TOTAL) BY MOUTH ONCE DAILY., Disp: 30 capsule, Rfl: 5    omeprazole (PRILOSEC) 20 MG capsule, Take 1 capsule (20 mg total) by mouth once daily., Disp: 90 capsule, Rfl: 1    pravastatin (PRAVACHOL) 10 MG tablet, TAKE 1 TABLET BY MOUTH EVERY DAY, Disp: 90 tablet, Rfl: 1      Physical Exam  Vitals unable to obtain    Gen: well appearing  Resp: speaks in full sentences. Non labored breathing  Cv: non-diaphoretic    1. COVID-19 virus infection  - molnupiravir 200 mg capsule (EUA); Take 4 capsules (800 mg total) by mouth every 12 (twelve) hours. for 5 days  Dispense: 40 capsule; Refill: 0  -does not qualify for infusion and unable to take paxlovid 2/2 kidney function    Discussed signs symptoms to present to the ED and recommended routine CDC COVID guidelines    Sharona Valentin MD  Family Medicine

## 2022-06-30 NOTE — TELEPHONE ENCOUNTER
Call placed to patient and notified of MD order related to covid dx, molnupiravir 20mg. Patient verbalized understanding and thank you.

## 2022-06-30 NOTE — TELEPHONE ENCOUNTER
----- Message from Omkar Harrison sent at 6/30/2022  2:00 PM CDT -----  Type:  Patient Returning Call    Who Called: self    Who Left Message for Patient: Tonny    Does the patient know what this is regarding?:yes    Would the patient rather a call back or a response via [facility]? call    Best Call Back Number:[phone] (M)

## 2022-08-01 ENCOUNTER — PATIENT MESSAGE (OUTPATIENT)
Dept: NEPHROLOGY | Facility: CLINIC | Age: 57
End: 2022-08-01
Payer: COMMERCIAL

## 2022-08-01 DIAGNOSIS — N18.4 CKD (CHRONIC KIDNEY DISEASE) STAGE 4, GFR 15-29 ML/MIN: Primary | ICD-10-CM

## 2022-08-02 ENCOUNTER — LAB VISIT (OUTPATIENT)
Dept: LAB | Facility: HOSPITAL | Age: 57
End: 2022-08-02
Attending: INTERNAL MEDICINE
Payer: COMMERCIAL

## 2022-08-02 DIAGNOSIS — N18.4 CKD (CHRONIC KIDNEY DISEASE) STAGE 4, GFR 15-29 ML/MIN: ICD-10-CM

## 2022-08-02 LAB
ALBUMIN SERPL BCP-MCNC: 3.5 G/DL (ref 3.5–5.2)
ANION GAP SERPL CALC-SCNC: 9 MMOL/L (ref 8–16)
BUN SERPL-MCNC: 39 MG/DL (ref 6–20)
CALCIUM SERPL-MCNC: 9.3 MG/DL (ref 8.7–10.5)
CHLORIDE SERPL-SCNC: 102 MMOL/L (ref 95–110)
CO2 SERPL-SCNC: 25 MMOL/L (ref 23–29)
CREAT SERPL-MCNC: 2.7 MG/DL (ref 0.5–1.4)
EST. GFR  (NO RACE VARIABLE): 20 ML/MIN/1.73 M^2
GLUCOSE SERPL-MCNC: 84 MG/DL (ref 70–110)
PHOSPHATE SERPL-MCNC: 3.9 MG/DL (ref 2.7–4.5)
POTASSIUM SERPL-SCNC: 4.9 MMOL/L (ref 3.5–5.1)
PTH-INTACT SERPL-MCNC: 499.4 PG/ML (ref 9–77)
SODIUM SERPL-SCNC: 136 MMOL/L (ref 136–145)

## 2022-08-02 PROCEDURE — 83970 ASSAY OF PARATHORMONE: CPT | Performed by: INTERNAL MEDICINE

## 2022-08-02 PROCEDURE — 80069 RENAL FUNCTION PANEL: CPT | Performed by: INTERNAL MEDICINE

## 2022-08-02 PROCEDURE — 36415 COLL VENOUS BLD VENIPUNCTURE: CPT | Mod: PO | Performed by: INTERNAL MEDICINE

## 2022-08-03 ENCOUNTER — PATIENT MESSAGE (OUTPATIENT)
Dept: BARIATRICS | Facility: CLINIC | Age: 57
End: 2022-08-03
Payer: COMMERCIAL

## 2022-08-05 ENCOUNTER — TELEPHONE (OUTPATIENT)
Dept: NEPHROLOGY | Facility: CLINIC | Age: 57
End: 2022-08-05

## 2022-08-05 ENCOUNTER — PATIENT MESSAGE (OUTPATIENT)
Dept: NEPHROLOGY | Facility: CLINIC | Age: 57
End: 2022-08-05

## 2022-08-05 ENCOUNTER — OFFICE VISIT (OUTPATIENT)
Dept: NEPHROLOGY | Facility: CLINIC | Age: 57
End: 2022-08-05
Payer: COMMERCIAL

## 2022-08-05 VITALS — DIASTOLIC BLOOD PRESSURE: 80 MMHG | SYSTOLIC BLOOD PRESSURE: 148 MMHG

## 2022-08-05 DIAGNOSIS — R80.1 PERSISTENT PROTEINURIA: ICD-10-CM

## 2022-08-05 DIAGNOSIS — N18.4 CKD (CHRONIC KIDNEY DISEASE) STAGE 4, GFR 15-29 ML/MIN: Primary | ICD-10-CM

## 2022-08-05 DIAGNOSIS — N25.81 SECONDARY RENAL HYPERPARATHYROIDISM: ICD-10-CM

## 2022-08-05 DIAGNOSIS — N28.1 ACQUIRED CYST OF KIDNEY: ICD-10-CM

## 2022-08-05 DIAGNOSIS — N05.1 FSGS (FOCAL SEGMENTAL GLOMERULOSCLEROSIS): ICD-10-CM

## 2022-08-05 PROCEDURE — 99215 OFFICE O/P EST HI 40 MIN: CPT | Mod: S$GLB,,, | Performed by: INTERNAL MEDICINE

## 2022-08-05 PROCEDURE — 1159F PR MEDICATION LIST DOCUMENTED IN MEDICAL RECORD: ICD-10-PCS | Mod: CPTII,S$GLB,, | Performed by: INTERNAL MEDICINE

## 2022-08-05 PROCEDURE — 3077F SYST BP >= 140 MM HG: CPT | Mod: CPTII,S$GLB,, | Performed by: INTERNAL MEDICINE

## 2022-08-05 PROCEDURE — 3079F DIAST BP 80-89 MM HG: CPT | Mod: CPTII,S$GLB,, | Performed by: INTERNAL MEDICINE

## 2022-08-05 PROCEDURE — 99999 PR PBB SHADOW E&M-EST. PATIENT-LVL III: CPT | Mod: PBBFAC,,, | Performed by: INTERNAL MEDICINE

## 2022-08-05 PROCEDURE — 3066F NEPHROPATHY DOC TX: CPT | Mod: CPTII,S$GLB,, | Performed by: INTERNAL MEDICINE

## 2022-08-05 PROCEDURE — 3077F PR MOST RECENT SYSTOLIC BLOOD PRESSURE >= 140 MM HG: ICD-10-PCS | Mod: CPTII,S$GLB,, | Performed by: INTERNAL MEDICINE

## 2022-08-05 PROCEDURE — 99215 PR OFFICE/OUTPT VISIT, EST, LEVL V, 40-54 MIN: ICD-10-PCS | Mod: S$GLB,,, | Performed by: INTERNAL MEDICINE

## 2022-08-05 PROCEDURE — 3066F PR DOCUMENTATION OF TREATMENT FOR NEPHROPATHY: ICD-10-PCS | Mod: CPTII,S$GLB,, | Performed by: INTERNAL MEDICINE

## 2022-08-05 PROCEDURE — 1159F MED LIST DOCD IN RCRD: CPT | Mod: CPTII,S$GLB,, | Performed by: INTERNAL MEDICINE

## 2022-08-05 PROCEDURE — 3079F PR MOST RECENT DIASTOLIC BLOOD PRESSURE 80-89 MM HG: ICD-10-PCS | Mod: CPTII,S$GLB,, | Performed by: INTERNAL MEDICINE

## 2022-08-05 PROCEDURE — 99999 PR PBB SHADOW E&M-EST. PATIENT-LVL III: ICD-10-PCS | Mod: PBBFAC,,, | Performed by: INTERNAL MEDICINE

## 2022-08-05 RX ORDER — CALCITRIOL 0.5 UG/1
0.5 CAPSULE ORAL DAILY
Qty: 90 CAPSULE | Refills: 1 | Status: SHIPPED | OUTPATIENT
Start: 2022-08-05 | End: 2023-06-05

## 2022-08-05 NOTE — PROGRESS NOTES
Subjective:       Patient ID: Jacqui Leonard is a 56 y.o. White female who presents for return patient evaluation for chronic renal failure.      She has fatigue and also has a great deal of stress with her job.  Her blood pressure at home has been 110-120/60-70.  There have been no recent illnesses, hospitalizations or procedures.  She had a renal biopsy in 2006.  She had COVID in June 2022.      Review of Systems   Constitutional: Positive for fatigue. Negative for appetite change, chills and fever.   HENT: Negative for congestion.    Eyes: Negative for visual disturbance.   Respiratory: Positive for shortness of breath (with exertion). Negative for cough.    Cardiovascular: Negative for chest pain and leg swelling.   Gastrointestinal: Negative for nausea.   Genitourinary: Negative for difficulty urinating, dysuria and hematuria.   Musculoskeletal: Positive for arthralgias (R shoulder) and gait problem. Negative for back pain.   Skin: Negative for rash.   Neurological: Negative for headaches.   Psychiatric/Behavioral: Positive for sleep disturbance. The patient is nervous/anxious.        The past medical, family and social histories were reviewed for this encounter.     BP (!) 148/80 (BP Location: Right arm, Patient Position: Sitting, BP Method: Large (Manual))     Objective:      Physical Exam  Vitals reviewed.   Constitutional:       General: She is not in acute distress.     Appearance: She is well-developed. She is obese.   HENT:      Head: Normocephalic and atraumatic.   Eyes:      General: No scleral icterus.     Conjunctiva/sclera: Conjunctivae normal.   Neck:      Vascular: No JVD.   Cardiovascular:      Rate and Rhythm: Normal rate and regular rhythm.      Heart sounds: Normal heart sounds. No murmur heard.    No friction rub. No gallop.   Pulmonary:      Effort: Pulmonary effort is normal. No respiratory distress.      Breath sounds: Normal breath sounds. No wheezing.   Abdominal:      General:  Bowel sounds are normal. There is no distension.      Palpations: Abdomen is soft.      Tenderness: There is no abdominal tenderness.   Musculoskeletal:      Cervical back: Normal range of motion.      Right lower leg: Edema (trace-1+) present.      Left lower leg: Edema (trace-1+) present.   Skin:     General: Skin is warm and dry.      Findings: No rash.   Neurological:      Mental Status: She is alert and oriented to person, place, and time.   Psychiatric:         Mood and Affect: Mood normal.         Behavior: Behavior normal.        Assessment:       1. CKD (chronic kidney disease) stage 4, GFR 15-29 ml/min    2. Persistent proteinuria    3. Acquired cyst of kidney    4. Secondary renal hyperparathyroidism    5. FSGS (focal segmental glomerulosclerosis)        Plan:   Return to clinic in 3 months.  Labs for this week includes rp, pth, cbc, upc at the [place] location on weekday or [facility] on weekend ([facility]).    Baseline creatinine is 1.2-1.5 prior to 2016.  She has been 1.8-2.4 since 2020.  PTH is 499 with a calcium of 9.3.  Increase calcitriol to 0.5 mcg daily.   UPC is 5.4.  Renal US shows R 10.5 cm L 10.7 cm.  Please avoid or minimize all NSAIDS (ibuprofen, motrin, aleve, indocin, naprosyn) to minimize the risk to your kidneys.  Aspirin in a dose of 325 mg or less a day is likely the safest with regards to kidney function.  If you are able to take aspirin and do not have any bleeding problems or ulcers, this may be your best therapy.  Alternatively, acetaminophen (Tylenol) is the safer than NSAIDs with regards to kidney function.  I would ask you take this as directed on the bottle.  It is best to stay under 2 grams a day (4-500 mg tablets a day maximum).  I have no reason to explain her change in her baseline.  She has been lost to follow-up.  Re-biopsy was not done due to increased bleeding risk.  She has been seen by [facility].  We discussed making lifestyle changes and using a Mediterranean diet for  health benefits and weight loss.  Refer to [facility].

## 2022-08-11 ENCOUNTER — TELEPHONE (OUTPATIENT)
Dept: TRANSPLANT | Facility: CLINIC | Age: 57
End: 2022-08-11
Payer: COMMERCIAL

## 2022-08-11 NOTE — LETTER
August 11, 2022    Keshav Dozier  [address]  [address] FLO[address]  [facility] [address]  Phone: [phone]  Fax: [phone]    Dear Dr. Keshav Dozier:    Patient: Jacqui Leonard  MR Number [id number]  Date of Birth 1965    Thank you for your referral of Jacqui Leonard to our transplant program.      Your patient's information will be screened by our Referral Nurse Coordinators to determine initial medical candidacy and if any further records are required. We will contact you if further information is needed to process the referral.     Once all needed information is received it will be forwarded to our Transplant Financial Coordinators who will obtain insurance authorization. Upon insurance approval, your patient will be contacted by our  to schedule their appointments with the transplant team. When appointments are made you will receive a copy of the appointment letter that your patient will receive.     This process may take two to six weeks to complete.     In the event your patient is not a candidate a copy of our transplant programs opinion including reasons will be returned to you to comply with your yearly review regulations. A copy of that letter will also be sent to the patient.     The following information is needed to process a referral:  Medicare form 2728 for all patients on dialysis.  Dental clearance is required if your patient has primary Medicaid.  Current immunization record.  This information can be faxed to [phone].  Current Dietician evaluation can be faxed to [phone].    Thank you again for your trust in our program and the care of your patient.  If there is anything we can do for you or your staff, please feel free to contact us at [phone].    Sincerely,   [facility]  Kidney & Kidney/Pancreas Transplant Team  [facility] [place] [address]  [phone]

## 2022-08-11 NOTE — TELEPHONE ENCOUNTER
Contacted patient to review initial intake information. Patient reports the followin. Can you walk up a flight of stairs without getting short of breath or stopping? Yes   2. Can you walk one block without getting short of breath or having to stop? Yes   3. Do you use oxygen? No   4. Do you use a cane, walker, or wheel chair to assist in mobility? No   5. Have you been hospitalized or had recent surgery in the last 6 months? No    A. Stroke   B. Heart surgery or heart catheterization   C. Broken bone  6. Do you have any cuts, open sores (ulcers), or wounds anywhere on your body? No   7. Do you go to dialysis? No   8. Preferred appointment day? Anyday  9. Caregiver? Sister (Zulay)    Patient is aware the next steps will include completing records and compliance verification. Patient is aware once provider review and insurance authorization is received we will contact patient to schedule initial visit. Patient is aware that initial visit will begin prior to / at 7 am and will conclude at approximately 3 pm on date of appointment. All questions answered at this time.

## 2022-08-15 ENCOUNTER — TELEPHONE (OUTPATIENT)
Dept: TRANSPLANT | Facility: CLINIC | Age: 57
End: 2022-08-15
Payer: COMMERCIAL

## 2022-08-30 ENCOUNTER — PATIENT MESSAGE (OUTPATIENT)
Dept: TRANSPLANT | Facility: CLINIC | Age: 57
End: 2022-08-30
Payer: COMMERCIAL

## 2022-08-30 DIAGNOSIS — Z76.82 ORGAN TRANSPLANT CANDIDATE: Primary | ICD-10-CM

## 2022-09-12 ENCOUNTER — TELEPHONE (OUTPATIENT)
Dept: TRANSPLANT | Facility: CLINIC | Age: 57
End: 2022-09-12
Payer: COMMERCIAL

## 2022-09-30 ENCOUNTER — TELEPHONE (OUTPATIENT)
Dept: TRANSPLANT | Facility: CLINIC | Age: 57
End: 2022-09-30
Payer: COMMERCIAL

## 2022-10-06 ENCOUNTER — PATIENT MESSAGE (OUTPATIENT)
Dept: BARIATRICS | Facility: CLINIC | Age: 57
End: 2022-10-06
Payer: COMMERCIAL

## 2022-10-25 ENCOUNTER — TELEPHONE (OUTPATIENT)
Dept: TRANSPLANT | Facility: CLINIC | Age: 57
End: 2022-10-25
Payer: COMMERCIAL

## 2022-10-25 NOTE — LETTER
Date: 10/25/2022          Referral Process      To: Dialysis Unit  and Charge RN From: [facility] Workers and      Kidney Transplant Nurse Coordinators    RE: Jacqui Leonard, 1965, [id number]     At [facility], we conduct adherence checks as an important part of transplant care. Initial and listed patient assessments are not complete without adherence information.        Please complete the following information:                 Data from the last 3 months:  (data from last 3 months preferred):    Number of AMAs with dates, time, and reasons: ____________________________________________________    ______________________________________________________________________________________________    ______________________________________________________________________________________________    Number of No-Shows with dates and reasons: ______________________________________________________      ______________________________________________________________________________________________      Any concerns with Caregivers:  YES / NO    If yes, please explain:  ___________________________________________________________________________    ______________________________________________________________________________________________     Any concerns with Transportation:  YES / NO    If yes, please explain:  ___________________________________________________________________________    ______________________________________________________________________________________________    Any Psychiatric and/or Psychosocial concerns:  YES / NO     If yes, please explain: ___________________________________________________________________________    ______________________________________________________________________________________________      PLEASE RETURN TO: FAX: [phone]     Thank you for collaborating with us in the care of this patient.            [address]  ?  [place] [address]  ?  phone [phone]  ?  fax [phone]  ?  [email or web address]  Confidentiality notice: The accompanying facsimile is intended solely for the use of the recipient designated above. Document(s) transmitted herewith may contain information that is confidential and privileged. Delivery, distribution or dissemination of this communication other than to the intended recipient is strictly prohibited. If you have received this facsimile in error, please notify us immediately by telephone.

## 2022-11-02 ENCOUNTER — TELEPHONE (OUTPATIENT)
Dept: TRANSPLANT | Facility: CLINIC | Age: 57
End: 2022-11-02
Payer: COMMERCIAL

## 2022-11-03 ENCOUNTER — HOSPITAL ENCOUNTER (OUTPATIENT)
Dept: RADIOLOGY | Facility: HOSPITAL | Age: 57
Discharge: HOME | End: 2022-11-03
Attending: NURSE PRACTITIONER
Payer: COMMERCIAL

## 2022-11-03 ENCOUNTER — OFFICE VISIT (OUTPATIENT)
Dept: TRANSPLANT | Facility: CLINIC | Age: 57
End: 2022-11-03
Payer: COMMERCIAL

## 2022-11-03 VITALS
HEIGHT: 63 IN | TEMPERATURE: 97 F | OXYGEN SATURATION: 99 % | DIASTOLIC BLOOD PRESSURE: 67 MMHG | RESPIRATION RATE: 18 BRPM | HEART RATE: 67 BPM | SYSTOLIC BLOOD PRESSURE: 143 MMHG | BODY MASS INDEX: 38.39 KG/M2 | WEIGHT: 216.69 LBS

## 2022-11-03 DIAGNOSIS — Z01.818 PRE-TRANSPLANT EVALUATION FOR CKD (CHRONIC KIDNEY DISEASE): ICD-10-CM

## 2022-11-03 DIAGNOSIS — Z76.82 ORGAN TRANSPLANT CANDIDATE: ICD-10-CM

## 2022-11-03 DIAGNOSIS — N05.1 FSGS (FOCAL SEGMENTAL GLOMERULOSCLEROSIS): ICD-10-CM

## 2022-11-03 DIAGNOSIS — N25.81 SECONDARY RENAL HYPERPARATHYROIDISM: ICD-10-CM

## 2022-11-03 DIAGNOSIS — N18.4 CKD (CHRONIC KIDNEY DISEASE) STAGE 4, GFR 15-29 ML/MIN: ICD-10-CM

## 2022-11-03 DIAGNOSIS — I34.1 MITRAL VALVE PROLAPSE: Primary | ICD-10-CM

## 2022-11-03 PROCEDURE — 72170 XR PELVIS ROUTINE AP: ICD-10-PCS | Mod: 26,TXP,, | Performed by: RADIOLOGY

## 2022-11-03 PROCEDURE — 93978 US DOPP ILIACS BILATERAL: ICD-10-PCS | Mod: 26,TXP,, | Performed by: RADIOLOGY

## 2022-11-03 PROCEDURE — 72170 X-RAY EXAM OF PELVIS: CPT | Mod: 26,TXP,, | Performed by: RADIOLOGY

## 2022-11-03 PROCEDURE — 97802 PR MED NUTR THER, 1ST, INDIV, EA 15 MIN: ICD-10-PCS | Mod: S$GLB,TXP,,

## 2022-11-03 PROCEDURE — 93978 VASCULAR STUDY: CPT | Mod: 26,TXP,, | Performed by: RADIOLOGY

## 2022-11-03 PROCEDURE — 71046 XR CHEST PA AND LATERAL: ICD-10-PCS | Mod: 26,TXP,, | Performed by: RADIOLOGY

## 2022-11-03 PROCEDURE — 72170 X-RAY EXAM OF PELVIS: CPT | Mod: TC,TXP

## 2022-11-03 PROCEDURE — 99205 OFFICE O/P NEW HI 60 MIN: CPT | Mod: S$GLB,TXP,, | Performed by: INTERNAL MEDICINE

## 2022-11-03 PROCEDURE — 99999 PR PBB SHADOW E&M-EST. PATIENT-LVL IV: CPT | Mod: PBBFAC,TXP,,

## 2022-11-03 PROCEDURE — 97802 MEDICAL NUTRITION INDIV IN: CPT | Mod: S$GLB,TXP,,

## 2022-11-03 PROCEDURE — 99214 OFFICE O/P EST MOD 30 MIN: CPT | Mod: S$GLB,TXP,, | Performed by: TRANSPLANT SURGERY

## 2022-11-03 PROCEDURE — 93978 VASCULAR STUDY: CPT | Mod: TC,TXP

## 2022-11-03 PROCEDURE — 99999 PR PBB SHADOW E&M-EST. PATIENT-LVL IV: ICD-10-PCS | Mod: PBBFAC,TXP,,

## 2022-11-03 PROCEDURE — 99205 PR OFFICE/OUTPT VISIT, NEW, LEVL V, 60-74 MIN: ICD-10-PCS | Mod: S$GLB,TXP,, | Performed by: INTERNAL MEDICINE

## 2022-11-03 PROCEDURE — 99214 PR OFFICE/OUTPT VISIT, EST, LEVL IV, 30-39 MIN: ICD-10-PCS | Mod: S$GLB,TXP,, | Performed by: TRANSPLANT SURGERY

## 2022-11-03 PROCEDURE — 71046 X-RAY EXAM CHEST 2 VIEWS: CPT | Mod: TC,TXP

## 2022-11-03 PROCEDURE — 71046 X-RAY EXAM CHEST 2 VIEWS: CPT | Mod: 26,TXP,, | Performed by: RADIOLOGY

## 2022-11-03 RX ORDER — CLOTRIMAZOLE AND BETAMETHASONE DIPROPIONATE 10; .64 MG/G; MG/G
1 CREAM TOPICAL
COMMUNITY
Start: 2022-10-18

## 2022-11-03 NOTE — PROGRESS NOTES
Transplant Recipient Adult Psychosocial Assessment    Jacqui Leonard  [address] [facility] [address]  Telephone Information:   Mobile [phone]   Home  [phone] (home)  Work  [phone] (work)  E-mail  [email or web address]    Sex: female  YOB: 1965  Age: 56 y.o.    Encounter Date: 11/3/2022  [place] Citizen: yes  Primary Language: English   Needed: no    Emergency Contact:  Name: Linda Buchanan  Relationship: friend  Address: [address] [place] [address]  Phone Numbers:  [phone] (home),[phone] (mobile)    Family/Social Support:   Number of dependents/: 2 dogs and two cats  Marital history:  and  once.  One dtr from marriage from whom she is estranged.  Other family dynamics: Pt stated she is estranged from her father's side of the family and never got to know her two half sisters and one half brother.  She is her mother's only child.  Mother is .  Estranged from father and her dtr who has mental illness.      Household Composition:    The patient lives alone.     Do you and your caregivers have access to reliable transportation? yes  PRIMARY CAREGIVER: Linda Mccallum will be primary caregiver, phone number [phone].      provided in-depth information to patient and caregiver regarding pre- and post-transplant caregiver role.   strongly encourages patient and caregiver to have concrete plan regarding post-transplant care giving, including back-up caregiver(s) to ensure care giving needs are met as needed.    Patient and Caregiver states understanding all aspects of caregiver role/commitment and is able/willing/committed to being caregiver to the fullest extent necessary.    Patient and Caregiver verbalizes understanding of the education provided today and caregiver responsibilities.         remains available. Patient and Caregiver agree to contact  in a timely manner if concerns  arise.      Able to take time off work without financial concerns: yes.     Additional Significant Others who will Assist with Transplant:  Name: Zulay Lomax [phone]  Age: 58  City: [place] State: [place]   (Splits time between home in [place] and [place])  Relationship:  cousin  Does person drive? yes    Name: Sue Ngo [phone]  Age: 81  City: [place] State: [place]  Relationship:  aunt  Does person drive? yes    Name: Maura Olivo  [phone]  Age: not provided  City: [place] State: [place]  Relationship: friend/nurse  Does person drive? Yes    Name: Khushboo Regalado [phone]  Age: 56  City[place]  Relationship: friend  Does person drive? yes    Living Will:   Healthcare Power of : no  Advance Directives on file: <<no information> per medical record.  Verbally reviewed LW/HCPA information.   provided patient with copy of LW/HCPA documents and provided education on completion of forms.    Living Donors: No.    Highest Education Level: Attended College/Technical School  Reading Ability: college  Reports difficulty with: N/A  Learns Best By:  hands on     Status: no  VA Benefits: no     Working for Income: yes  If yes, working activity level: Working Full Time  Patient is employed as an  at [employer] 30+ yrs..    Spouse/Significant Other Employment: primary caregiver is retired    Disabled: no    Monthly Income:  Other: $not provided  Able to afford all costs now and if transplanted, including medications: yes  Patient and Caregiver verbalizes understanding of personal responsibilities related to transplant costs and the importance of having a financial plan to ensure that patients transplant costs are fully covered.       provided fundraising information/education. Patient and Caregiververbalizes understanding.   remains available.    Insurance:   Payer/Plan Subscr  Sex Relation Sub. Ins. ID Effective Group  Num   1. [facility]* CHRISTOS AGUILAR* 1965 Female Self [id number] 1/1/15 [id number]                                   [address]   2. [facility]* CHRISTOS AGUILAR* 1965  Self [id number] 8/25/22                                    [address] [address]     Primary Insurance (for UNOS reporting): Private Insurance  Secondary Insurance (for UNOS reporting): None  Patient and Caregiver verbalizes clear understanding that patient may experience difficulty obtaining and/or be denied insurance coverage post-surgery. This includes and is not limited to disability insurance, life insurance, health insurance, burial insurance, long term care insurance, and other insurances.      Patient and Caregiver also reports understanding that future health concerns related to or unrelated to transplantation may not be covered by patient's insurance.  Resources and information provided and reviewed.     Patient and Caregiver provides verbal permission to release any necessary information to outside resources for patient care and discharge planning.  Resources and information provided are reviewed.      Dialysis Adherence: Patient and Caregiver reports is predialysis and sees nephrologist every three months.  She states she is compliant with all  healthcare recommendations.    Infusion Service: patient utilizing? no  Home Health: patient utilizing? no  DME: no  Pulmonary/Cardiac Rehab: denies   ADLS:  Pt states ability to independently accomplish all adls.     Adherence:   Pt states current and expected compliance with all healthcare recommendations. .  Adherence education and counseling provided.     Per History Section:  Past Medical History:   Diagnosis Date    Abnormal Pap smear     Chronic kidney disease     - Stage 3    Depression     GERD (gastroesophageal reflux disease)     Mitral valve insufficiency     Mitral valve prolapse     Obesity     Sleep apnea     Thyroid disease      Social History     Tobacco Use    Smoking  status: Former     Packs/day: 0.50     Years: 20.00     Pack years: 10.00     Types: Cigarettes     Quit date: 2010     Years since quittin.8    Smokeless tobacco: Never   Substance Use Topics    Alcohol use: Not Currently     Alcohol/week: 3.0 - 4.0 standard drinks     Types: 3 - 4 Shots of liquor per week     Comment: sober 1 year     Social History     Substance and Sexual Activity   Drug Use No     Social History     Substance and Sexual Activity   Sexual Activity Yes    Partners: Male    Birth control/protection: Post-menopausal       Per Today's Psychosocial:  Tobacco:  states she quit smoking ten years ago .  Alcohol: none, patient denies any use.  Illicit Drugs/Non-prescribed Medications: none, patient denies any use.    Patient and Caregiver states clear understanding of the potential impact of substance use as it relates to transplant candidacy and is aware of possible random substance screening.  Substance abstinence/cessation counseling, education and resources provided and reviewed.     Arrests/DWI/Treatment/Rehab: patient denies    Psychiatric History:    Mental Health:  Patient states she experiences some low episodes , however she states she is able to recover fairly rapidly.  Psychiatrist/Counselor: denied  Medications:  denied  Suicide/Homicide Issues: Pt denies current or past suicidal/homicidal ideation.   Safety at home: Pt denies any home safety concerns.     Knowledge: Patient and Caregiver states having clear understanding and realistic expectations regarding the potential risks and potential benefits of organ transplantation and organ donation and agrees to discuss with health care team members and support system members, as well as to utilize available resources and express questions and/or concerns in order to further facilitate the pt informed decision-making.  Resources and information provided and reviewed.    Patient and Caregiver is aware of [facility] affiliation and/or  partnership with agencies in home health care, LTAC, SNF, [facility], and other hospitals and clinics.    Understanding: Patient and Caregiver reports having a clear understanding of the many lifetime commitments involved with being a transplant recipient, including costs, compliance, medications, lab work, procedures, appointments, concrete and financial planning, preparedness, timely and appropriate communication of concerns, abstinence (ETOH, tobacco, illicit non-prescribed drugs), adherence to all health care team recommendations, support system and caregiver involvement, appropriate and timely resource utilization and follow-through, mental health counseling as needed/recommended, and patient and caregiver responsibilities.  Social Service Handbook, resources and detailed educational information provided and reviewed.  Educational information provided.    Patient and Caregiver also reports current and expected compliance with health care regime and states having a clear understanding of the importance of compliance.      Patient and Caregiver reports a clear understanding that risks and benefits may be involved with organ transplantation and with organ donation.       Patient and Caregiver also reports clear understanding that psychosocial risk factors may affect patient, and include but are not limited to feelings of depression, generalized anxiety, anxiety regarding dependence on others, post traumatic stress disorder, feelings of guilt and other emotional and/or mental concerns, and/or exacerbation of existing mental health concerns.  Detailed resources provided and discussed.      Patient and Caregiver agrees to access appropriate resources in a timely manner as needed and/or as recommended, and to communicate concerns appropriately.  Patient and Caregiver also reports a clear understanding of treatment options available.     Patient and Caregiver received education in a group setting.   reviewed  education, provided additional information, and answered questions.    Feelings or Concerns: Pt states most prominent concerns is regarding finances.      Coping: Identify Patient & Caregiver Strategies to Wall:   1. Currently & Pre-transplant - family and friends support, home improvement projects, beach   2. At the time of surgery - family and friends support,   3. During post-Transplant & Recovery Period - family and friends support,    Goals: avoid dialysis, retire, live the active lifestyle she is accustomed.  Patient referred to Vocational Rehabilitation.    Interview Behavior: Patient and Caregiver presents as alert and oriented x 4, pleasant, good eye contact, well groomed, recall good, concentration/judgement good, average intelligence, calm, communicative, cooperative, and asking and answering questions appropriately.          Transplant Social Work - Candidacy  Assessment/Plan:     Psychosocial Suitability:  Based on psychosocial risk factors, patient presents as low risk, due to has adequate support, financial plan and caregiving plan. .    Recommendations/Additional Comments: recommend fundraising, pt lives locally    Sheridan Soto LCSW

## 2022-11-03 NOTE — PROGRESS NOTES
"   Transplant Nephrology  Kidney Transplant Recipient Evaluation    Referring Physician: Keshav Dozier  Current Nephrologist: Keshav Dozier    Subjective:   CC:  Initial evaluation of kidney transplant candidacy.    HPI:  Ms. Leonard is a 56 y.o. year old White female who has presented to be evaluated as a potential kidney transplant recipient.  She has advanced kidney disease secondary to ?FSGS primary or most likely secondary, unclear at the present time.   Patient is currently pre-dialysis. She has no dialysis access.     Diagnosed with CKD in August 2006. See below progression of CKD. She has an hand typed copy on her cell phone of her biopsy report with only the impression. No IF or EM: Biopsy showed: glomerulomegaly mild to moderate, global glomerulosclerosis (1/6) small focus of tubular atrophy, atrophics medullary tissue, mild to moderate     She also progression of proteinuria.    She feels fine, tired, working full time, predialysis, no dialysis access, no identifiable donors at the present time     No chest pain or SOB. Appetite is good. No nausea vomit or diarrhea. No ankle edema. She is here with a friend. She is . Motivated with a kidney transplant.    I spoke at length about her diagnosis of ??FSGS primary versus secondary due to her morbid obesity. Her current BMI is 38.6. she is aware that regardless of the etiology of her advanced kidney disease, is advisable to lose wt. We discussed medical wt management versus gastric sleeve which provides more predictable outcomes compared with other bariatric surgery procedures (less dehydration, less risk for oxalosis kidney stones and more predictable absorption of IS meds)    She denied h/o hypertension. Never received IS therapy for her "FSGS". However she states that in the past she was put on a "medication to slow down the progression of the kidney problem" but due to low BP medicine was discontinued.    She also has h/o Sleep Apnea but " never used the CPAP machine because it was recalled.    Patient report h/o mitral valve prolapse. Apparently she is following with cardiology. She has a 2D echo in August 2022, so she does no need other 2D echo. I told the clinic nurse to notify coordinator to cancel this study.             Current Outpatient Medications on File Prior to Visit   Medication Sig Dispense Refill    calcitRIOL (ROCALTROL) 0.5 MCG Cap Take 1 capsule (0.5 mcg total) by mouth once daily. 90 capsule 1    clotrimazole-betamethasone 1-0.05% (LOTRISONE) cream Apply 1 g topically 2 (two) times daily.      omeprazole (PRILOSEC) 20 MG capsule Take 1 capsule (20 mg total) by mouth once daily. 90 capsule 1    pravastatin (PRAVACHOL) 10 MG tablet TAKE 1 TABLET BY MOUTH EVERY DAY 90 tablet 1     No current facility-administered medications on file prior to visit.      8/2022:     CONCLUSIONS     Normal left ventricular cavity size.     Increased left ventricular wall thickness.     Calculated left ventricular ejection fraction by 2D tracking is 67%.     Grade I diastolic dysfunction (abnormal relaxation filling pattern), normal to mildly elevated filling pressures.     Normal left atrial size.     Mild mitral valve regurgitation.     Structurally normal trileaflet aortic valve.     Trace to mild tricuspid valve regurgitation.     PAP smear done 2 weeks ago    Mammogram ordered but not scheduled     Colonoscopy was around <5 years ago.    ST and 2 echo pending she had a 2D echo done      Previous Transplant: no    Past Medical and Surgical History: Ms. Leonard  has a past medical history of Abnormal Pap smear, Chronic kidney disease, Depression, GERD (gastroesophageal reflux disease), Mitral valve insufficiency, Mitral valve prolapse, Obesity, Sleep apnea, and Thyroid disease.  She has a past surgical history that includes Tubal ligation; Foot surgery; Cervical biopsy w/ loop electrode excision; Bunionectomy; Examination under anesthesia (N/A,  "10/22/2021); and Dilation of cervix (N/A, 10/22/2021).    Past Social and Family History: Ms. Leonard reports that she quit smoking about 11 years ago. Her smoking use included cigarettes. She has a 10.00 pack-year smoking history. She has never used smokeless tobacco. She reports that she does not currently use alcohol after a past usage of about 3.0 - 4.0 standard drinks per week. She reports that she does not use drugs. Her family history includes Bipolar disorder in her daughter; Diabetes in her maternal uncle; Heart attack in her maternal grandfather, maternal uncle, and mother; Hypertension in her mother; No Known Problems in her father.    Review of Systems  Skin: no skin rash  CNS; no headaches, blurred vision, seizure, or syncope  ENT: No JVD,  Adenopathies,  nasal congestion. No oral lesions  Cardiac: No chest pain, dyspnea, claudication, edema or palpitations  Respiratory: No SOB, cough, hemoptysis   Gastro-intestinal: No diarrhea, constipation, abdominal pain, nausea, vomit. No ascitis  Genitourinary: no hematuria, dysuria, frequency, frequency  Musculoskeletal: joint pain, arthritis or vasculitic changes  Psych: alert awake, oriented, No cranial nerves deficit.      Objective:   Blood pressure (!) 143/67, pulse 67, temperature 97.3 °F (36.3 °C), temperature source Temporal, resp. rate 18, height 5' 2.8" (1.595 m), weight 98.3 kg (216 lb 11.4 oz), SpO2 99 %.body mass index is 38.64 kg/m².    Physical Exam    Head: normocephalic  Neck: No JVD, cervical axillary, or femoral adenopathies  Heart: no murmurs, Normal s1 and s2, No gallops, no rubs, No murmurs  Lungs; CTA, good respiratory effort, no crackles  Abdomen: soft, non tender, no splenomegaly or hepatomegaly, no massess, no bruits. Abdominal obesity.  Extremities: No edema, skin rash, joint pain  SNC: awake, alert oriented. Cranial nerves are intact, no focalized, sensitivity and strength preserved      Labs:  Lab Results   Component Value Date    " WBC 4.31 11/03/2022    HGB 10.3 (L) 11/03/2022    HCT 32.3 (L) 11/03/2022     11/03/2022    K 3.7 11/03/2022     11/03/2022    CO2 23 11/03/2022    BUN 40 (H) 11/03/2022    CREATININE 2.8 (H) 11/03/2022    EGFRNONAA 21.8 (A) 04/06/2022    CALCIUM 9.2 11/03/2022    PHOS 3.6 11/03/2022    ALBUMIN 3.5 11/03/2022    AST 16 11/03/2022    ALT 11 11/03/2022    UTPCR 5.38 (H) 08/02/2022    .5 (H) 11/03/2022       Lab Results   Component Value Date    BILIRUBINUA Negative 03/12/2021    PROTEINUA 2+ (A) 03/12/2021    NITRITE Negative 03/12/2021    RBCUA 1 03/12/2021    WBCUA 27 (H) 03/12/2021       No results found for: HLAABCTYPE    Labs were reviewed with the patient.    Assessment:     1. Mitral valve prolapse    2. CKD (chronic kidney disease) stage 4, GFR 15-29 ml/min    3. FSGS (focal segmental glomerulosclerosis)    4. Secondary renal hyperparathyroidism    5. BMI 36.0-36.9,adult        Plan:     Transplant Candidacy:   Based on available information, Ms. Leonard is a suitable kidney transplant candidate.   Meets center eligibility for accepting HCV+ donor offer - yes.  Patient educated on HCV+ donors. Jacqui is willing to accept HCV+ donor offer - yes   Patient is a candidate for KDPI > 85 kidney donor offer - no.  Final determination of transplant candidacy will be made once workup is complete and reviewed by the selection committee.    We spoke at length about kidney transplant and FSGS primary and secondary    I discussed recurrence of this condition and need for kidney biopsies and therapies. We discussed in detail progression of her CKD based on data available in Epic which goes back to 2009, also we discussed worsening of proteinuria. These issues will be addressed by her providers    We discussed secondary forms of FSGS and features of her biopsy suspicious for secondary causes (global sclerosis and glomerulomegaly associated more likely with obesity and metabolic syndrome)    We  discussed routine evaluation tests and diagnostics. Nature of the waiting list. Presence of proteinuria. Waiting time and living donation. I explained in detail Hep C kidneys. She is agreeable with these kidneys.     Patient advised that it is recommended that all transplant candidates, and their close contacts and household members receive Covid vaccination.    Extensive education provided    All questions answered. We spoke for almost 1 hr and 5 minutes.    Mani Flowers MD       [facility] Patient Status  Functional Status: 70% - Cares for self: unable to carry on normal activity or active work  Physical Capacity: No Limitations

## 2022-11-03 NOTE — PROGRESS NOTES
"TRANSPLANT NUTRITIONAL ASSESSMENT    Referring Provider: Keshav Dozier MD     Reason for Visit: Pre-kidney transplant work-up (pre-dialysis)    Age: 56 y.o.  Sex: female    Patient Active Problem List   Diagnosis    CKD (chronic kidney disease) stage 3, GFR 30-59 ml/min    Persistent proteinuria    BMI 36.0-36.9,adult    Acquired cyst of kidney    Mass of uterine cervix    Pre-op testing    Hematocolpos    COVID    FSGS (focal segmental glomerulosclerosis)    Secondary renal hyperparathyroidism    CKD (chronic kidney disease) stage 4, GFR 15-29 ml/min    Mitral valve prolapse     Past Medical History:   Diagnosis Date    Abnormal Pap smear     Chronic kidney disease     - Stage 3    Depression     GERD (gastroesophageal reflux disease)     Mitral valve insufficiency     Mitral valve prolapse     Obesity     Sleep apnea     Thyroid disease      Lab Results   Component Value Date    GLU 81 11/03/2022    K 3.7 11/03/2022    PHOS 3.6 11/03/2022    CHOL 192 11/03/2022    HDL 59 11/03/2022    TRIG 68 11/03/2022    ALBUMIN 3.5 11/03/2022    CALCIUM 9.2 11/03/2022     Other Pertinent Labs: N/A    Current Outpatient Medications   Medication Sig    calcitRIOL (ROCALTROL) 0.5 MCG Cap Take 1 capsule (0.5 mcg total) by mouth once daily.    clotrimazole-betamethasone 1-0.05% (LOTRISONE) cream Apply 1 g topically 2 (two) times daily.    omeprazole (PRILOSEC) 20 MG capsule Take 1 capsule (20 mg total) by mouth once daily.    pravastatin (PRAVACHOL) 10 MG tablet TAKE 1 TABLET BY MOUTH EVERY DAY     No current facility-administered medications for this visit.     Allergies: Patient has no known allergies.    Ht Readings from Last 1 Encounters:   11/03/22 5' 2.8" (1.595 m)     Wt Readings from Last 1 Encounters:   11/03/22 98.3 kg (216 lb 11.4 oz)      BMI: Body mass index is 38.64 kg/m².    Usual Weight: ~210 lbs  Weight Change/Time: stable   Current Diet: watches phos and K   Appetite/Current Intake: good   Exercise/Physical " Activity: functional in ADLs  Nutritional/Herbal Supplements: none  Chewing/Swallowing Problems: none  Symptoms: constipation occasionally     Estimated Kcal Need: 1966 kcal/day (20 kcal/kg)   Estimated Protein Need: 78 gm/day (0.8 gm/kg)     Nutritional History:   Pt and caregiver present. Pt does not participate in formal exercise at this, but plans to join a gym and a [employer] class soon.     Diet Recall    Morning: fruit (apple), egg whites     Midday: rouse's or whole salad bar sometimes with baked chicken     Evening: leftovers from lunch                  Enjoys chicken, fish, snap beans, bell peppers, mushrooms, broccoli, cauliflower, asparagus, brussels sprout     Snacks: fruit (pear, watermelon, grapes, cherries, pineapple)     Desserts: none    Beverages: 4-6 16.9 oz bottles of water/day, sprite         Seasonings: herbs and spices, pepper     Cooking Methods: bake, broil, boil     Restaurants/Fast Food: often, usually chooses chicken, fish or salad     Nutritional Diagnoses  Problem: food- and nutrition-related knowledge deficit  Etiology: lack of previous education on pre-kidney transplant nutrition recommendations  Symptoms: diet recall and questions from pt     Educational Need? yes  Barriers: none identified  Discussed with: patient and caregiver  Interventions: Patient taught nutrition information regarding Pre-kidney transplant work-up (pre-dialysis). Renal Nutrition Therapy packet reviewed (high/low food sources of K, Phos and protein, low sodium and fluid intake, emphasis on moderate protein intake). Encouraged physical activity daily, regular exercise as tolerated, stay mobile.  Goals/Recommendations: diet adherence  Actions Taken: instruct/provide written information  Patient and/or family comprehend instructions: yes  Outcome: Verbalizes understanding  Monitoring: Contact information provided. Will follow-up in clinic and communicate with the care team as needed.     Counseling Time: 20 minutes

## 2022-11-03 NOTE — PROGRESS NOTES
PHARM.D. PRE-TRANSPLANT NOTE:    This patient's medication therapy was evaluated as part of her pre-transplant evaluation.      The following general pharmacologic concerns were noted: None; consider changing pravastatin to atorvastain     The following concerns for post-operative pain management were noted: None     The following pharmacologic concerns related to HCV therapy were noted: None       This patient's medication profile was reviewed for considerations for DAA Hepatitis C therapy:    [X]  No current inducers of CYP 3A4 or PGP  [X]  No amiodarone on this patient's EMR profile in the last 24 months  [X]  No past or current atrial fibrillation on this patient's EMR profile       Current Outpatient Medications   Medication Sig Dispense Refill    calcitRIOL (ROCALTROL) 0.5 MCG Cap Take 1 capsule (0.5 mcg total) by mouth once daily. 90 capsule 1    clotrimazole-betamethasone 1-0.05% (LOTRISONE) cream Apply 1 g topically 2 (two) times daily.      omeprazole (PRILOSEC) 20 MG capsule Take 1 capsule (20 mg total) by mouth once daily. 90 capsule 1    pravastatin (PRAVACHOL) 10 MG tablet TAKE 1 TABLET BY MOUTH EVERY DAY 90 tablet 1     No current facility-administered medications for this visit.           I am available for consultation and can be contacted, as needed by the other members of the Transplant team.

## 2022-11-03 NOTE — PROGRESS NOTES
INITIAL PATIENT EDUCATION NOTE    Ms. Jacqui Leonard was seen in pre-kidney transplant clinic for evaluation for kidney, kidney/pancreas or pancreas only transplant.  The patient attended a an individual video education session that discussed/reviewed the following aspects of transplantation: evaluation and selection committee process, UNOS waitlist management/multiple listings, types of organs offered (KDPI < 85%, KDPI > 85%, PHS risk, DCD, HCV+, HIV+ for HIV+ recipients and enbloc/dual), financial aspects, surgical procedures, dietary instruction pre- and post-transplant, health maintenance pre- and post-transplant, post-transplant hospitalization and outpatient follow-up, potential to participate in a research protocol, and medication management and side effects.  A question and answer session was provided after the presentation.    The patient was seen by all members of the multi-disciplinary team to include: Nephrologist/PA, Surgeon, , Transplant Coordinator, , Pharmacist and Dietician (if applicable).    The patient reviewed and signed all consents for evaluation which were witnessed and sent to scanning into the [facility] chart.    The patient was given an education book and plan for further evaluation based on her individual assessment.      Reviewed program requirement for complete COVID vaccination with documentation prior to listing.  COVID education information reviewed with patient. Patient encouraged to be up to date on all vaccinations.       The patient was informed that the transplant team would manage immediate post op pain. If the patient requires long term pain management, they will need to have that pain management addressed by their PCP or previous provider who wrote for long term pain medicines.    The patient was encouraged to call with any questions or concerns.

## 2022-11-08 NOTE — PROGRESS NOTES
Transplant Surgery  Kidney Transplant Recipient Evaluation    Referring Physician: Keshav Dozier  Current Nephrologist: Keshav Dozier    Subjective:     Reason for Visit: evaluate transplant candidacy    History of Present Illness: Jacqui Leonard is a 56 y.o. year old female undergoing transplant evaluation.    Dialysis History: Jacqui is pre-dialysis.      Transplant History: N/A    Etiology of Renal Disease: Focal Glomerular Sclerosis (Focal Segmental - FSG) (based on medical records from referral).    External provider notes reviewed: Yes    Review of Systems   Constitutional:  Positive for activity change and fatigue.   Respiratory: Negative.     Cardiovascular: Negative.    Gastrointestinal: Negative.    Genitourinary: Negative.    Objective:     Physical Exam:  Constitutional:   Vitals reviewed: yes   Well-nourished and well-groomed: yes  Eyes:   Sclerae icteric: no   Extraocular movements intact: yes  GI:    Bowel sounds normal: yes   Tenderness: no    If yes, quadrant/location: not applicable   Palpable masses: no    If yes, quadrant/location: not applicable   Hepatosplenomegaly: no   Ascites: no   Hernia: no    If yes, type/location: not applicable   Surgical scars: no    If yes, type/location: not applicable  Resp:   Effort normal: yes   Breath sounds normal: yes    CV:   Regular rate and rhythm: yes   Heart sounds normal: yes   Femoral pulses normal: yes   Extremities edematous: no  Skin:   Rashes or lesions present: no    If yes, describe:not applicable   Jaundice:: no    Musculoskeletal:   Gait normal: yes   Strength normal: yes  Psych:   Oriented to person, place, and time: yes   Affect and mood normal: yes    Additional comments: not applicable    Diagnostics:  The following labs have been reviewed: CBC  CMP  PT  INR  The following radiology images have been independently reviewed and interpreted: Pelvic Xray  Iliac doppler    Counseling: We provided Jacqui Leonard with a group  education session today.  We discussed kidney transplantation at length with her, including risks, potential complications, and alternatives in the management of her renal failure.  The discussion included complications related to anesthesia, bleeding, infection, primary nonfunction, and ATN.  I discussed the typical postoperative course, length of hospitalization, the need for long-term immunosuppression, and the need for long-term routine follow-up.  I discussed living-donor and -donor transplantation and the relative advantages and disadvantages of each.  I also discussed average waiting times for both living donation and  donation.  I discussed national and center-specific survival rates.  I also mentioned the potential benefit of multicenter listing to candidates listed with centers within more than one organ procurement organization.  All questions were answered.    Patient advised that it is recommended that all transplant candidates, and their close contacts and household members receive Covid vaccination.    Final determination of transplant candidacy will be made once evaluation is complete and reviewed by the Kidney & Kidney/Pancreas Selection Committee.    Coronavirus disease (COVID-19) caused by severe acute respiratory virus coronavirus 2 (SARS-C0V 2) is associated with increased mortality in solid organ transplant recipients (SOT) compared to non-transplant patients. Vaccine responses to vaccination are depressed against SARS-CoV2 compared to normal individuals but improve with third vaccination doses. Vaccination prior to SOT provides both the best opportunity for transplant candidates to develop protective immunity and to reduce the risk of serious COVID19 infections post transplantation. Organ transplant candidates at [facility] will be required to receive SARS-CoV-2 vaccination prior to being listed with a an active status, whenever possible.  Exceptions will be made for disability related reasons or for sincerely held Worship beliefs.          Transplant Surgery - Candidacy   Assessment/Plan:   Jacqui Leonard is pre-dialysis with CKD stage 4 (GFR 15-29 mL/min). I see no surgical contraindication to placing a kidney transplant. Based on available information, Jacqui Leonard is a suitable kidney transplant candidate.     Abdominal Habitus: Large habitus, BMI 38.6. Not absolutely contraindicated. Patient instructed on increased risks associated with BMI and body habitus and encouraged to lose weight.     Vascular/Technical Concerns: None based on exam or available imaging at this time.  No history of vascular disease, no femoral catheters, no lower extremity grafts, no iliofemoral venous thromboembolism. The patient is not on anticoagulation.     Urologic Concerns: None based on available information. Patient reports normal volume of urine per day.  No history of recurrent UTI, no apparent obstructive symptoms or voiding dysfunction, no history of self catheterization    Other surgical considerations/concerns:  None     Additional testing to be completed according to the Written Order Guidelines for Adult Pre-kidney and Pancreas Transplant Evaluation (KI-02).  Interpretation of tests and discussion of patient management involves all members of the multidisciplinary transplant team.    Khanh Brito MD

## 2022-11-16 DIAGNOSIS — Z91.89 CARDIOVASCULAR EVENT RISK: ICD-10-CM

## 2022-11-16 DIAGNOSIS — Z01.810 HIGH RISK SURGERY, PRE-OPERATIVE CARDIOVASCULAR EXAMINATION: ICD-10-CM

## 2022-11-16 DIAGNOSIS — I25.10 CORONARY ARTERY DISEASE, UNSPECIFIED VESSEL OR LESION TYPE, UNSPECIFIED WHETHER ANGINA PRESENT, UNSPECIFIED WHETHER NATIVE OR TRANSPLANTED HEART: Primary | ICD-10-CM

## 2022-11-16 DIAGNOSIS — Z76.82 ORGAN TRANSPLANT CANDIDATE: ICD-10-CM

## 2022-11-17 DIAGNOSIS — Z76.82 ORGAN TRANSPLANT CANDIDATE: Primary | ICD-10-CM

## 2022-12-01 ENCOUNTER — TELEPHONE (OUTPATIENT)
Dept: TRANSPLANT | Facility: CLINIC | Age: 57
End: 2022-12-01
Payer: COMMERCIAL

## 2022-12-01 NOTE — TELEPHONE ENCOUNTER
Spoke with the patient; Scheduled Stress Test and Cardiology ([facility] [place] 12/28); Patient confirmed appointment date time and location. The patient have no further questions or concerns at this time. Reminder letter sent. Mammogram (Scheduled 12/19 [facility] [place])

## 2022-12-19 ENCOUNTER — APPOINTMENT (OUTPATIENT)
Dept: RADIOLOGY | Facility: OTHER | Age: 57
End: 2022-12-19
Attending: OBSTETRICS & GYNECOLOGY
Payer: COMMERCIAL

## 2022-12-19 DIAGNOSIS — Z12.31 VISIT FOR SCREENING MAMMOGRAM: ICD-10-CM

## 2022-12-19 PROCEDURE — 77063 BREAST TOMOSYNTHESIS BI: CPT | Mod: TC,PN,TXP

## 2022-12-19 PROCEDURE — 77063 MAMMO DIGITAL SCREENING BILAT WITH TOMO: ICD-10-PCS | Mod: 26,TXP,, | Performed by: RADIOLOGY

## 2022-12-19 PROCEDURE — 77063 BREAST TOMOSYNTHESIS BI: CPT | Mod: 26,TXP,, | Performed by: RADIOLOGY

## 2022-12-19 PROCEDURE — 77067 MAMMO DIGITAL SCREENING BILAT WITH TOMO: ICD-10-PCS | Mod: 26,TXP,, | Performed by: RADIOLOGY

## 2022-12-19 PROCEDURE — 77067 SCR MAMMO BI INCL CAD: CPT | Mod: 26,TXP,, | Performed by: RADIOLOGY

## 2022-12-28 ENCOUNTER — PATIENT MESSAGE (OUTPATIENT)
Dept: CARDIOLOGY | Facility: CLINIC | Age: 57
End: 2022-12-28

## 2022-12-28 ENCOUNTER — HOSPITAL ENCOUNTER (OUTPATIENT)
Dept: CARDIOLOGY | Facility: HOSPITAL | Age: 57
Discharge: HOME | End: 2022-12-28
Attending: NURSE PRACTITIONER
Payer: COMMERCIAL

## 2022-12-28 ENCOUNTER — HOSPITAL ENCOUNTER (OUTPATIENT)
Dept: CARDIOLOGY | Facility: HOSPITAL | Age: 57
Discharge: HOME | End: 2022-12-28
Attending: INTERNAL MEDICINE
Payer: COMMERCIAL

## 2022-12-28 ENCOUNTER — OFFICE VISIT (OUTPATIENT)
Dept: CARDIOLOGY | Facility: CLINIC | Age: 57
End: 2022-12-28
Payer: COMMERCIAL

## 2022-12-28 VITALS
OXYGEN SATURATION: 96 % | WEIGHT: 215.38 LBS | BODY MASS INDEX: 36.77 KG/M2 | SYSTOLIC BLOOD PRESSURE: 110 MMHG | HEIGHT: 64 IN | HEART RATE: 74 BPM | DIASTOLIC BLOOD PRESSURE: 60 MMHG

## 2022-12-28 VITALS
WEIGHT: 215 LBS | SYSTOLIC BLOOD PRESSURE: 134 MMHG | DIASTOLIC BLOOD PRESSURE: 80 MMHG | HEART RATE: 58 BPM | HEIGHT: 64 IN | BODY MASS INDEX: 36.7 KG/M2

## 2022-12-28 VITALS
BODY MASS INDEX: 39.75 KG/M2 | DIASTOLIC BLOOD PRESSURE: 98 MMHG | HEIGHT: 62 IN | SYSTOLIC BLOOD PRESSURE: 172 MMHG | WEIGHT: 216 LBS | HEART RATE: 61 BPM

## 2022-12-28 DIAGNOSIS — N05.1 FSGS (FOCAL SEGMENTAL GLOMERULOSCLEROSIS): ICD-10-CM

## 2022-12-28 DIAGNOSIS — E78.00 HYPERCHOLESTEREMIA: ICD-10-CM

## 2022-12-28 DIAGNOSIS — I34.0 MILD MITRAL REGURGITATION: ICD-10-CM

## 2022-12-28 DIAGNOSIS — N18.4 CKD (CHRONIC KIDNEY DISEASE) STAGE 4, GFR 15-29 ML/MIN: ICD-10-CM

## 2022-12-28 DIAGNOSIS — Z76.82 ORGAN TRANSPLANT CANDIDATE: ICD-10-CM

## 2022-12-28 DIAGNOSIS — Z01.818 PREOPERATIVE CLEARANCE: Primary | ICD-10-CM

## 2022-12-28 DIAGNOSIS — Z86.79 HISTORY OF MITRAL VALVE PROLAPSE: ICD-10-CM

## 2022-12-28 DIAGNOSIS — E66.01 SEVERE OBESITY (BMI 35.0-35.9 WITH COMORBIDITY): ICD-10-CM

## 2022-12-28 DIAGNOSIS — N25.81 SECONDARY RENAL HYPERPARATHYROIDISM: ICD-10-CM

## 2022-12-28 DIAGNOSIS — Z87.891 EX-SMOKER: ICD-10-CM

## 2022-12-28 LAB
ASCENDING AORTA: 3.24 CM
AV MEAN GRADIENT: 3 MMHG
BSA FOR ECHO PROCEDURE: 2.1 M2
CV ECHO LV RWT: 0.43 CM
CV STRESS BASE HR: 61 BPM
DIASTOLIC BLOOD PRESSURE: 98 MMHG
DOP CALC LVOT AREA: 3.5 CM2
DOP CALC LVOT DIAMETER: 2.11 CM
DOP CALC LVOT PEAK VEL: 0.7 M/S
DOP CALC LVOT STROKE VOLUME: 51.38 CM3
DOP CALCLVOT PEAK VEL VTI: 14.7 CM
E WAVE DECELERATION TIME: 177.47 MSEC
E/A RATIO: 1.49
E/E' RATIO: 8.95 M/S
ECHO LV POSTERIOR WALL: 1.06 CM (ref 0.6–1.1)
EJECTION FRACTION- HIGH: 59 %
EJECTION FRACTION: 63 %
END DIASTOLIC INDEX-HIGH: 155 ML/M2
END DIASTOLIC INDEX-LOW: 91 ML/M2
END SYSTOLIC INDEX-HIGH: 78 ML/M2
END SYSTOLIC INDEX-LOW: 40 ML/M2
FRACTIONAL SHORTENING: 34 % (ref 28–44)
INTERVENTRICULAR SEPTUM: 0.79 CM (ref 0.6–1.1)
LA MAJOR: 4.98 CM
LA MINOR: 5.13 CM
LA WIDTH: 4.08 CM
LEFT ATRIUM SIZE: 4 CM
LEFT ATRIUM VOLUME INDEX MOD: 31.3 ML/M2
LEFT ATRIUM VOLUME INDEX: 34.7 ML/M2
LEFT ATRIUM VOLUME MOD: 63.18 CM3
LEFT ATRIUM VOLUME: 70.11 CM3
LEFT INTERNAL DIMENSION IN SYSTOLE: 3.3 CM (ref 2.1–4)
LEFT VENTRICLE DIASTOLIC VOLUME INDEX: 57.96 ML/M2
LEFT VENTRICLE DIASTOLIC VOLUME: 117.08 ML
LEFT VENTRICLE MASS INDEX: 81 G/M2
LEFT VENTRICLE SYSTOLIC VOLUME INDEX: 21.9 ML/M2
LEFT VENTRICLE SYSTOLIC VOLUME: 44.25 ML
LEFT VENTRICULAR INTERNAL DIMENSION IN DIASTOLE: 4.98 CM (ref 3.5–6)
LEFT VENTRICULAR MASS: 162.93 G
LV LATERAL E/E' RATIO: 8.5 M/S
LV SEPTAL E/E' RATIO: 9.44 M/S
MV A" WAVE DURATION": 10.85 MSEC
MV PEAK A VEL: 0.57 M/S
MV PEAK E VEL: 0.85 M/S
MV STENOSIS PRESSURE HALF TIME: 51.46 MS
MV VALVE AREA P 1/2 METHOD: 4.28 CM2
NUC REST DIASTOLIC VOLUME INDEX: 70
NUC REST EJECTION FRACTION: 65
NUC REST SYSTOLIC VOLUME INDEX: 25
NUC STRESS DIASTOLIC VOLUME INDEX: 66
NUC STRESS EJECTION FRACTION: 67 %
NUC STRESS SYSTOLIC VOLUME INDEX: 22
OHS CV CPX 1 MINUTE RECOVERY HEART RATE: 113 BPM
OHS CV CPX 85 PERCENT MAX PREDICTED HEART RATE MALE: 132
OHS CV CPX MAX PREDICTED HEART RATE: 155
OHS CV CPX PATIENT IS FEMALE: 1
OHS CV CPX PATIENT IS MALE: 0
OHS CV CPX PEAK DIASTOLIC BLOOD PRESSURE: 90 MMHG
OHS CV CPX PEAK HEAR RATE: 91 BPM
OHS CV CPX PEAK RATE PRESSURE PRODUCT: NORMAL
OHS CV CPX PEAK SYSTOLIC BLOOD PRESSURE: 186 MMHG
OHS CV CPX PERCENT MAX PREDICTED HEART RATE ACHIEVED: 59
OHS CV CPX RATE PRESSURE PRODUCT PRESENTING: NORMAL
PULM VEIN S/D RATIO: 1.06
PV PEAK D VEL: 0.33 M/S
PV PEAK S VEL: 0.35 M/S
RA MAJOR: 4.6 CM
RA PRESSURE: 3 MMHG
RA WIDTH: 3.26 CM
RETIRED EF AND QEF - SEE NOTES: 47 %
RIGHT VENTRICULAR END-DIASTOLIC DIMENSION: 4.21 CM
RV TISSUE DOPPLER FREE WALL SYSTOLIC VELOCITY 1 (APICAL 4 CHAMBER VIEW): 8.69 CM/S
SINUS: 2.68 CM
STJ: 2.49 CM
SYSTOLIC BLOOD PRESSURE: 172 MMHG
TDI LATERAL: 0.1 M/S
TDI SEPTAL: 0.09 M/S
TDI: 0.1 M/S
TRICUSPID ANNULAR PLANE SYSTOLIC EXCURSION: 1.86 CM

## 2022-12-28 PROCEDURE — A9502 TC99M TETROFOSMIN: HCPCS | Mod: TXP

## 2022-12-28 PROCEDURE — 93018 STRESS TEST WITH MYOCARDIAL PERFUSION (CUPID ONLY): ICD-10-PCS | Mod: TXP,,, | Performed by: INTERNAL MEDICINE

## 2022-12-28 PROCEDURE — 93306 TTE W/DOPPLER COMPLETE: CPT | Mod: 26,TXP,, | Performed by: INTERNAL MEDICINE

## 2022-12-28 PROCEDURE — 93880 CV US DOPPLER CAROTID (CUPID ONLY): ICD-10-PCS | Mod: 26,TXP,, | Performed by: INTERNAL MEDICINE

## 2022-12-28 PROCEDURE — 63600175 PHARM REV CODE 636 W HCPCS: Mod: TXP | Performed by: NURSE PRACTITIONER

## 2022-12-28 PROCEDURE — 93306 TTE W/DOPPLER COMPLETE: CPT | Mod: TXP

## 2022-12-28 PROCEDURE — 93306 ECHO (CUPID ONLY): ICD-10-PCS | Mod: 26,TXP,, | Performed by: INTERNAL MEDICINE

## 2022-12-28 PROCEDURE — 99204 PR OFFICE/OUTPT VISIT, NEW, LEVL IV, 45-59 MIN: ICD-10-PCS | Mod: S$GLB,TXP,, | Performed by: INTERNAL MEDICINE

## 2022-12-28 PROCEDURE — 93016 CV STRESS TEST SUPVJ ONLY: CPT | Mod: TXP,,, | Performed by: INTERNAL MEDICINE

## 2022-12-28 PROCEDURE — 93018 CV STRESS TEST I&R ONLY: CPT | Mod: TXP,,, | Performed by: INTERNAL MEDICINE

## 2022-12-28 PROCEDURE — 99999 PR PBB SHADOW E&M-EST. PATIENT-LVL IV: ICD-10-PCS | Mod: PBBFAC,TXP,, | Performed by: INTERNAL MEDICINE

## 2022-12-28 PROCEDURE — 78452 HT MUSCLE IMAGE SPECT MULT: CPT | Mod: 26,TXP,, | Performed by: INTERNAL MEDICINE

## 2022-12-28 PROCEDURE — 99999 PR PBB SHADOW E&M-EST. PATIENT-LVL IV: CPT | Mod: PBBFAC,TXP,, | Performed by: INTERNAL MEDICINE

## 2022-12-28 PROCEDURE — 93880 EXTRACRANIAL BILAT STUDY: CPT | Mod: TXP

## 2022-12-28 PROCEDURE — 93016 STRESS TEST WITH MYOCARDIAL PERFUSION (CUPID ONLY): ICD-10-PCS | Mod: TXP,,, | Performed by: INTERNAL MEDICINE

## 2022-12-28 PROCEDURE — 78452 STRESS TEST WITH MYOCARDIAL PERFUSION (CUPID ONLY): ICD-10-PCS | Mod: 26,TXP,, | Performed by: INTERNAL MEDICINE

## 2022-12-28 PROCEDURE — 93880 EXTRACRANIAL BILAT STUDY: CPT | Mod: 26,TXP,, | Performed by: INTERNAL MEDICINE

## 2022-12-28 PROCEDURE — 99204 OFFICE O/P NEW MOD 45 MIN: CPT | Mod: S$GLB,TXP,, | Performed by: INTERNAL MEDICINE

## 2022-12-28 RX ORDER — PRAVASTATIN SODIUM 40 MG/1
40 TABLET ORAL DAILY
Qty: 90 TABLET | Refills: 3 | Status: SHIPPED | OUTPATIENT
Start: 2022-12-28 | End: 2023-01-11

## 2022-12-28 RX ORDER — CAFFEINE CITRATE 20 MG/ML
60 SOLUTION INTRAVENOUS
Status: COMPLETED | OUTPATIENT
Start: 2022-12-28 | End: 2022-12-28

## 2022-12-28 RX ORDER — REGADENOSON 0.08 MG/ML
0.4 INJECTION, SOLUTION INTRAVENOUS ONCE
Status: COMPLETED | OUTPATIENT
Start: 2022-12-28 | End: 2022-12-28

## 2022-12-28 RX ADMIN — REGADENOSON 0.4 MG: 0.08 INJECTION, SOLUTION INTRAVENOUS at 08:12

## 2022-12-28 RX ADMIN — CAFFEINE CITRATE 60 MG: 20 INJECTION INTRAVENOUS at 09:12

## 2022-12-28 NOTE — PROGRESS NOTES
Kenzie CHAS Buchtel   [id number] [facility] [address]         10/16/2017       Dear Ms. Moe:    According to our records, it is time to schedule your yearly physical appointment with Dr Jones.    Please contact the office schedule this appointment. A yearly visit is required for further refills of medications.       Sincerely,        Dr. Gene Jones  [address] [place] [address] [phone]           Subjective:   Patient ID:  Jacqui Leonard is a 57 y.o. female is a new patient who presents for evaluation of Referral  Pre-op clearance, CKD IV Focal Glomerular Sclerosis (Focal Segmental - FSG)     The 10-year ASCVD risk score (Xiomy JOSUE, et al., 2019) is: 1.8%    Values used to calculate the score:      Age: 57 years      Sex: Female      Is Non- : No      Diabetic: No      Tobacco smoker: No      Systolic Blood Pressure: 110 mmHg      Is BP treated: No      HDL Cholesterol: 59 mg/dL      Total Cholesterol: 192 mg/dL    HPI:   Mother had heart failure at 63 and grandfather had massive MI in 50s and   Ex smoker 1/2 ppd x 35  yrs  No chest pain, Orthopnea, PND of heart failure symptoms.   occasional palpitations or fluttering in the chest that makes her feel SOB   She has been told she has MV prolapse    Echo       Normal left ventricular cavity size.     Increased left ventricular wall thickness.     Calculated left ventricular ejection fraction by 2D tracking is 67%.     Grade I diastolic dysfunction (abnormal relaxation filling pattern), normal to mildly elevated filling pressures.     Normal left atrial size.     Mild mitral valve regurgitation.     Structurally normal trileaflet aortic valve.     Trace to mild tricuspid valve regurgitation.    Echo   NSR 67 bpm     Normal right heart size     Mild TR / PAsys ~ 24 mmHg     LA appears mildly enlarged / LAD 44 mm     Difficult assess MV anatomy - anterior MVL appears to billow     MR - moderate  /posteriorly directed     Mild LVH / EF 58%      Normal diastolic parameters     Normal aortic valve     No AS and no AR     No pericardial effusion         Patient Active Problem List   Diagnosis    CKD (chronic kidney disease) stage 3, GFR 30-59 ml/min    Persistent proteinuria    BMI 36.0-36.9,adult    Acquired cyst of kidney    Mass of uterine cervix    Pre-op testing    Hematocolpos    COVID    FSGS (focal segmental  "glomerulosclerosis)    Secondary renal hyperparathyroidism    CKD (chronic kidney disease) stage 4, GFR 15-29 ml/min    Mitral valve prolapse    Preoperative clearance    Severe obesity (BMI 35.0-35.9 with comorbidity)    Organ transplant candidate     /60   Pulse 74   Ht 5' 4" (1.626 m)   Wt 97.7 kg (215 lb 6.2 oz)   SpO2 96%   BMI 36.97 kg/m²   Body mass index is 36.97 kg/m².  CrCl cannot be calculated (Patient's most recent lab result is older than the maximum 7 days allowed.).    Lab Results   Component Value Date     11/03/2022    K 3.7 11/03/2022     11/03/2022    CO2 23 11/03/2022    BUN 40 (H) 11/03/2022    CREATININE 2.8 (H) 11/03/2022    GLU 81 11/03/2022    AST 16 11/03/2022    ALT 11 11/03/2022    ALBUMIN 3.5 11/03/2022    PROT 6.9 11/03/2022    BILITOT 0.3 11/03/2022    WBC 4.31 11/03/2022    HGB 10.3 (L) 11/03/2022    HCT 32.3 (L) 11/03/2022    MCV 91 11/03/2022     11/03/2022    INR 0.9 11/03/2022    TSH 2.150 05/07/2021    CHOL 192 11/03/2022    HDL 59 11/03/2022    LDLCALC 119.4 11/03/2022    TRIG 68 11/03/2022       Current Outpatient Medications   Medication Sig    calcitRIOL (ROCALTROL) 0.5 MCG Cap Take 1 capsule (0.5 mcg total) by mouth once daily.    clotrimazole-betamethasone 1-0.05% (LOTRISONE) cream Apply 1 g topically 2 (two) times daily.    omeprazole (PRILOSEC) 20 MG capsule Take 1 capsule (20 mg total) by mouth once daily.    pravastatin (PRAVACHOL) 40 MG tablet Take 1 tablet (40 mg total) by mouth once daily.     No current facility-administered medications for this visit.       Review of Systems   Constitutional: Negative for chills, decreased appetite, malaise/fatigue, night sweats, weight gain and weight loss.   Eyes:  Negative for blurred vision, double vision, visual disturbance and visual halos.   Cardiovascular:  Negative for chest pain, claudication, cyanosis, dyspnea on exertion, irregular heartbeat, leg swelling, near-syncope, orthopnea, " palpitations, paroxysmal nocturnal dyspnea and syncope.   Respiratory:  Negative for cough, hemoptysis, snoring, sputum production and wheezing.    Endocrine: Negative for cold intolerance, heat intolerance, polydipsia and polyphagia.   Hematologic/Lymphatic: Negative for adenopathy and bleeding problem. Does not bruise/bleed easily.   Skin:  Negative for flushing, itching, poor wound healing and rash.   Musculoskeletal:  Negative for arthritis, back pain, falls, gout, joint pain, joint swelling, muscle cramps, muscle weakness, myalgias, neck pain and stiffness.   Gastrointestinal:  Negative for bloating, abdominal pain, anorexia, diarrhea, dysphagia, excessive appetite, flatus, hematemesis, jaundice, melena and nausea.   Genitourinary:  Negative for hesitancy and incomplete emptying.   Neurological:  Negative for aphonia, brief paralysis, difficulty with concentration, disturbances in coordination, excessive daytime sleepiness, dizziness, focal weakness, light-headedness, loss of balance and weakness.   Psychiatric/Behavioral:  Negative for altered mental status, depression, hallucinations, hypervigilance, memory loss, substance abuse and suicidal ideas. The patient is nervous/anxious (about health). The patient does not have insomnia.      Objective:   Physical Exam  Constitutional:       General: She is not in acute distress.     Appearance: She is well-developed. She is not diaphoretic.   HENT:      Head: Normocephalic and atraumatic.      Nose: Nose normal.      Mouth/Throat:      Pharynx: No oropharyngeal exudate.   Eyes:      General: No scleral icterus.        Right eye: No discharge.         Left eye: No discharge.      Conjunctiva/sclera: Conjunctivae normal.      Pupils: Pupils are equal, round, and reactive to light.   Neck:      Thyroid: No thyromegaly.      Vascular: No JVD.      Trachea: No tracheal deviation.   Cardiovascular:      Rate and Rhythm: Normal rate and regular rhythm.      Pulses: Intact  distal pulses.      Heart sounds: Normal heart sounds. No murmur heard.    No friction rub. No gallop.   Pulmonary:      Effort: Pulmonary effort is normal. No respiratory distress.      Breath sounds: Normal breath sounds. No stridor. No wheezing or rales.   Chest:      Chest wall: No tenderness.   Abdominal:      General: Bowel sounds are normal. There is no distension.      Palpations: Abdomen is soft. There is no mass.      Tenderness: There is no abdominal tenderness. There is no guarding or rebound.   Musculoskeletal:         General: No tenderness. Normal range of motion.      Cervical back: Normal range of motion and neck supple.   Lymphadenopathy:      Cervical: No cervical adenopathy.   Skin:     General: Skin is warm.      Coloration: Skin is not pale.      Findings: No erythema or rash.   Neurological:      Mental Status: She is alert and oriented to person, place, and time.      Cranial Nerves: No cranial nerve deficit.      Motor: No abnormal muscle tone.      Coordination: Coordination normal.      Deep Tendon Reflexes: Reflexes are normal and symmetric. Reflexes normal.   Psychiatric:         Behavior: Behavior normal.         Thought Content: Thought content normal.         Judgment: Judgment normal.       Assessment:     1. Preoperative clearance    2. Organ transplant candidate    3. CKD (chronic kidney disease) stage 4, GFR 15-29 ml/min    4. FSGS (focal segmental glomerulosclerosis)    5. Secondary renal hyperparathyroidism    6. Severe obesity (BMI 35.0-35.9 with comorbidity)    7. History of mitral valve prolapse    8. Mild mitral regurgitation    9. Hypercholesteremia    10. Ex-smoker        Plan:   Jacqui was seen today for referral.    Diagnoses and all orders for this visit:    Preoperative clearance    Organ transplant candidate  -     Ambulatory referral/consult to Cardiology  -     CV Ultrasound Bilateral Doppler Carotid; Future    CKD (chronic kidney disease) stage 4, GFR 15-29  ml/min  -     Lipid Panel; Future  -     CV Ultrasound Bilateral Doppler Carotid; Future    FSGS (focal segmental glomerulosclerosis)    Secondary renal hyperparathyroidism    Severe obesity (BMI 35.0-35.9 with comorbidity)    History of mitral valve prolapse  -     Echo; Future    Mild mitral regurgitation  -     Echo; Future    Hypercholesteremia  -     Lipid Panel; Future    Ex-smoker  -     CV Ultrasound Bilateral Doppler Carotid; Future    Other orders  -     pravastatin (PRAVACHOL) 40 MG tablet; Take 1 tablet (40 mg total) by mouth once daily.      CAD risk stratification with carotid US. Increase pravachol  Counseled on importance of heart healthy diet low in saturated and trans fat and salt as well gradually starting a regular aerobic exercise regimen with goal of 30min 5x/week. Recommend BP diary. Call if systolic BP > 130 mmHg on checking repeatedly

## 2022-12-29 LAB
LEFT ARM DIASTOLIC BLOOD PRESSURE: 80 MMHG
LEFT ARM SYSTOLIC BLOOD PRESSURE: 134 MMHG
LEFT CBA DIAS: 12 CM/S
LEFT CBA SYS: 51 CM/S
LEFT CCA DIST DIAS: 13 CM/S
LEFT CCA DIST SYS: 75 CM/S
LEFT CCA MID DIAS: 13 CM/S
LEFT CCA MID SYS: 74 CM/S
LEFT CCA PROX DIAS: 14 CM/S
LEFT CCA PROX SYS: 73 CM/S
LEFT ECA DIAS: 13 CM/S
LEFT ECA SYS: 72 CM/S
LEFT ICA DIST DIAS: 33 CM/S
LEFT ICA DIST SYS: 100 CM/S
LEFT ICA MID DIAS: 21 CM/S
LEFT ICA MID SYS: 64 CM/S
LEFT ICA PROX DIAS: 25 CM/S
LEFT ICA PROX SYS: 64 CM/S
LEFT VERTEBRAL DIAS: 17 CM/S
LEFT VERTEBRAL SYS: 54 CM/S
OHS CV CAROTID RIGHT ICA EDV HIGHEST: 27
OHS CV CAROTID ULTRASOUND LEFT ICA/CCA RATIO: 1.33
OHS CV CAROTID ULTRASOUND RIGHT ICA/CCA RATIO: 1.16
OHS CV PV CAROTID LEFT HIGHEST CCA: 75
OHS CV PV CAROTID LEFT HIGHEST ICA: 100
OHS CV PV CAROTID RIGHT HIGHEST CCA: 84
OHS CV PV CAROTID RIGHT HIGHEST ICA: 87
OHS CV US CAROTID LEFT HIGHEST EDV: 33
RIGHT CBA DIAS: 14 CM/S
RIGHT CBA SYS: 38 CM/S
RIGHT CCA DIST DIAS: 20 CM/S
RIGHT CCA DIST SYS: 75 CM/S
RIGHT CCA MID DIAS: 14 CM/S
RIGHT CCA MID SYS: 76 CM/S
RIGHT CCA PROX DIAS: 15 CM/S
RIGHT CCA PROX SYS: 84 CM/S
RIGHT ECA DIAS: 12 CM/S
RIGHT ECA SYS: 82 CM/S
RIGHT ICA DIST DIAS: 27 CM/S
RIGHT ICA DIST SYS: 87 CM/S
RIGHT ICA MID DIAS: 22 CM/S
RIGHT ICA MID SYS: 72 CM/S
RIGHT ICA PROX DIAS: 18 CM/S
RIGHT ICA PROX SYS: 51 CM/S
RIGHT VERTEBRAL DIAS: 10 CM/S
RIGHT VERTEBRAL SYS: 51 CM/S

## 2022-12-30 NOTE — PROGRESS NOTES
plz let pt. Know that echo (ultrasound of the heart) is normal. Upper chamber is mildly enlarged. There is no mitral valve prolapse seen.

## 2023-01-04 ENCOUNTER — LAB VISIT (OUTPATIENT)
Dept: LAB | Facility: HOSPITAL | Age: 58
End: 2023-01-04
Attending: INTERNAL MEDICINE
Payer: COMMERCIAL

## 2023-01-04 DIAGNOSIS — N18.4 CKD (CHRONIC KIDNEY DISEASE) STAGE 4, GFR 15-29 ML/MIN: ICD-10-CM

## 2023-01-04 DIAGNOSIS — E78.00 HYPERCHOLESTEREMIA: ICD-10-CM

## 2023-01-04 LAB
CHOLEST SERPL-MCNC: 224 MG/DL (ref 120–199)
CHOLEST/HDLC SERPL: 3.3 {RATIO} (ref 2–5)
HDLC SERPL-MCNC: 67 MG/DL (ref 40–75)
HDLC SERPL: 29.9 % (ref 20–50)
LDLC SERPL CALC-MCNC: 142.8 MG/DL (ref 63–159)
NONHDLC SERPL-MCNC: 157 MG/DL
TRIGL SERPL-MCNC: 71 MG/DL (ref 30–150)

## 2023-01-04 PROCEDURE — 80061 LIPID PANEL: CPT | Mod: NTX | Performed by: INTERNAL MEDICINE

## 2023-01-04 PROCEDURE — 36415 COLL VENOUS BLD VENIPUNCTURE: CPT | Mod: PO,NTX | Performed by: INTERNAL MEDICINE

## 2023-01-11 ENCOUNTER — PATIENT MESSAGE (OUTPATIENT)
Dept: CARDIOLOGY | Facility: CLINIC | Age: 58
End: 2023-01-11
Payer: COMMERCIAL

## 2023-01-11 DIAGNOSIS — R89.9 ABNORMAL LABORATORY TEST: Primary | ICD-10-CM

## 2023-02-15 ENCOUNTER — TELEPHONE (OUTPATIENT)
Dept: TRANSPLANT | Facility: CLINIC | Age: 58
End: 2023-02-15
Payer: COMMERCIAL

## 2023-02-15 NOTE — TELEPHONE ENCOUNTER
F/U call to the patient regarding her local Pap/Gyn Visit; No answer; left a detailed message with phone number to return the call. I have explained in the message that I will be forwarding the patient a letter requesting the patient's local Pap/Gyn Visit. Letter sent

## 2023-02-22 ENCOUNTER — PATIENT MESSAGE (OUTPATIENT)
Dept: BARIATRICS | Facility: CLINIC | Age: 58
End: 2023-02-22
Payer: COMMERCIAL

## 2023-03-02 ENCOUNTER — TELEPHONE (OUTPATIENT)
Dept: TRANSPLANT | Facility: CLINIC | Age: 58
End: 2023-03-02
Payer: COMMERCIAL

## 2023-03-02 NOTE — TELEPHONE ENCOUNTER
Spoke to the patient; confirmed that I have received her Pap/Gyn visit and her chart will be submitted for review. The patient have no further questions or concerns at this time.

## 2023-03-02 NOTE — TELEPHONE ENCOUNTER
Return the patient's call; No answer; Left a voicemail confirming that I have received the patient's Pap/Gyn records and her chart is in review status.     ----- Message from Earnestine Avila MA sent at 2/20/2023  2:30 PM CST -----  Regarding: FW: Return Call    ----- Message -----  From: Pam Little RN  Sent: 2/20/2023  11:25 AM CST  To: Earnestine Avila MA  Subject: FW: Return Call                                    ----- Message -----  From: Aby Mina  Sent: 2/20/2023   8:43 AM CST  To: [facility] Pre-Kidney Transplant Non-Clinical  Subject: Return Call                                      Pt is returning call to let you know that she already had  the gynecology visit  you spoke of in November, with Dr Mary Roldan .   Please advise.    [phone] (home) [phone] (work)

## 2023-03-07 ENCOUNTER — TELEPHONE (OUTPATIENT)
Dept: TRANSPLANT | Facility: CLINIC | Age: 58
End: 2023-03-07
Payer: COMMERCIAL

## 2023-03-08 ENCOUNTER — TELEPHONE (OUTPATIENT)
Dept: TRANSPLANT | Facility: CLINIC | Age: 58
End: 2023-03-08
Payer: COMMERCIAL

## 2023-03-08 NOTE — TELEPHONE ENCOUNTER
MA notes    I did message Dr Keshav Dozier (nephrologist) nurse Glo Gonzalez in regards on getting the pt's compliance information.  Will f/u with the nurse Glo later today.    Nyla Andrade MA

## 2023-03-08 NOTE — TELEPHONE ENCOUNTER
MA notes    Per Glo Gonzalez (nurse for Dr Dozier Nephrologist) she will forward the adherence form to Dr Dozier.      Nyla Andrade MA

## 2023-03-10 ENCOUNTER — COMMITTEE REVIEW (OUTPATIENT)
Dept: TRANSPLANT | Facility: CLINIC | Age: 58
End: 2023-03-10
Payer: COMMERCIAL

## 2023-03-10 NOTE — COMMITTEE REVIEW
Native Organ Dx: Focal Glomerular Sclerosis (Focal Segmental - FSG)      SELECTION COMMITTEE NOTE    Jacqui Leonard was presented at selection committee on 3/10/23.  Patient met selection criteria for kidney transplant related to CKD, Stage 4 due to    Focal Glomerular Sclerosis (Focal Segmental - FSG).  No absolute contraindications to transplant at this time.  Patient will be placed on the cadaveric wait list pending final financial approval from insurance company.  Patient will return to clinic for routine appointment in 1 year(s). Patient does not meet criteria for High KDPI kidney offer. Patient meets HCV+ kidney offer. Patient does not meet criteria for dual/enbloc, due to weight.    Encourage weight loss.    Patient informed of committee's decision. Reports understanding. Denies any questions or concerns at this time.       Note written by SHERI Little RN     ===============================================    I was present at the meeting and attest to the decision of the committee

## 2023-03-10 NOTE — LETTER
March 10, 2023    Keshav Dozier MD  [address] [address] [address]  Phone: [phone]  Fax: [phone]     Dear Dr. Dozier:    Patient: Jacqui Leonard   MR Number: [id number]   YOB: 1965     Your patient, Jacqui Leonard, was recently discussed at the [facility] Selection Committee meeting on 3/10/2023. I am happy to inform you that Jacqui has been approved for transplantation.  She has met selection criteria for a kidney transplant related to CKD stage 4 secondary to primary diagnosis of Focal Glomerular Sclerosis (Focal Segmental - FSG). Your patient will be placed on the cadaveric wait list pending final financial approval from insurance company.     We appreciate your confidence in allowing us to participate in your patients care.  If you have any questions or concerns, please do not hesitate to contact me.    Sincerely,      Mary Ames MD  Medical Director, Kidney & Kidney/Pancreas Transplantation    CC:  Jacqui Leonard (patient)

## 2023-03-20 DIAGNOSIS — Z76.82 ORGAN TRANSPLANT CANDIDATE: Primary | ICD-10-CM

## 2023-03-21 ENCOUNTER — TELEPHONE (OUTPATIENT)
Dept: TRANSPLANT | Facility: CLINIC | Age: 58
End: 2023-03-21
Payer: COMMERCIAL

## 2023-03-21 DIAGNOSIS — Z76.82 ORGAN TRANSPLANT CANDIDATE: Primary | ICD-10-CM

## 2023-03-21 DIAGNOSIS — Z76.82 PRE-KIDNEY TRANSPLANT, LISTED: Primary | ICD-10-CM

## 2023-03-21 NOTE — TELEPHONE ENCOUNTER
"  KIDNEY WAIT LISTING NOTE    Date of Financial clearance to list: 3/14/23    [facility]N/[facility]:     Organ: Kidney    Last Name: Horacio  First Name: Jacqui SCHUMACHER: 1965       Gender: female        MRN#: [id number]                                   State of Permanent Residence: [facility]  [place] [id number]    Ethnicity: Not  or /a   Race:      White    CLINICAL INFORMATION   Candidate Medical Urgency Status: Active (1)  Number of Previous Kidney Transplants: 0  Number of Previous Solid Organ Transplants: 0  Did you enter number of previous kidney or other solid organ transplants? yes  Is this Candidate a Prior Living Donor: no  (If yes, please generate letter to [employer] with patient's date of donation, recipient SSN, signed by Surgical Director after patient is listed in order to receive priority points).      ABO  ABO Blood Group:   A POS     ABO Confirmation: (THESE DATES MUST BE PRIOR TO THE LIST DATE AND SUPPORTED BY SEPARATE LAB REPORTS)    Internal Results    Lab Results   Component Value Date    GROUPTRH A POS 2022    GROUPTRH A POS 10/22/2021     No results found for: ABO    External Results    ABO Date 1:    ABO Date 2  Are either of these ABO results based on External Labs? no  (If Yes, STOP and go to source document in Media Tab for verification).    VITALS  Height:  5'3"  Weight:  97.5 kg  (Use height from Transplant clinic visits only).  Did you enter height/weight? Yes    HLA    Class I:  Lab Results   Component Value Date    GUAB8YC 3 2022    RWOO9ZQ 23 2022    LDMJ3LM 62 2022    SBDT2AG 41 2022    MCSRL2DX 6 2022    OHGPS2VC XX 2022    YAUEH1BU 1 2022    WIFWM4NQ 17 2022       Class II:  Lab Results   Component Value Date    USBQLQ75ML 11 2022    FELZCP45PT 14 2022    BKXGRD283CC 52 2022    RSTJRF6508 XX 2022    OKTKR8PO 7 2022    JSXFP5VS 5 2022       Tested for HLA Antibodies: Yes, " "antibodies detected     If result is "Positive" antibodies are detected     If result is "Negative or questionable" no antibodies detected    Lab Results   Component Value Date    CIPRAS Positive 11/03/2022    CIIPRAS Positive 11/03/2022       DIALYSIS INFORMATION  Is patient Pre-Dialysis: Yes     GFR Information  Report GFR being used as the criteria for placement on the kidney list. If not, leave blank  GFR < or = 20 ml/min? Yes  If Yes, Specify value  _19.1__   ml/min     Initial date GFR became 20 or less: 11/3/22  Is GFR obtained from an Outside lab Result? No  (If YES verify with source document scanned into media)    If patient on Dialysis:    Is candidate currently on dialysis for ESRD? No  If Yes,  Date Chronic Dialysis Started:   (Not currently on dialysis)  (verify with source document in Media Tab)   Dialysis Unit Name: (Not currently on dialysis)                        Physician Name:  Dr. Mary Guerrier  NPI#: [id number]    DIABETES INFORMATION  Primary Native Kidney Diagnosis: Focal Glomerular Sclerosis (Focal Segmental - FSG)  C-Peptide Value - No results found for: CPEPTIDE  Current Diabetes Status: None    FOR NON-KIDNEY DEPARTMENT USE ONLY:  Additional Organs Registered? none    Maximum Acceptable Number of HLA Mismatches  ABDR:     6      (0-6)               AB:               (0-4)  ADR:   _____  (0-4)              BDR: _____ (0-4)  A:        _____  (0-2)              B:      _____ (0-2)          DR: ______ (0-2)    Will Recipient Accept?   Accept HBcAB Positive Organ:            Yes  Accept HBV CLAY Positive Organ:        no  Accept HCV Antibody Positive Organ: yes   Accept HCV CLAY Positive Organ: yes    Dual Kidney and En Bloc Opt In : No  Dual  Local:   No  Dual Import:   No  En Bloc Local:   No  En Bloc Import: No     Accept KDPI > 85: Single: No     Local: No     Import: No  Accept KDPI > 85: Dual: No     Local: No     Import: No    ### NURSE TO VERIFY CONSENT AND MAKE ANY NECESSARY " CHANGES NEEDED IN UNET AT THE TIME OF VERIFICATION ###    Unacceptible Antigens  If yes, list     Lab Results   Component Value Date    PL6QHQX A36,A25,A26 11/03/2022    CIABCLM K01--AKYY A43 11/03/2022    CIIAB DR7,DQ2 11/03/2022       ### DO NOT LIST IF ANTIGEN VALUE WEAK ###    Hep B Vaccine series completed: no    Blood Type x2, eGFR, and all serologies were verified by myself and Isaura Aden.  Myla Sidhu will be new coordinator.   Blood type determination and reporting was completed according to the programs protocols and OPTN requirements.

## 2023-03-21 NOTE — LETTER
2023    Jacqui Horacio  [address] [place] [address]    Dear Jacqui Leonard:  MRN: [id number]    Congratulations! On 3/21/2023, you were placed on  the waiting list for a  donor transplant.    Your candidacy for kidney transplant is based on the following criteria: Chronic Kidney Disease Stage IV due to Focal Glomerular Sclerosis (Focal Segmental - FSG).    Your transplant coordinator while on the waiting list is Myla Sidhu RN. They can be reached at [phone] or [phone] with any questions.      What to do now?    Ask your living donors to call and begin testing  Give your donors our phone number, [phone]  Make sure your donors have your name and date of birth when they call  You will get transplanted much faster if you have a living donor    Have your blood sent to our Transplant Lab every month  If you are on dialysis - our Transplant Lab will work with your dialysis unit to send your blood every month  If you are not on dialysis   If you live near an [facility] lab, we will schedule you to have blood drawn every month  If you do not live near an [facility] lab, you will be sent blood kits in the mail. You will need to take a kit to your local lab or doctor to have your blood drawn every month and mail to the Transplant Lab.     Call us with ANY CHANGES  Phone numbers - we must be able to reach you anytime of the day or night when a kidney is available  Address  Insurance coverage  Dialysis unit or kidney doctor  Izaiah: if you have surgery, stay in the hospital, have to get blood, or have an infection    Review your Kidney/Kidney-Pancreas Transplant Guide   This will give you detailed information about what happens when  you are on the waiting list   you are called when a kidney is available    The [facility] has a transplant surgeon and physician available 365 days a year, 24 hours a day to coordinate organ acceptance, procurement, surgical  placement and to address urgent patient care issues.  You will be notified in writing of any changes to our Transplant Centers staffing plan that would impact your ability to receive a transplant.    Attached is a letter from the [employer] Organ [employer]aring ([employer]). It describes the services and information offered to patients by [employer] and the Organ Procurement and Transplant Network. We look forward to working with you while on the waiting list.     Congratulations,    Your Transplant Partner  [facility]   [address] [place] [id number]  [phone]  lh/enclosure  CC:  Keshav Dozier MD                                                                                               The Organ Procurement and Transplantation Network   Toll-free patient services line: Your resource for organ transplant information     If you have a question regarding your own medical care, you always should call your transplant hospital first. However, for general organ transplant-related information, you can call the Organ Procurement and Transplantation Network (OPTN) toll-free patient services line at [phone].     Anyone, including potential transplant candidates, candidates, recipients, family members, friends, living donors, and donor family members, can call this number to:     Talk about organ donation, living donation, the transplant process, the donation process, and transplant policies.   Get a free patient information kit with helpful booklets, waiting list and transplant information, and a list of all transplant hospitals.   Ask questions about the OPTN website (https://optn.transplant.hrsa.gov/), the [employer]s ([employer]) website (https://unos.org/), or the [employer] website for living donors and transplant recipients. (https://www.transplantliving.org/).   Learn how the OPTN can help you.   Talk about any concerns that you may have with a transplant  hospital.     The nations transplant system, the OPTN, is managed under federal contract by the [employer]aring ([employer]), which is a non-profit charitable organization. The OPTN helps create and define organ sharing policies that make the best use of donated organs. This process continuously evaluating new advances and discoveries so policies can be adapted to best serve patients waiting for transplants. To do so, the OPTN works closely with transplant professionals, transplant patients, transplant candidates, donor families, living donors, and the public. All transplant programs and organ procurement organizations throughout the country are OPTN members and are obligated to follow the policies the OPTN creates for allocating organs.     The OPTN also is responsible for:   Providing educational material for patients, the public, and professionals.   Raising awareness of the need for donated organs and tissue.   Coordinating organ procurement, matching, and placement.   Collecting information about every organ transplant and donation that occurs in the [place].     Remember, you should contact your transplant hospital directly if you have questions or concerns about your own medical care including medical records, work-up progress, and test results.     We are not your transplant hospital, and our staff will not be able to answer questions about your case, so please keep your transplant hospitals phone number handy.   However, while you research your transplant needs and learn as much as you can about transplantation and donation, we welcome your call to our toll-free patient services line at [phone].

## 2023-03-21 NOTE — TELEPHONE ENCOUNTER
Patient informed financial clearance has been received, and she is now being listed active on the kidney transplant wait list. Patient confirms her address, phone number, and current nephrologist are listed correctly in our system. Patient also reports she has not received the Hep B vaccination. All questions answered. Denies other questions or concerns at this time.

## 2023-03-28 ENCOUNTER — TELEPHONE (OUTPATIENT)
Dept: NEPHROLOGY | Facility: CLINIC | Age: 58
End: 2023-03-28
Payer: COMMERCIAL

## 2023-03-28 NOTE — TELEPHONE ENCOUNTER
----- Message from Katelyn Ansari sent at 3/28/2023 11:43 AM CDT -----  Type:  Sooner Appointment Request    Caller is requesting a sooner appointment.  Caller declined first available appointment listed below.  Caller will not accept being placed on the waitlist and is requesting a message be sent to doctor.    Name of Caller:  pt  When is the first available appointment?  6/28--said she need to be seen sooner--please call and advise  Symptoms:  check up--said she just gort approved for a transplant and need to see the dr Gonzalez Call Back Number:  [phone] (home) [phone] (work)    Additional Information:  thank you

## 2023-03-29 NOTE — PROGRESS NOTES
YEARLY LIST MANAGEMENT NOTE    Jacquitylor Leonard's kidney transplant listing status reviewed.  Patient is due for follow-up appointments on 11/3/23.  Appointments will be scheduled per protocol.

## 2023-03-30 ENCOUNTER — LAB VISIT (OUTPATIENT)
Dept: LAB | Facility: HOSPITAL | Age: 58
End: 2023-03-30
Attending: INTERNAL MEDICINE
Payer: COMMERCIAL

## 2023-03-30 DIAGNOSIS — N18.4 CKD (CHRONIC KIDNEY DISEASE) STAGE 4, GFR 15-29 ML/MIN: ICD-10-CM

## 2023-03-30 LAB
ALBUMIN SERPL BCP-MCNC: 3.5 G/DL (ref 3.5–5.2)
ANION GAP SERPL CALC-SCNC: 10 MMOL/L (ref 8–16)
BASOPHILS # BLD AUTO: 0.02 K/UL (ref 0–0.2)
BASOPHILS NFR BLD: 0.4 % (ref 0–1.9)
BUN SERPL-MCNC: 42 MG/DL (ref 6–20)
CALCIUM SERPL-MCNC: 10.1 MG/DL (ref 8.7–10.5)
CHLORIDE SERPL-SCNC: 104 MMOL/L (ref 95–110)
CO2 SERPL-SCNC: 28 MMOL/L (ref 23–29)
CREAT SERPL-MCNC: 3.7 MG/DL (ref 0.5–1.4)
DIFFERENTIAL METHOD: ABNORMAL
EOSINOPHIL # BLD AUTO: 0.2 K/UL (ref 0–0.5)
EOSINOPHIL NFR BLD: 4.9 % (ref 0–8)
ERYTHROCYTE [DISTWIDTH] IN BLOOD BY AUTOMATED COUNT: 13.3 % (ref 11.5–14.5)
EST. GFR  (NO RACE VARIABLE): 13.6 ML/MIN/1.73 M^2
GLUCOSE SERPL-MCNC: 88 MG/DL (ref 70–110)
HCT VFR BLD AUTO: 35.3 % (ref 37–48.5)
HGB BLD-MCNC: 10.8 G/DL (ref 12–16)
IMM GRANULOCYTES # BLD AUTO: 0.01 K/UL (ref 0–0.04)
IMM GRANULOCYTES NFR BLD AUTO: 0.2 % (ref 0–0.5)
LYMPHOCYTES # BLD AUTO: 1 K/UL (ref 1–4.8)
LYMPHOCYTES NFR BLD: 19.6 % (ref 18–48)
MCH RBC QN AUTO: 28.9 PG (ref 27–31)
MCHC RBC AUTO-ENTMCNC: 30.6 G/DL (ref 32–36)
MCV RBC AUTO: 94 FL (ref 82–98)
MONOCYTES # BLD AUTO: 0.5 K/UL (ref 0.3–1)
MONOCYTES NFR BLD: 9.6 % (ref 4–15)
NEUTROPHILS # BLD AUTO: 3.2 K/UL (ref 1.8–7.7)
NEUTROPHILS NFR BLD: 65.3 % (ref 38–73)
NRBC BLD-RTO: 0 /100 WBC
PHOSPHATE SERPL-MCNC: 4.8 MG/DL (ref 2.7–4.5)
PLATELET # BLD AUTO: 242 K/UL (ref 150–450)
PMV BLD AUTO: 10.6 FL (ref 9.2–12.9)
POTASSIUM SERPL-SCNC: 4.7 MMOL/L (ref 3.5–5.1)
PTH-INTACT SERPL-MCNC: 151.6 PG/ML (ref 9–77)
RBC # BLD AUTO: 3.74 M/UL (ref 4–5.4)
SODIUM SERPL-SCNC: 142 MMOL/L (ref 136–145)
WBC # BLD AUTO: 4.9 K/UL (ref 3.9–12.7)

## 2023-03-30 PROCEDURE — 36415 COLL VENOUS BLD VENIPUNCTURE: CPT | Mod: PO,TXP | Performed by: INTERNAL MEDICINE

## 2023-03-30 PROCEDURE — 85025 COMPLETE CBC W/AUTO DIFF WBC: CPT | Mod: NTX | Performed by: INTERNAL MEDICINE

## 2023-03-30 PROCEDURE — 83970 ASSAY OF PARATHORMONE: CPT | Mod: NTX | Performed by: INTERNAL MEDICINE

## 2023-03-30 PROCEDURE — 80069 RENAL FUNCTION PANEL: CPT | Mod: NTX | Performed by: INTERNAL MEDICINE

## 2023-04-03 ENCOUNTER — OFFICE VISIT (OUTPATIENT)
Dept: NEPHROLOGY | Facility: CLINIC | Age: 58
End: 2023-04-03
Payer: COMMERCIAL

## 2023-04-03 VITALS
DIASTOLIC BLOOD PRESSURE: 82 MMHG | WEIGHT: 204 LBS | BODY MASS INDEX: 34.83 KG/M2 | SYSTOLIC BLOOD PRESSURE: 144 MMHG | HEIGHT: 64 IN

## 2023-04-03 DIAGNOSIS — E66.01 SEVERE OBESITY: ICD-10-CM

## 2023-04-03 DIAGNOSIS — R80.1 PERSISTENT PROTEINURIA: ICD-10-CM

## 2023-04-03 DIAGNOSIS — N28.1 ACQUIRED CYST OF KIDNEY: ICD-10-CM

## 2023-04-03 DIAGNOSIS — N05.1 FSGS (FOCAL SEGMENTAL GLOMERULOSCLEROSIS): ICD-10-CM

## 2023-04-03 DIAGNOSIS — N18.4 CKD (CHRONIC KIDNEY DISEASE) STAGE 4, GFR 15-29 ML/MIN: Primary | ICD-10-CM

## 2023-04-03 DIAGNOSIS — N25.81 SECONDARY RENAL HYPERPARATHYROIDISM: ICD-10-CM

## 2023-04-03 PROCEDURE — 1159F MED LIST DOCD IN RCRD: CPT | Mod: CPTII,S$GLB,, | Performed by: INTERNAL MEDICINE

## 2023-04-03 PROCEDURE — 99999 PR PBB SHADOW E&M-EST. PATIENT-LVL III: CPT | Mod: PBBFAC,,, | Performed by: INTERNAL MEDICINE

## 2023-04-03 PROCEDURE — 3066F NEPHROPATHY DOC TX: CPT | Mod: CPTII,S$GLB,, | Performed by: INTERNAL MEDICINE

## 2023-04-03 PROCEDURE — 3008F BODY MASS INDEX DOCD: CPT | Mod: CPTII,S$GLB,, | Performed by: INTERNAL MEDICINE

## 2023-04-03 PROCEDURE — 1159F PR MEDICATION LIST DOCUMENTED IN MEDICAL RECORD: ICD-10-PCS | Mod: CPTII,S$GLB,, | Performed by: INTERNAL MEDICINE

## 2023-04-03 PROCEDURE — 99215 OFFICE O/P EST HI 40 MIN: CPT | Mod: S$GLB,,, | Performed by: INTERNAL MEDICINE

## 2023-04-03 PROCEDURE — 3079F PR MOST RECENT DIASTOLIC BLOOD PRESSURE 80-89 MM HG: ICD-10-PCS | Mod: CPTII,S$GLB,, | Performed by: INTERNAL MEDICINE

## 2023-04-03 PROCEDURE — 3079F DIAST BP 80-89 MM HG: CPT | Mod: CPTII,S$GLB,, | Performed by: INTERNAL MEDICINE

## 2023-04-03 PROCEDURE — 99215 PR OFFICE/OUTPT VISIT, EST, LEVL V, 40-54 MIN: ICD-10-PCS | Mod: S$GLB,,, | Performed by: INTERNAL MEDICINE

## 2023-04-03 PROCEDURE — 3077F PR MOST RECENT SYSTOLIC BLOOD PRESSURE >= 140 MM HG: ICD-10-PCS | Mod: CPTII,S$GLB,, | Performed by: INTERNAL MEDICINE

## 2023-04-03 PROCEDURE — 3008F PR BODY MASS INDEX (BMI) DOCUMENTED: ICD-10-PCS | Mod: CPTII,S$GLB,, | Performed by: INTERNAL MEDICINE

## 2023-04-03 PROCEDURE — 99999 PR PBB SHADOW E&M-EST. PATIENT-LVL III: ICD-10-PCS | Mod: PBBFAC,,, | Performed by: INTERNAL MEDICINE

## 2023-04-03 PROCEDURE — 3066F PR DOCUMENTATION OF TREATMENT FOR NEPHROPATHY: ICD-10-PCS | Mod: CPTII,S$GLB,, | Performed by: INTERNAL MEDICINE

## 2023-04-03 PROCEDURE — 3077F SYST BP >= 140 MM HG: CPT | Mod: CPTII,S$GLB,, | Performed by: INTERNAL MEDICINE

## 2023-04-03 NOTE — PROGRESS NOTES
"  Subjective:       Patient ID: Jacqui Leonard is a 57 y.o. White female who presents for return patient evaluation for chronic renal failure.      She has fatigue and also has a great deal of stress with her job.  Her blood pressure at home has been 110-120/60-70.  There have been no recent illnesses, hospitalizations or procedures.  She had a renal biopsy in 2006.  She had COVID in June 2022.  She did have an ankle sprain late last year but has not been on NSAIDs.      Review of Systems   Constitutional:  Positive for fatigue. Negative for appetite change, chills and fever.   HENT:  Negative for congestion.    Eyes:  Negative for visual disturbance.   Respiratory:  Positive for shortness of breath (with exertion). Negative for cough.    Cardiovascular:  Negative for chest pain and leg swelling.   Gastrointestinal:  Negative for nausea.   Genitourinary:  Negative for difficulty urinating, dysuria and hematuria.   Musculoskeletal:  Positive for arthralgias (R shoulder) and gait problem. Negative for back pain.   Skin:  Negative for rash.   Neurological:  Negative for headaches.   Psychiatric/Behavioral:  Positive for sleep disturbance. The patient is nervous/anxious.        The past medical, family and social histories were reviewed for this encounter.    The patient's last visit with me was on 8/5/2022.      BP (!) 144/82 (BP Location: Left arm, Patient Position: Sitting, BP Method: Large (Manual))   Ht 5' 4" (1.626 m)   Wt 92.5 kg (204 lb)   BMI 35.02 kg/m²     Objective:      Physical Exam  Vitals reviewed.   Constitutional:       General: She is not in acute distress.     Appearance: She is well-developed. She is obese.   HENT:      Head: Normocephalic and atraumatic.   Eyes:      General: No scleral icterus.     Conjunctiva/sclera: Conjunctivae normal.   Neck:      Vascular: No JVD.   Cardiovascular:      Rate and Rhythm: Normal rate and regular rhythm.      Heart sounds: Normal heart sounds. No murmur " heard.    No friction rub. No gallop.   Pulmonary:      Effort: Pulmonary effort is normal. No respiratory distress.      Breath sounds: Normal breath sounds. No wheezing.   Abdominal:      General: Bowel sounds are normal. There is no distension.      Palpations: Abdomen is soft.      Tenderness: There is no abdominal tenderness.   Musculoskeletal:      Cervical back: Normal range of motion.      Right lower leg: Edema (trace-1+) present.      Left lower leg: Edema (trace-1+) present.   Skin:     General: Skin is warm and dry.      Findings: No rash.   Neurological:      Mental Status: She is alert and oriented to person, place, and time.   Psychiatric:         Mood and Affect: Mood normal.         Behavior: Behavior normal.      Assessment:       1. CKD (chronic kidney disease) stage 4, GFR 15-29 ml/min    2. Persistent proteinuria    3. Acquired cyst of kidney    4. Secondary renal hyperparathyroidism    5. FSGS (focal segmental glomerulosclerosis)    6. Severe obesity        Lab Results   Component Value Date    CREATININE 3.7 (H) 03/30/2023    BUN 42 (H) 03/30/2023     03/30/2023    K 4.7 03/30/2023     03/30/2023    CO2 28 03/30/2023     Lab Results   Component Value Date    .6 (H) 03/30/2023    CALCIUM 10.1 03/30/2023    PHOS 4.8 (H) 03/30/2023     Lab Results   Component Value Date    HCT 35.3 (L) 03/30/2023     Prot/Creat Ratio, Urine   Date Value Ref Range Status   03/30/2023 3.02 (H) 0.00 - 0.20 Final   08/02/2022 5.38 (H) 0.00 - 0.20 Final   04/06/2022 1.92 (H) 0.00 - 0.20 Final     Plan:   Return to clinic in 2 months.  Labs for this week includes rp, pth, cbc, upc at the [place] location on weekday or [facility] on weekend ([facility]).  Repeat Rp this week.  Baseline creatinine is 1.2-1.5 prior to 2016.  She has been 1.8-2.4 since 2020.  PTH is 151 with a calcium of 10.1.  Continue calcitriol 0.5 mcg daily.   UPC is 3.0.  She used to be on an ACE I but is not on one currently due to low blood  pressure.  Re-biopsy was not done due to increased bleeding risk.  Renal US shows R 10.5 cm L 10.7 cm.  She has been seen by [facility].  We discussed making lifestyle changes and using a Mediterranean diet for health benefits and weight loss.  She has been referred to Transplant.

## 2023-04-05 ENCOUNTER — LAB VISIT (OUTPATIENT)
Dept: LAB | Facility: HOSPITAL | Age: 58
End: 2023-04-05
Attending: INTERNAL MEDICINE
Payer: COMMERCIAL

## 2023-04-05 DIAGNOSIS — Z76.82 ORGAN TRANSPLANT CANDIDATE: ICD-10-CM

## 2023-04-05 DIAGNOSIS — R89.9 ABNORMAL LABORATORY TEST: ICD-10-CM

## 2023-04-05 DIAGNOSIS — N18.4 CKD (CHRONIC KIDNEY DISEASE) STAGE 4, GFR 15-29 ML/MIN: ICD-10-CM

## 2023-04-05 LAB
ALBUMIN SERPL BCP-MCNC: 3.4 G/DL (ref 3.5–5.2)
ANION GAP SERPL CALC-SCNC: 13 MMOL/L (ref 8–16)
BUN SERPL-MCNC: 48 MG/DL (ref 6–20)
CALCIUM SERPL-MCNC: 9.7 MG/DL (ref 8.7–10.5)
CHLORIDE SERPL-SCNC: 105 MMOL/L (ref 95–110)
CHOLEST SERPL-MCNC: 141 MG/DL (ref 120–199)
CHOLEST/HDLC SERPL: 2.3 {RATIO} (ref 2–5)
CO2 SERPL-SCNC: 22 MMOL/L (ref 23–29)
CREAT SERPL-MCNC: 3.9 MG/DL (ref 0.5–1.4)
EST. GFR  (NO RACE VARIABLE): 12.8 ML/MIN/1.73 M^2
GLUCOSE SERPL-MCNC: 73 MG/DL (ref 70–110)
HDLC SERPL-MCNC: 61 MG/DL (ref 40–75)
HDLC SERPL: 43.3 % (ref 20–50)
LDLC SERPL CALC-MCNC: 70.4 MG/DL (ref 63–159)
NONHDLC SERPL-MCNC: 80 MG/DL
PHOSPHATE SERPL-MCNC: 5 MG/DL (ref 2.7–4.5)
POTASSIUM SERPL-SCNC: 4.4 MMOL/L (ref 3.5–5.1)
SODIUM SERPL-SCNC: 140 MMOL/L (ref 136–145)
TRIGL SERPL-MCNC: 48 MG/DL (ref 30–150)

## 2023-04-05 PROCEDURE — 86833 HLA CLASS II HIGH DEFIN QUAL: CPT | Mod: PO,TXP | Performed by: NURSE PRACTITIONER

## 2023-04-05 PROCEDURE — 36415 COLL VENOUS BLD VENIPUNCTURE: CPT | Mod: PO,NTX | Performed by: INTERNAL MEDICINE

## 2023-04-05 PROCEDURE — 80061 LIPID PANEL: CPT | Mod: NTX | Performed by: INTERNAL MEDICINE

## 2023-04-05 PROCEDURE — 86832 HLA CLASS I HIGH DEFIN QUAL: CPT | Mod: PO,TXP | Performed by: NURSE PRACTITIONER

## 2023-04-05 PROCEDURE — 80069 RENAL FUNCTION PANEL: CPT | Mod: TXP | Performed by: INTERNAL MEDICINE

## 2023-04-12 ENCOUNTER — TELEPHONE (OUTPATIENT)
Dept: TRANSPLANT | Facility: CLINIC | Age: 58
End: 2023-04-12
Payer: COMMERCIAL

## 2023-04-13 ENCOUNTER — PATIENT MESSAGE (OUTPATIENT)
Dept: TRANSPLANT | Facility: CLINIC | Age: 58
End: 2023-04-13
Payer: COMMERCIAL

## 2023-04-25 ENCOUNTER — TELEPHONE (OUTPATIENT)
Dept: CARDIOLOGY | Facility: CLINIC | Age: 58
End: 2023-04-25
Payer: COMMERCIAL

## 2023-04-27 LAB — HPRA INTERPRETATION: NORMAL

## 2023-05-01 ENCOUNTER — TELEPHONE (OUTPATIENT)
Dept: TRANSPLANT | Facility: CLINIC | Age: 58
End: 2023-05-01
Payer: COMMERCIAL

## 2023-05-01 DIAGNOSIS — Z76.82 ORGAN TRANSPLANT CANDIDATE: Primary | ICD-10-CM

## 2023-05-08 ENCOUNTER — TELEPHONE (OUTPATIENT)
Dept: TRANSPLANT | Facility: CLINIC | Age: 58
End: 2023-05-08
Payer: COMMERCIAL

## 2023-05-08 NOTE — TELEPHONE ENCOUNTER
I have attempted without success to contact this patient by phone to reschedule appt for labs. Message was left to return call.

## 2023-05-08 NOTE — TELEPHONE ENCOUNTER
"----- Message from Myla Sidhu RN sent at 5/8/2023 12:37 PM CDT -----  Trinh,    Can you reschedule her monthly HLA PRA lab that she was scheduled for on 5/5?     ----- Message -----  From: Dann Geller  Sent: 5/8/2023  12:34 PM CDT  To: Kidney Waitlisted Coordinator    Reschedule Existing Appointment        Appt Date: 5/5    Type of appt: HLA PRA screen    Physician: Frankie    Reason for rescheduling? Pt had to leave [facility] due to emergency; Requesting to r/s for soonest available    Caller: Self    Contact Preference: [phone]            Additional Information:  "Thank you for all that you do for our patients"       "

## 2023-05-08 NOTE — TELEPHONE ENCOUNTER
----- Message from Dre Baker sent at 5/8/2023  3:20 PM CDT -----  Regarding: Returning Call  Patient is returning a call from  for assistance with rescheduling a lab appt. Requested a call back to assist further.                           Contact: [phone]

## 2023-05-09 ENCOUNTER — LAB VISIT (OUTPATIENT)
Dept: LAB | Facility: HOSPITAL | Age: 58
End: 2023-05-09
Payer: COMMERCIAL

## 2023-05-09 DIAGNOSIS — Z76.82 PRE-KIDNEY TRANSPLANT, LISTED: ICD-10-CM

## 2023-05-09 PROCEDURE — 99001 SPECIMEN HANDLING PT-LAB: CPT | Mod: PO,TXP | Performed by: NURSE PRACTITIONER

## 2023-05-11 ENCOUNTER — OFFICE VISIT (OUTPATIENT)
Dept: CARDIOLOGY | Facility: CLINIC | Age: 58
End: 2023-05-11
Payer: COMMERCIAL

## 2023-05-11 VITALS
BODY MASS INDEX: 37.9 KG/M2 | SYSTOLIC BLOOD PRESSURE: 139 MMHG | DIASTOLIC BLOOD PRESSURE: 64 MMHG | HEART RATE: 61 BPM | WEIGHT: 205.94 LBS | HEIGHT: 62 IN

## 2023-05-11 DIAGNOSIS — E66.01 SEVERE OBESITY (BMI 35.0-35.9 WITH COMORBIDITY): ICD-10-CM

## 2023-05-11 DIAGNOSIS — N18.30 STAGE 3 CHRONIC KIDNEY DISEASE, UNSPECIFIED WHETHER STAGE 3A OR 3B CKD: ICD-10-CM

## 2023-05-11 DIAGNOSIS — Z91.89 AT RISK FOR CORONARY ARTERY DISEASE: ICD-10-CM

## 2023-05-11 DIAGNOSIS — N05.1 FSGS (FOCAL SEGMENTAL GLOMERULOSCLEROSIS): ICD-10-CM

## 2023-05-11 DIAGNOSIS — E78.00 HYPERCHOLESTEREMIA: Primary | ICD-10-CM

## 2023-05-11 DIAGNOSIS — N25.81 SECONDARY RENAL HYPERPARATHYROIDISM: ICD-10-CM

## 2023-05-11 DIAGNOSIS — N18.4 CKD (CHRONIC KIDNEY DISEASE) STAGE 4, GFR 15-29 ML/MIN: ICD-10-CM

## 2023-05-11 PROCEDURE — 99999 PR PBB SHADOW E&M-EST. PATIENT-LVL III: CPT | Mod: PBBFAC,TXP,, | Performed by: INTERNAL MEDICINE

## 2023-05-11 PROCEDURE — 99999 PR PBB SHADOW E&M-EST. PATIENT-LVL III: ICD-10-PCS | Mod: PBBFAC,TXP,, | Performed by: INTERNAL MEDICINE

## 2023-05-11 PROCEDURE — 99213 PR OFFICE/OUTPT VISIT, EST, LEVL III, 20-29 MIN: ICD-10-PCS | Mod: S$GLB,TXP,, | Performed by: INTERNAL MEDICINE

## 2023-05-11 PROCEDURE — 99213 OFFICE O/P EST LOW 20 MIN: CPT | Mod: S$GLB,TXP,, | Performed by: INTERNAL MEDICINE

## 2023-05-11 NOTE — PROGRESS NOTES
Subjective:   Patient ID:  Jacqui Leonard is a 57 y.o. female who presents for follow-up of Follow-up   Jacqui Leonard is a 57 y.o. 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   occasional palpitations or fluttering in the chest that makes her feel SOB   She has been told she has MV prolapse     Echo       Normal left ventricular cavity size.     Increased left ventricular wall thickness.     Calculated left ventricular ejection fraction by 2D tracking is 67%.     Grade I diastolic dysfunction (abnormal relaxation filling pattern), normal to mildly elevated filling pressures.     Normal left atrial size.     Mild mitral valve regurgitation.     Structurally normal trileaflet aortic valve.     Trace to mild tricuspid valve regurgitation.    Echo   NSR 67 bpm     Normal right heart size     Mild TR / PAsys ~ 24 mmHg     LA appears mildly enlarged / LAD 44 mm     Difficult assess MV anatomy - anterior MVL appears to billow     MR - moderate  /posteriorly directed     Mild LVH / EF 58%      Normal diastolic parameters     Normal aortic valve     No AS and no AR     No pericardial effusion       Echo     The left ventricle is normal in size with concentric remodeling and normal systolic function.  The estimated ejection fraction is 63%.  Normal left ventricular diastolic  "function.  Mild left atrial enlargement.  Normal right ventricular size with low normal right ventricular systolic function.  Normal central venous pressure (3 mmHg).  Trivial posterior pericardial effusion.    HPI:   He was taken off BP medication from nephrologist.   No chest pain, Orthopnea, PND of heart failure symptoms.   Denies palpitations or fluttering in the chest    NM stress test 12/22    Normal myocardial perfusion scan. There is no evidence of myocardial ischemia or infarction.    The gated perfusion images showed an ejection fraction of 65% at rest. The gated perfusion images showed an ejection fraction of 67% post stress. Normal ejection fraction is greater than 47%.    There is normal wall motion at rest and post stress.    LV cavity size is normal at rest and normal at stress.    The ECG portion of the study is negative for ischemia.    The patient reported no chest pain during the stress test.    There were no arrhythmias during stress.    There are no prior studies for comparison.     Carotid US  There is 0-19% right Internal Carotid Stenosis.  There is 0-19% left Internal Carotid Stenosis.       Patient Active Problem List   Diagnosis    CKD (chronic kidney disease) stage 3, GFR 30-59 ml/min    Persistent proteinuria    BMI 36.0-36.9,adult    Acquired cyst of kidney    Mass of uterine cervix    Pre-op testing    Hematocolpos    COVID    FSGS (focal segmental glomerulosclerosis)    Secondary renal hyperparathyroidism    CKD (chronic kidney disease) stage 4, GFR 15-29 ml/min    Mitral valve prolapse    Preoperative clearance    Severe obesity (BMI 35.0-35.9 with comorbidity)    Organ transplant candidate     /64 (BP Location: Right arm, Patient Position: Sitting)   Pulse 61   Ht 5' 2" (1.575 m)   Wt 93.4 kg (205 lb 14.6 oz)   BMI 37.66 kg/m²   Body mass index is 37.66 kg/m².  CrCl cannot be calculated (Patient's most recent lab result is older than the maximum 7 days allowed.).    Lab " Results   Component Value Date     04/05/2023    K 4.4 04/05/2023     04/05/2023    CO2 22 (L) 04/05/2023    BUN 48 (H) 04/05/2023    CREATININE 3.9 (H) 04/05/2023    GLU 73 04/05/2023    AST 16 11/03/2022    ALT 11 11/03/2022    ALBUMIN 3.4 (L) 04/05/2023    PROT 6.9 11/03/2022    BILITOT 0.3 11/03/2022    WBC 4.90 03/30/2023    HGB 10.8 (L) 03/30/2023    HCT 35.3 (L) 03/30/2023    MCV 94 03/30/2023     03/30/2023    INR 0.9 11/03/2022    TSH 2.150 05/07/2021    CHOL 141 04/05/2023    HDL 61 04/05/2023    LDLCALC 70.4 04/05/2023    TRIG 48 04/05/2023       Current Outpatient Medications   Medication Sig    calcitRIOL (ROCALTROL) 0.5 MCG Cap Take 1 capsule (0.5 mcg total) by mouth once daily.    clotrimazole-betamethasone 1-0.05% (LOTRISONE) cream Apply 1 g topically as needed.    omeprazole (PRILOSEC) 20 MG capsule Take 1 capsule (20 mg total) by mouth once daily.    rosuvastatin (CRESTOR) 20 MG tablet Take 1 tablet (20 mg total) by mouth every evening.     No current facility-administered medications for this visit.       Review of Systems   Constitutional: Negative for chills, decreased appetite, malaise/fatigue, night sweats, weight gain and weight loss.   Eyes:  Negative for blurred vision, double vision, visual disturbance and visual halos.   Cardiovascular:  Negative for chest pain, claudication, cyanosis, dyspnea on exertion, irregular heartbeat, leg swelling, near-syncope, orthopnea, palpitations, paroxysmal nocturnal dyspnea and syncope.   Respiratory:  Negative for cough, hemoptysis, snoring, sputum production and wheezing.    Endocrine: Negative for cold intolerance, heat intolerance, polydipsia and polyphagia.   Hematologic/Lymphatic: Negative for adenopathy and bleeding problem. Does not bruise/bleed easily.   Skin:  Negative for flushing, itching, poor wound healing and rash.   Musculoskeletal:  Negative for arthritis, back pain, falls, gout, joint pain, joint swelling, muscle  cramps, muscle weakness, myalgias, neck pain and stiffness.   Gastrointestinal:  Negative for bloating, abdominal pain, anorexia, diarrhea, dysphagia, excessive appetite, flatus, hematemesis, jaundice, melena and nausea.   Genitourinary:  Negative for hesitancy and incomplete emptying.   Neurological:  Negative for aphonia, brief paralysis, difficulty with concentration, disturbances in coordination, excessive daytime sleepiness, dizziness, focal weakness, light-headedness, loss of balance and weakness.   Psychiatric/Behavioral:  Negative for altered mental status, depression, hallucinations, hypervigilance, memory loss, substance abuse and suicidal ideas. The patient does not have insomnia and is not nervous/anxious.      Objective:   Physical Exam  Constitutional:       General: She is not in acute distress.     Appearance: She is well-developed. She is not diaphoretic.   HENT:      Head: Normocephalic and atraumatic.      Nose: Nose normal.      Mouth/Throat:      Pharynx: No oropharyngeal exudate.   Eyes:      General: No scleral icterus.        Right eye: No discharge.         Left eye: No discharge.      Conjunctiva/sclera: Conjunctivae normal.      Pupils: Pupils are equal, round, and reactive to light.   Neck:      Thyroid: No thyromegaly.      Vascular: No JVD.      Trachea: No tracheal deviation.   Cardiovascular:      Rate and Rhythm: Normal rate and regular rhythm.      Pulses: Intact distal pulses.      Heart sounds: Normal heart sounds. No murmur heard.    No friction rub. No gallop.   Pulmonary:      Effort: Pulmonary effort is normal. No respiratory distress.      Breath sounds: Normal breath sounds. No stridor. No wheezing or rales.   Chest:      Chest wall: No tenderness.   Abdominal:      General: Bowel sounds are normal. There is no distension.      Palpations: Abdomen is soft. There is no mass.      Tenderness: There is no abdominal tenderness. There is no guarding or rebound.   Musculoskeletal:          General: No tenderness. Normal range of motion.      Cervical back: Normal range of motion and neck supple.   Lymphadenopathy:      Cervical: No cervical adenopathy.   Skin:     General: Skin is warm.      Coloration: Skin is not pale.      Findings: No erythema or rash.   Neurological:      Mental Status: She is alert and oriented to person, place, and time.      Cranial Nerves: No cranial nerve deficit.      Motor: No abnormal muscle tone.      Coordination: Coordination normal.      Deep Tendon Reflexes: Reflexes are normal and symmetric. Reflexes normal.   Psychiatric:         Behavior: Behavior normal.         Thought Content: Thought content normal.         Judgment: Judgment normal.       Assessment:     1. Hypercholesteremia    2. Stage 3 chronic kidney disease, unspecified whether stage 3a or 3b CKD    3. CKD (chronic kidney disease) stage 4, GFR 15-29 ml/min    4. FSGS (focal segmental glomerulosclerosis)    5. BMI 36.0-36.9,adult    6. Secondary renal hyperparathyroidism    7. Severe obesity (BMI 35.0-35.9 with comorbidity)    8. At risk for coronary artery disease        Plan:   Jacqui was seen today for follow-up.    Diagnoses and all orders for this visit:    Hypercholesteremia    Stage 3 chronic kidney disease, unspecified whether stage 3a or 3b CKD    CKD (chronic kidney disease) stage 4, GFR 15-29 ml/min    FSGS (focal segmental glomerulosclerosis)    BMI 36.0-36.9,adult    Secondary renal hyperparathyroidism    Severe obesity (BMI 35.0-35.9 with comorbidity)    At risk for coronary artery disease      No change in medication continue meds as prescribed.

## 2023-05-22 LAB
CLASS I ANTIBODIES - LUMINEX: NORMAL
CLASS I ANTIBODY COMMENTS - LUMINEX: NORMAL
CLASS II ANTIBODIES - LUMINEX: NORMAL
CLASS II ANTIBODY COMMENTS - LUMINEX: NORMAL
CPRA %: 69
HLA FREEZE AND HOLD INTERPRETATION: NORMAL
HLAFH COLLECTION DATE: NORMAL
HPRA INTERPRETATION: NORMAL
SERUM COLLECTION DT - LUMINEX CLASS I: NORMAL
SERUM COLLECTION DT - LUMINEX CLASS II: NORMAL
SPCL1 TESTING DATE: NORMAL
SPCL2 TESTING DATE: NORMAL
SPCLU TESTING DATE: NORMAL

## 2023-05-31 ENCOUNTER — LAB VISIT (OUTPATIENT)
Dept: LAB | Facility: HOSPITAL | Age: 58
End: 2023-05-31
Attending: INTERNAL MEDICINE
Payer: COMMERCIAL

## 2023-05-31 DIAGNOSIS — N18.4 CKD (CHRONIC KIDNEY DISEASE) STAGE 4, GFR 15-29 ML/MIN: ICD-10-CM

## 2023-05-31 LAB
ALBUMIN SERPL BCP-MCNC: 3.4 G/DL (ref 3.5–5.2)
ANION GAP SERPL CALC-SCNC: 9 MMOL/L (ref 8–16)
BUN SERPL-MCNC: 47 MG/DL (ref 6–20)
CALCIUM SERPL-MCNC: 10 MG/DL (ref 8.7–10.5)
CHLORIDE SERPL-SCNC: 109 MMOL/L (ref 95–110)
CO2 SERPL-SCNC: 23 MMOL/L (ref 23–29)
CREAT SERPL-MCNC: 3.6 MG/DL (ref 0.5–1.4)
EST. GFR  (NO RACE VARIABLE): 14 ML/MIN/1.73 M^2
GLUCOSE SERPL-MCNC: 81 MG/DL (ref 70–110)
PHOSPHATE SERPL-MCNC: 4.6 MG/DL (ref 2.7–4.5)
POTASSIUM SERPL-SCNC: 4.2 MMOL/L (ref 3.5–5.1)
SODIUM SERPL-SCNC: 141 MMOL/L (ref 136–145)

## 2023-05-31 PROCEDURE — 80069 RENAL FUNCTION PANEL: CPT | Mod: NTX | Performed by: INTERNAL MEDICINE

## 2023-05-31 PROCEDURE — 36415 COLL VENOUS BLD VENIPUNCTURE: CPT | Mod: PO,TXP | Performed by: INTERNAL MEDICINE

## 2023-06-02 ENCOUNTER — OFFICE VISIT (OUTPATIENT)
Dept: NEPHROLOGY | Facility: CLINIC | Age: 58
End: 2023-06-02
Payer: COMMERCIAL

## 2023-06-02 DIAGNOSIS — R80.1 PERSISTENT PROTEINURIA: ICD-10-CM

## 2023-06-02 DIAGNOSIS — N28.1 ACQUIRED CYST OF KIDNEY: ICD-10-CM

## 2023-06-02 DIAGNOSIS — N25.81 SECONDARY RENAL HYPERPARATHYROIDISM: ICD-10-CM

## 2023-06-02 DIAGNOSIS — N18.4 CKD (CHRONIC KIDNEY DISEASE) STAGE 4, GFR 15-29 ML/MIN: Primary | ICD-10-CM

## 2023-06-02 PROCEDURE — 99214 PR OFFICE/OUTPT VISIT, EST, LEVL IV, 30-39 MIN: ICD-10-PCS | Mod: 95,,, | Performed by: INTERNAL MEDICINE

## 2023-06-02 PROCEDURE — 99214 OFFICE O/P EST MOD 30 MIN: CPT | Mod: 95,,, | Performed by: INTERNAL MEDICINE

## 2023-06-02 NOTE — PROGRESS NOTES
Subjective:       Patient ID: Jacqui Leonard is a 57 y.o. White female who presents for return patient evaluation for chronic renal failure.    The patient location is:  Patient Home   The chief complaint leading to consultation is: ckd  Visit type: Virtual visit with synchronous audio and video  Total time spent with patient: 24 minutes  Each patient to whom he or she provides medical services by telemedicine is:  (1) informed of the relationship between the physician and patient and the respective role of any other health care provider with respect to management of the patient; and (2) notified that he or she may decline to receive medical services by telemedicine and may withdraw from such care at any time.     She has fatigue and also has a great deal of stress with her job.  Her blood pressure at home has been 110-120/60-70.  There have been no recent illnesses, hospitalizations or procedures.  She had a renal biopsy in 2006.  She had COVID in June 2022.  She did have an ankle sprain late last year but has not been on NSAIDs.      Review of Systems   Constitutional:  Positive for fatigue. Negative for appetite change, chills and fever.   HENT:  Negative for congestion.    Eyes:  Negative for visual disturbance.   Respiratory:  Positive for shortness of breath (with exertion). Negative for cough.    Cardiovascular:  Negative for chest pain and leg swelling.   Gastrointestinal:  Negative for nausea.   Genitourinary:  Negative for difficulty urinating, dysuria and hematuria.   Musculoskeletal:  Positive for gait problem. Negative for arthralgias and back pain.   Skin:  Negative for rash.   Neurological:  Negative for headaches.   Psychiatric/Behavioral:  Positive for sleep disturbance. The patient is nervous/anxious.        The past medical, family and social histories were reviewed for this encounter     There were no vitals taken for this visit.    Objective:      Physical Exam  Vitals reviewed.    Constitutional:       General: She is not in acute distress.     Appearance: She is well-developed.   HENT:      Head: Normocephalic and atraumatic.   Eyes:      General: No scleral icterus.  Pulmonary:      Effort: Pulmonary effort is normal.   Neurological:      Mental Status: She is alert and oriented to person, place, and time.   Psychiatric:         Mood and Affect: Mood normal.         Behavior: Behavior normal.      Assessment:       1. CKD (chronic kidney disease) stage 4, GFR 15-29 ml/min    2. Persistent proteinuria    3. Acquired cyst of kidney    4. Secondary renal hyperparathyroidism        Lab Results   Component Value Date    CREATININE 3.6 (H) 05/31/2023    BUN 47 (H) 05/31/2023     05/31/2023    K 4.2 05/31/2023     05/31/2023    CO2 23 05/31/2023     Lab Results   Component Value Date    .6 (H) 03/30/2023    CALCIUM 10.0 05/31/2023    PHOS 4.6 (H) 05/31/2023     Lab Results   Component Value Date    HCT 35.3 (L) 03/30/2023     Prot/Creat Ratio, Urine   Date Value Ref Range Status   03/30/2023 3.02 (H) 0.00 - 0.20 Final   08/02/2022 5.38 (H) 0.00 - 0.20 Final   04/06/2022 1.92 (H) 0.00 - 0.20 Final     Plan:   Return to clinic in 2 months.  Labs for this week includes rp, pth, cbc, upc at the [place] location on weekday or [facility] on weekend ([facility]).   Baseline creatinine is 1.2-1.5 prior to 2016.  She has been 1.8-2.4 since 2020.  PTH is 151 with a calcium of 10.1.  Continue calcitriol 0.5 mcg daily.   UPC is 3.0.  She used to be on an ACE I but is not on one currently due to low blood pressure.  Re-biopsy was not done due to increased bleeding risk.  Renal US shows R 10.5 cm L 10.7 cm.  [facility] referral was been ordered/completed.  We discussed making lifestyle changes and using a Mediterranean diet for health benefits and weight loss.  She has been referred to Transplant and is listed.  She would consider PD if she needed it.  I will ask the PD nurse to contact her.

## 2023-06-05 RX ORDER — CALCITRIOL 0.5 UG/1
0.5 CAPSULE ORAL DAILY
Qty: 30 CAPSULE | Refills: 5 | Status: SHIPPED | OUTPATIENT
Start: 2023-06-05 | End: 2023-12-18

## 2023-06-07 ENCOUNTER — LAB VISIT (OUTPATIENT)
Dept: LAB | Facility: HOSPITAL | Age: 58
End: 2023-06-07
Payer: COMMERCIAL

## 2023-06-07 DIAGNOSIS — Z76.82 PRE-KIDNEY TRANSPLANT, LISTED: ICD-10-CM

## 2023-06-07 PROCEDURE — 86832 HLA CLASS I HIGH DEFIN QUAL: CPT | Mod: PO,TXP | Performed by: NURSE PRACTITIONER

## 2023-06-07 PROCEDURE — 86833 HLA CLASS II HIGH DEFIN QUAL: CPT | Mod: PO,TXP | Performed by: NURSE PRACTITIONER

## 2023-06-13 LAB — HPRA INTERPRETATION: NORMAL

## 2023-06-27 LAB
CLASS I ANTIBODY COMMENTS - LUMINEX: NORMAL
CLASS II ANTIBODIES - LUMINEX: NORMAL
CPRA %: 41
SERUM COLLECTION DT - LUMINEX CLASS I: NORMAL
SERUM COLLECTION DT - LUMINEX CLASS II: NORMAL
SPCL1 TESTING DATE: NORMAL
SPCL2 TESTING DATE: NORMAL
SPCLU TESTING DATE: NORMAL

## 2023-07-03 ENCOUNTER — TELEPHONE (OUTPATIENT)
Dept: NEPHROLOGY | Facility: CLINIC | Age: 58
End: 2023-07-03
Payer: COMMERCIAL

## 2023-07-03 DIAGNOSIS — N18.4 CKD (CHRONIC KIDNEY DISEASE) STAGE 4, GFR 15-29 ML/MIN: Primary | ICD-10-CM

## 2023-07-03 NOTE — TELEPHONE ENCOUNTER
Her concern is that she may have damaged her kidneys. Patient is having increased fatigue and episodes of low BP under 100 systolic.  Onset last week.  She has been drinking 80 or more oz QD but She felt dehydrated with dry lips, eyes, mouth, constipation.  Ludin shortness of breath at rest. She is feeling some better but still not herself. She is able to work.      Added [facility] orders to her tx lab tomorrow. RFP, CBC, PTH, UPC      Is soaking in epsoms salt harmful for her?

## 2023-07-03 NOTE — TELEPHONE ENCOUNTER
----- Message from Khadijah Fernandez sent at 7/3/2023  9:54 AM CDT -----  Contact: pt [phone]  Type: Needs Medical Advice  Who Called:  Pt     Best Call Back Number: [phone]    Additional Information: Pt requesting a call back to discuss issues she is having. No further info provided. Pls call back and advise

## 2023-07-05 ENCOUNTER — LAB VISIT (OUTPATIENT)
Dept: LAB | Facility: HOSPITAL | Age: 58
End: 2023-07-05
Payer: COMMERCIAL

## 2023-07-05 ENCOUNTER — PATIENT MESSAGE (OUTPATIENT)
Dept: TRANSPLANT | Facility: CLINIC | Age: 58
End: 2023-07-05
Payer: COMMERCIAL

## 2023-07-05 DIAGNOSIS — Z76.82 PRE-KIDNEY TRANSPLANT, LISTED: Primary | ICD-10-CM

## 2023-07-05 DIAGNOSIS — Z76.82 PRE-KIDNEY TRANSPLANT, LISTED: ICD-10-CM

## 2023-07-05 DIAGNOSIS — N18.4 CKD (CHRONIC KIDNEY DISEASE) STAGE 4, GFR 15-29 ML/MIN: ICD-10-CM

## 2023-07-05 LAB
ALBUMIN SERPL BCP-MCNC: 3.3 G/DL (ref 3.5–5.2)
ANION GAP SERPL CALC-SCNC: 7 MMOL/L (ref 8–16)
BASOPHILS # BLD AUTO: 0.03 K/UL (ref 0–0.2)
BASOPHILS NFR BLD: 0.6 % (ref 0–1.9)
BUN SERPL-MCNC: 62 MG/DL (ref 6–20)
CALCIUM SERPL-MCNC: 9.5 MG/DL (ref 8.7–10.5)
CHLORIDE SERPL-SCNC: 108 MMOL/L (ref 95–110)
CO2 SERPL-SCNC: 27 MMOL/L (ref 23–29)
CREAT SERPL-MCNC: 3.7 MG/DL (ref 0.5–1.4)
DIFFERENTIAL METHOD: ABNORMAL
EOSINOPHIL # BLD AUTO: 0.3 K/UL (ref 0–0.5)
EOSINOPHIL NFR BLD: 4.7 % (ref 0–8)
ERYTHROCYTE [DISTWIDTH] IN BLOOD BY AUTOMATED COUNT: 13.5 % (ref 11.5–14.5)
EST. GFR  (NO RACE VARIABLE): 13.6 ML/MIN/1.73 M^2
GLUCOSE SERPL-MCNC: 87 MG/DL (ref 70–110)
HCT VFR BLD AUTO: 33.5 % (ref 37–48.5)
HGB BLD-MCNC: 10.4 G/DL (ref 12–16)
IMM GRANULOCYTES # BLD AUTO: 0.01 K/UL (ref 0–0.04)
IMM GRANULOCYTES NFR BLD AUTO: 0.2 % (ref 0–0.5)
LYMPHOCYTES # BLD AUTO: 1.7 K/UL (ref 1–4.8)
LYMPHOCYTES NFR BLD: 31.2 % (ref 18–48)
MCH RBC QN AUTO: 28.9 PG (ref 27–31)
MCHC RBC AUTO-ENTMCNC: 31 G/DL (ref 32–36)
MCV RBC AUTO: 93 FL (ref 82–98)
MONOCYTES # BLD AUTO: 0.4 K/UL (ref 0.3–1)
MONOCYTES NFR BLD: 8.1 % (ref 4–15)
NEUTROPHILS # BLD AUTO: 2.9 K/UL (ref 1.8–7.7)
NEUTROPHILS NFR BLD: 55.2 % (ref 38–73)
NRBC BLD-RTO: 0 /100 WBC
PHOSPHATE SERPL-MCNC: 4.3 MG/DL (ref 2.7–4.5)
PLATELET # BLD AUTO: 214 K/UL (ref 150–450)
PMV BLD AUTO: 10.4 FL (ref 9.2–12.9)
POTASSIUM SERPL-SCNC: 5 MMOL/L (ref 3.5–5.1)
PTH-INTACT SERPL-MCNC: 206.1 PG/ML (ref 9–77)
RBC # BLD AUTO: 3.6 M/UL (ref 4–5.4)
SODIUM SERPL-SCNC: 142 MMOL/L (ref 136–145)
WBC # BLD AUTO: 5.29 K/UL (ref 3.9–12.7)

## 2023-07-05 PROCEDURE — 83970 ASSAY OF PARATHORMONE: CPT | Mod: NTX | Performed by: INTERNAL MEDICINE

## 2023-07-05 PROCEDURE — 85025 COMPLETE CBC W/AUTO DIFF WBC: CPT | Mod: NTX | Performed by: INTERNAL MEDICINE

## 2023-07-05 PROCEDURE — 36415 COLL VENOUS BLD VENIPUNCTURE: CPT | Mod: PO,TXP | Performed by: INTERNAL MEDICINE

## 2023-07-05 PROCEDURE — 80069 RENAL FUNCTION PANEL: CPT | Mod: NTX | Performed by: INTERNAL MEDICINE

## 2023-07-05 NOTE — TELEPHONE ENCOUNTER
Kidney blood test shows the same kidney function she has had since March.      She may need to be seen by Surgery to look into placing her PD catheter soon.    Please contact her for a symptoms check.

## 2023-08-02 ENCOUNTER — LAB VISIT (OUTPATIENT)
Dept: LAB | Facility: HOSPITAL | Age: 58
End: 2023-08-02
Payer: COMMERCIAL

## 2023-08-02 DIAGNOSIS — Z76.82 PRE-KIDNEY TRANSPLANT, LISTED: ICD-10-CM

## 2023-08-02 DIAGNOSIS — N18.4 CKD (CHRONIC KIDNEY DISEASE) STAGE 4, GFR 15-29 ML/MIN: ICD-10-CM

## 2023-08-02 LAB
ALBUMIN SERPL BCP-MCNC: 3.4 G/DL (ref 3.5–5.2)
ANION GAP SERPL CALC-SCNC: 12 MMOL/L (ref 8–16)
BASOPHILS # BLD AUTO: 0.02 K/UL (ref 0–0.2)
BASOPHILS NFR BLD: 0.5 % (ref 0–1.9)
BUN SERPL-MCNC: 67 MG/DL (ref 6–20)
CALCIUM SERPL-MCNC: 10.1 MG/DL (ref 8.7–10.5)
CHLORIDE SERPL-SCNC: 109 MMOL/L (ref 95–110)
CO2 SERPL-SCNC: 20 MMOL/L (ref 23–29)
CREAT SERPL-MCNC: 4.5 MG/DL (ref 0.5–1.4)
DIFFERENTIAL METHOD: ABNORMAL
EOSINOPHIL # BLD AUTO: 0.2 K/UL (ref 0–0.5)
EOSINOPHIL NFR BLD: 4.9 % (ref 0–8)
ERYTHROCYTE [DISTWIDTH] IN BLOOD BY AUTOMATED COUNT: 14 % (ref 11.5–14.5)
EST. GFR  (NO RACE VARIABLE): 10.7 ML/MIN/1.73 M^2
GLUCOSE SERPL-MCNC: 88 MG/DL (ref 70–110)
HCT VFR BLD AUTO: 33 % (ref 37–48.5)
HGB BLD-MCNC: 10.1 G/DL (ref 12–16)
IMM GRANULOCYTES # BLD AUTO: 0.01 K/UL (ref 0–0.04)
IMM GRANULOCYTES NFR BLD AUTO: 0.2 % (ref 0–0.5)
LYMPHOCYTES # BLD AUTO: 1.2 K/UL (ref 1–4.8)
LYMPHOCYTES NFR BLD: 27 % (ref 18–48)
MCH RBC QN AUTO: 29 PG (ref 27–31)
MCHC RBC AUTO-ENTMCNC: 30.6 G/DL (ref 32–36)
MCV RBC AUTO: 95 FL (ref 82–98)
MONOCYTES # BLD AUTO: 0.4 K/UL (ref 0.3–1)
MONOCYTES NFR BLD: 9.1 % (ref 4–15)
NEUTROPHILS # BLD AUTO: 2.5 K/UL (ref 1.8–7.7)
NEUTROPHILS NFR BLD: 58.3 % (ref 38–73)
NRBC BLD-RTO: 0 /100 WBC
PHOSPHATE SERPL-MCNC: 4.7 MG/DL (ref 2.7–4.5)
PLATELET # BLD AUTO: 231 K/UL (ref 150–450)
PMV BLD AUTO: 10.2 FL (ref 9.2–12.9)
POTASSIUM SERPL-SCNC: 4.7 MMOL/L (ref 3.5–5.1)
PTH-INTACT SERPL-MCNC: 118.8 PG/ML (ref 9–77)
RBC # BLD AUTO: 3.48 M/UL (ref 4–5.4)
SODIUM SERPL-SCNC: 141 MMOL/L (ref 136–145)
WBC # BLD AUTO: 4.29 K/UL (ref 3.9–12.7)

## 2023-08-02 PROCEDURE — 83970 ASSAY OF PARATHORMONE: CPT | Mod: NTX | Performed by: INTERNAL MEDICINE

## 2023-08-02 PROCEDURE — 36415 COLL VENOUS BLD VENIPUNCTURE: CPT | Mod: PO,NTX | Performed by: INTERNAL MEDICINE

## 2023-08-02 PROCEDURE — 80069 RENAL FUNCTION PANEL: CPT | Mod: NTX | Performed by: INTERNAL MEDICINE

## 2023-08-02 PROCEDURE — 85025 COMPLETE CBC W/AUTO DIFF WBC: CPT | Mod: TXP | Performed by: INTERNAL MEDICINE

## 2023-08-04 ENCOUNTER — OFFICE VISIT (OUTPATIENT)
Dept: NEPHROLOGY | Facility: CLINIC | Age: 58
End: 2023-08-04
Payer: COMMERCIAL

## 2023-08-04 DIAGNOSIS — R80.1 PERSISTENT PROTEINURIA: ICD-10-CM

## 2023-08-04 DIAGNOSIS — N25.81 SECONDARY RENAL HYPERPARATHYROIDISM: ICD-10-CM

## 2023-08-04 DIAGNOSIS — N28.1 ACQUIRED CYST OF KIDNEY: ICD-10-CM

## 2023-08-04 DIAGNOSIS — N05.1 FSGS (FOCAL SEGMENTAL GLOMERULOSCLEROSIS): ICD-10-CM

## 2023-08-04 DIAGNOSIS — N18.5 CKD (CHRONIC KIDNEY DISEASE) STAGE 5, GFR LESS THAN 15 ML/MIN: Primary | ICD-10-CM

## 2023-08-04 DIAGNOSIS — E66.01 SEVERE OBESITY: ICD-10-CM

## 2023-08-04 PROCEDURE — 99214 PR OFFICE/OUTPT VISIT, EST, LEVL IV, 30-39 MIN: ICD-10-PCS | Mod: 95,,, | Performed by: INTERNAL MEDICINE

## 2023-08-04 PROCEDURE — 99214 OFFICE O/P EST MOD 30 MIN: CPT | Mod: 95,,, | Performed by: INTERNAL MEDICINE

## 2023-08-04 NOTE — PROGRESS NOTES
Subjective:       Patient ID: Jacqui Leonard is a 57 y.o. White female who presents for return patient evaluation for chronic renal failure.    The patient location is:  Patient Home   The chief complaint leading to consultation is: ckd  Visit type: Virtual visit with synchronous audio and video  Total time spent with patient: 34 minutes  Each patient to whom he or she provides medical services by telemedicine is:  (1) informed of the relationship between the physician and patient and the respective role of any other health care provider with respect to management of the patient; and (2) notified that he or she may decline to receive medical services by telemedicine and may withdraw from such care at any time.     She has fatigue and also has a great deal of stress with her job.  Her blood pressure at home has been 110-120/60-70.  There have been no recent illnesses, hospitalizations or procedures.  She had a renal biopsy in 2006.  She had COVID in June 2022.      Review of Systems   Constitutional:  Positive for fatigue. Negative for appetite change, chills and fever.   HENT:  Negative for congestion.    Eyes:  Negative for visual disturbance.   Respiratory:  Positive for shortness of breath (with exertion). Negative for cough.    Cardiovascular:  Negative for chest pain and leg swelling.   Gastrointestinal:  Negative for nausea.   Genitourinary:  Negative for difficulty urinating, dysuria and hematuria.   Musculoskeletal:  Positive for gait problem. Negative for arthralgias and back pain.   Skin:  Negative for rash.   Neurological:  Negative for headaches.   Psychiatric/Behavioral:  Positive for sleep disturbance. The patient is nervous/anxious.        The past medical, family and social histories were reviewed for this encounter     There were no vitals taken for this visit.    Objective:      Physical Exam  Vitals reviewed.   Constitutional:       General: She is not in acute distress.      Appearance: She is well-developed.   HENT:      Head: Normocephalic and atraumatic.   Eyes:      General: No scleral icterus.  Pulmonary:      Effort: Pulmonary effort is normal.   Neurological:      Mental Status: She is alert and oriented to person, place, and time.   Psychiatric:         Mood and Affect: Mood normal.         Behavior: Behavior normal.        Assessment:       1. CKD (chronic kidney disease) stage 5, GFR less than 15 ml/min    2. Persistent proteinuria    3. Acquired cyst of kidney    4. Secondary renal hyperparathyroidism    5. FSGS (focal segmental glomerulosclerosis)    6. Severe obesity        Lab Results   Component Value Date    CREATININE 4.5 (H) 08/02/2023    BUN 67 (H) 08/02/2023     08/02/2023    K 4.7 08/02/2023     08/02/2023    CO2 20 (L) 08/02/2023     Lab Results   Component Value Date    .8 (H) 08/02/2023    CALCIUM 10.1 08/02/2023    PHOS 4.7 (H) 08/02/2023     Lab Results   Component Value Date    HCT 33.0 (L) 08/02/2023     Prot/Creat Ratio, Urine   Date Value Ref Range Status   03/30/2023 3.02 (H) 0.00 - 0.20 Final   08/02/2022 5.38 (H) 0.00 - 0.20 Final   04/06/2022 1.92 (H) 0.00 - 0.20 Final     Plan:   Return to clinic in 4-6 weeks.  Labs for this week includes rp at the [place] location on weekday or [facility] on weekend ([facility]).   Baseline creatinine is 1.2-1.5 prior to 2016.  She has been 1.8-2.4 since 2020.  PTH is 119 with a calcium of 10.1.  Continue calcitriol 0.5 mcg daily.   UPC is 3.0.  She used to be on an ACE I but is not on one currently due to low blood pressure.  Re-biopsy was not done due to increased bleeding risk.  Renal US shows R 10.5 cm L 10.7 cm.  [facility] referral was been ordered/completed.  We discussed making lifestyle changes and using a Mediterranean diet for health benefits and weight loss.  She has been referred to Transplant and is listed.  She would consider PD if she needed it.  I will ask the PD nurse to contact her.  She  could theoretically train on the [facility] and come see us on the [facility] for her visits if she wishes to stay with us.

## 2023-08-17 DIAGNOSIS — Z76.82 ORGAN TRANSPLANT CANDIDATE: Primary | ICD-10-CM

## 2023-08-28 ENCOUNTER — TELEPHONE (OUTPATIENT)
Dept: TRANSPLANT | Facility: CLINIC | Age: 58
End: 2023-08-28
Payer: COMMERCIAL

## 2023-08-28 NOTE — TELEPHONE ENCOUNTER
Spoke to pt confirming appts on 11/09/2023. Appt reminders were mailed on 08/28/2023 and pt is aware to bring care giver.

## 2023-08-28 NOTE — TELEPHONE ENCOUNTER
I have attempted without success to contact this patient by phone to schedule appts for test and clinic visit. Message was left to return call.

## 2023-08-29 ENCOUNTER — EPISODE CHANGES (OUTPATIENT)
Dept: TRANSPLANT | Facility: CLINIC | Age: 58
End: 2023-08-29

## 2023-08-30 DIAGNOSIS — Z76.82 ORGAN TRANSPLANT CANDIDATE: Primary | ICD-10-CM

## 2023-09-06 ENCOUNTER — LAB VISIT (OUTPATIENT)
Dept: LAB | Facility: HOSPITAL | Age: 58
End: 2023-09-06
Payer: COMMERCIAL

## 2023-09-06 DIAGNOSIS — Z76.82 PRE-KIDNEY TRANSPLANT, LISTED: ICD-10-CM

## 2023-09-06 PROCEDURE — 99001 SPECIMEN HANDLING PT-LAB: CPT | Mod: PO,TXP | Performed by: NURSE PRACTITIONER

## 2023-09-07 ENCOUNTER — LAB VISIT (OUTPATIENT)
Dept: LAB | Facility: HOSPITAL | Age: 58
End: 2023-09-07
Payer: COMMERCIAL

## 2023-09-07 DIAGNOSIS — Z76.82 ORGAN TRANSPLANT CANDIDATE: ICD-10-CM

## 2023-09-07 PROCEDURE — 86826 HLA X-MATCH NONCYTOTOXC ADDL: CPT | Mod: PO,TXP | Performed by: NURSE PRACTITIONER

## 2023-09-07 PROCEDURE — 86825 HLA X-MATH NON-CYTOTOXIC: CPT | Mod: PO,TXP | Performed by: NURSE PRACTITIONER

## 2023-09-07 PROCEDURE — 86826 HLA X-MATCH NONCYTOTOXC ADDL: CPT | Mod: 91,PO,TXP | Performed by: NURSE PRACTITIONER

## 2023-09-07 PROCEDURE — 86825 HLA X-MATH NON-CYTOTOXIC: CPT | Mod: 91,PO,TXP | Performed by: NURSE PRACTITIONER

## 2023-09-11 LAB — HPRA INTERPRETATION: NORMAL

## 2023-09-11 PROCEDURE — 86833 HLA CLASS II HIGH DEFIN QUAL: CPT | Mod: PO,TXP | Performed by: NURSE PRACTITIONER

## 2023-09-11 PROCEDURE — 86832 HLA CLASS I HIGH DEFIN QUAL: CPT | Mod: PO,TXP | Performed by: NURSE PRACTITIONER

## 2023-09-14 LAB
HLA FREEZE AND HOLD INTERPRETATION: NORMAL
HLAFH COLLECTION DATE: NORMAL
HPRA INTERPRETATION: NORMAL

## 2023-09-21 LAB
B CELL RESULTS - XM ALLO: POSITIVE
B CELL RESULTS - XM ALLO: POSITIVE
B MCS AVERAGE - XM ALLO: 168.9
B MCS AVERAGE - XM ALLO: 176.5
DONOR MRN: NORMAL
DONOR MRN: NORMAL
FXMAL TESTING DATE: NORMAL
FXMAL TESTING DATE: NORMAL
HLA FLOW CROSSMATCH (ALLO) INTERPRETATION: NORMAL
SERUM COLLECTION DT - XM ALLO: NORMAL
SERUM COLLECTION DT - XM ALLO: NORMAL
T CELL RESULTS - XM ALLO: NEGATIVE
T CELL RESULTS - XM ALLO: POSITIVE
T MCS AVERAGE - XM ALLO: 54.6
T MCS AVERAGE - XM ALLO: 59.1

## 2023-09-23 ENCOUNTER — PATIENT MESSAGE (OUTPATIENT)
Dept: NEPHROLOGY | Facility: CLINIC | Age: 58
End: 2023-09-23
Payer: COMMERCIAL

## 2023-09-23 ENCOUNTER — ON-DEMAND VIRTUAL (OUTPATIENT)
Dept: URGENT CARE | Facility: CLINIC | Age: 58
End: 2023-09-23
Payer: COMMERCIAL

## 2023-09-23 DIAGNOSIS — N18.4 CKD (CHRONIC KIDNEY DISEASE) STAGE 4, GFR 15-29 ML/MIN: ICD-10-CM

## 2023-09-23 DIAGNOSIS — N18.5 CKD (CHRONIC KIDNEY DISEASE) STAGE 5, GFR LESS THAN 15 ML/MIN: Primary | ICD-10-CM

## 2023-09-23 DIAGNOSIS — N30.00 ACUTE CYSTITIS WITHOUT HEMATURIA: Primary | ICD-10-CM

## 2023-09-23 PROCEDURE — 99214 PR OFFICE/OUTPT VISIT, EST, LEVL IV, 30-39 MIN: ICD-10-PCS | Mod: 95,TXP,, | Performed by: NURSE PRACTITIONER

## 2023-09-23 PROCEDURE — 99214 OFFICE O/P EST MOD 30 MIN: CPT | Mod: 95,TXP,, | Performed by: NURSE PRACTITIONER

## 2023-09-23 RX ORDER — DOXYCYCLINE 100 MG/1
100 CAPSULE ORAL 2 TIMES DAILY
Qty: 20 CAPSULE | Refills: 0 | Status: SHIPPED | OUTPATIENT
Start: 2023-09-23 | End: 2023-10-03

## 2023-09-23 NOTE — PROGRESS NOTES
Subjective:      Patient ID: Jacqui Leonard is a 57 y.o. female.    Vitals:  vitals were not taken for this visit.     Chief Complaint: Urinary Tract Infection      Visit Type: TELE AUDIOVISUAL    Present with the patient at the time of consultation: TELEMED PRESENT WITH PATIENT: None    Past Medical History:   Diagnosis Date    Abnormal Pap smear     Chronic kidney disease     - Stage 3    Depression     GERD (gastroesophageal reflux disease)     Mitral valve insufficiency     Mitral valve prolapse     Obesity     Sleep apnea     Thyroid disease      Past Surgical History:   Procedure Laterality Date    BUNIONECTOMY      CERVICAL BIOPSY  W/ LOOP ELECTRODE EXCISION      @ 2012 with all normal follow-ups    DILATION OF CERVIX N/A 10/22/2021    Procedure: DILATION, CERVIX;  Surgeon: Elvis Dutta MD;  Location: [facility] OR;  Service: OB/GYN;  Laterality: N/A;  evacuation of uterus, fluid    EXAMINATION UNDER ANESTHESIA N/A 10/22/2021    Procedure: Exam under anesthesia;  Surgeon: Elvis Dutta MD;  Location: [facility] OR;  Service: OB/GYN;  Laterality: N/A;    FOOT SURGERY      x2    TUBAL LIGATION       Review of patient's allergies indicates:  No Known Allergies  Current Outpatient Medications on File Prior to Visit   Medication Sig Dispense Refill    calcitRIOL (ROCALTROL) 0.5 MCG Cap TAKE 1 CAPSULE (0.5 MCG TOTAL) BY MOUTH ONCE DAILY. 30 capsule 5    clotrimazole-betamethasone 1-0.05% (LOTRISONE) cream Apply 1 g topically as needed.      omeprazole (PRILOSEC) 20 MG capsule Take 1 capsule (20 mg total) by mouth once daily. 90 capsule 1    rosuvastatin (CRESTOR) 20 MG tablet Take 1 tablet (20 mg total) by mouth every evening. 90 tablet 3     No current facility-administered medications on file prior to visit.     Family History   Problem Relation Age of Onset    Hypertension Mother     Heart attack Mother     No Known Problems Father     Bipolar disorder Daughter     Diabetes Maternal Uncle     Heart attack Maternal  Uncle     Heart attack Maternal Grandfather     Breast cancer Neg Hx     Colon cancer Neg Hx     Ovarian cancer Neg Hx        Medications Ordered                [id number] - [place], [place] - [address] [facility]   [address] [facility] [address]    Telephone: [phone]   Fax: [phone]   Hours: Not open 24 hours                         E-Prescribed (1 of 1)              doxycycline (VIBRAMYCIN) 100 MG Cap    Sig: Take 1 capsule (100 mg total) by mouth 2 (two) times daily. for 10 days       Start: 9/23/23     Quantity: 20 capsule Refills: 0                           Ohs Peq Odvv Intake    9/23/2023  9:28 AM CDT - Filed by Patient   Describe your reason for todays visit Having urinary issues burning pressure pain always ferling like i have to go only small amounts   What is your current physical address in the event of a medical emergency? [facility] [facility]   Are you able to take your vital signs? Yes   Systolic Blood Pressure: 123   Diastolic Blood Pressure: 77   Weight:    Height: 64   Pulse: 55   Temperature: 95.4   Respiration rate:    Pulse Oxygen:    Please attach any relevant images or files          57 female with chronic kidney disease with c/o burning on urination, decreased urination and pressure. She states hesitancy. No discharge. No fever. No hematuria    Urinary Tract Infection   Associated symptoms include frequency, hematuria and urgency.       Constitution: Negative.   HENT: Negative.     Cardiovascular: Negative.    Respiratory: Negative.     Gastrointestinal: Negative.    Endocrine: negative.   Genitourinary:  Positive for dysuria, frequency, urgency, urine decreased and hematuria.   Musculoskeletal: Negative.    Skin: Negative.    Allergic/Immunologic: Negative.    Neurological: Negative.    Hematologic/Lymphatic: Negative.    Psychiatric/Behavioral: Negative.          Objective:   The physical exam was conducted virtually.  Physical Exam   Constitutional: She is  oriented to person, place, and time. She appears well-developed.   HENT:   Head: Normocephalic and atraumatic.   Ears:   Right Ear: Hearing, tympanic membrane and external ear normal.   Left Ear: Hearing, tympanic membrane and external ear normal.   Nose: Nose normal.   Mouth/Throat: Uvula is midline, oropharynx is clear and moist and mucous membranes are normal.   Eyes: Conjunctivae and EOM are normal. Pupils are equal, round, and reactive to light.   Neck: Neck supple.   Cardiovascular: Normal rate.   Pulmonary/Chest: Effort normal and breath sounds normal.   Musculoskeletal: Normal range of motion.         General: Normal range of motion.   Neurological: She is alert and oriented to person, place, and time.   Skin: Skin is warm.   Psychiatric: Her behavior is normal. Thought content normal.   Nursing note and vitals reviewed.      Assessment:     1. Acute cystitis without hematuria    2. CKD (chronic kidney disease) stage 4, GFR 15-29 ml/min        Plan:   Follow up with nephrologist in two days.  Start doxycycline 100mg bid 10 - will use doxycycline due to no renal adjustment needed.     Acute cystitis without hematuria  -     doxycycline (VIBRAMYCIN) 100 MG Cap; Take 1 capsule (100 mg total) by mouth 2 (two) times daily. for 10 days  Dispense: 20 capsule; Refill: 0    CKD (chronic kidney disease) stage 4, GFR 15-29 ml/min        Patient Instructions   PLEASE READ YOUR DISCHARGE INSTRUCTIONS ENTIRELY AS IT CONTAINS IMPORTANT INFORMATION.      Take the antibiotics to completion.     Drink plenty of fluids, wipe front to back, take showers not baths, no scented soaps, wear breathable cotton underwear, urinate after sexual intercourse.     A urine culture was sent. You will be contacted once it results and appropriate action will be taken if needed.     Take the pyridium three times a day with meals. It will turn your urine orange. You do not need to take the whole prescription you can stop this once the pain is  better and finish out the antibiotics    Please go to the ER for worsening symptoms including fever, worsening flank pain, vomiting, etc.       Please return or see your primary care doctor if you develop new or worsening symptoms.     Please arrange follow up with your primary medical clinic as soon as possible. You must understand that you've received an Urgent Care treatment only and that you may be released before all of your medical problems are known or treated. You, the patient, will arrange for follow up as instructed. If your symptoms worsen or fail to improve you should go to the Emergency Room.  WE CANNOT RULE OUT ALL POSSIBLE CAUSES OF YOUR SYMPTOMS IN THE URGENT CARE SETTING PLEASE GO TO THE ER IF YOU FEELS YOUR CONDITION IS WORSENING OR YOU WOULD LIKE EMERGENT EVALUATION.

## 2023-09-27 RX ORDER — CEFUROXIME AXETIL 250 MG/1
250 TABLET ORAL EVERY 12 HOURS
Qty: 14 TABLET | Refills: 0 | Status: SHIPPED | OUTPATIENT
Start: 2023-09-27 | End: 2024-01-12

## 2023-10-04 ENCOUNTER — LAB VISIT (OUTPATIENT)
Dept: LAB | Facility: HOSPITAL | Age: 58
End: 2023-10-04
Payer: COMMERCIAL

## 2023-10-04 DIAGNOSIS — Z76.82 PRE-KIDNEY TRANSPLANT, LISTED: ICD-10-CM

## 2023-10-04 DIAGNOSIS — N18.5 CKD (CHRONIC KIDNEY DISEASE) STAGE 5, GFR LESS THAN 15 ML/MIN: ICD-10-CM

## 2023-10-04 LAB
ALBUMIN SERPL BCP-MCNC: 3.2 G/DL (ref 3.5–5.2)
ANION GAP SERPL CALC-SCNC: 8 MMOL/L (ref 8–16)
BUN SERPL-MCNC: 65 MG/DL (ref 6–20)
CALCIUM SERPL-MCNC: 9.8 MG/DL (ref 8.7–10.5)
CHLORIDE SERPL-SCNC: 114 MMOL/L (ref 95–110)
CO2 SERPL-SCNC: 20 MMOL/L (ref 23–29)
CREAT SERPL-MCNC: 3.8 MG/DL (ref 0.5–1.4)
EST. GFR  (NO RACE VARIABLE): 13.2 ML/MIN/1.73 M^2
GLUCOSE SERPL-MCNC: 90 MG/DL (ref 70–110)
PHOSPHATE SERPL-MCNC: 4.8 MG/DL (ref 2.7–4.5)
POTASSIUM SERPL-SCNC: 4.2 MMOL/L (ref 3.5–5.1)
SODIUM SERPL-SCNC: 142 MMOL/L (ref 136–145)

## 2023-10-04 PROCEDURE — 86832 HLA CLASS I HIGH DEFIN QUAL: CPT | Mod: PO,TXP | Performed by: NURSE PRACTITIONER

## 2023-10-04 PROCEDURE — 86833 HLA CLASS II HIGH DEFIN QUAL: CPT | Mod: PO,TXP | Performed by: NURSE PRACTITIONER

## 2023-10-04 PROCEDURE — 80069 RENAL FUNCTION PANEL: CPT | Mod: TXP | Performed by: INTERNAL MEDICINE

## 2023-10-04 PROCEDURE — 36415 COLL VENOUS BLD VENIPUNCTURE: CPT | Mod: PO,TXP | Performed by: INTERNAL MEDICINE

## 2023-10-05 LAB
CLASS I ANTIBODIES - LUMINEX: NORMAL
CLASS I ANTIBODY COMMENTS - LUMINEX: NORMAL
CLASS II ANTIBODIES - LUMINEX: NORMAL
CLASS II ANTIBODY COMMENTS - LUMINEX: NORMAL
CPRA %: 67
SERUM COLLECTION DT - LUMINEX CLASS I: NORMAL
SERUM COLLECTION DT - LUMINEX CLASS II: NORMAL
SPCL1 TESTING DATE: NORMAL
SPCL2 TESTING DATE: NORMAL
SPCLU TESTING DATE: NORMAL

## 2023-10-07 LAB — HPRA INTERPRETATION: NORMAL

## 2023-11-01 ENCOUNTER — LAB VISIT (OUTPATIENT)
Dept: LAB | Facility: HOSPITAL | Age: 58
End: 2023-11-01
Payer: COMMERCIAL

## 2023-11-01 DIAGNOSIS — Z76.82 PRE-KIDNEY TRANSPLANT, LISTED: ICD-10-CM

## 2023-11-01 DIAGNOSIS — N18.5 CKD (CHRONIC KIDNEY DISEASE) STAGE 5, GFR LESS THAN 15 ML/MIN: ICD-10-CM

## 2023-11-01 LAB
ALBUMIN SERPL BCP-MCNC: 3.3 G/DL (ref 3.5–5.2)
ANION GAP SERPL CALC-SCNC: 12 MMOL/L (ref 8–16)
BUN SERPL-MCNC: 69 MG/DL (ref 6–20)
CALCIUM SERPL-MCNC: 10.2 MG/DL (ref 8.7–10.5)
CHLORIDE SERPL-SCNC: 110 MMOL/L (ref 95–110)
CO2 SERPL-SCNC: 20 MMOL/L (ref 23–29)
CREAT SERPL-MCNC: 4.3 MG/DL (ref 0.5–1.4)
EST. GFR  (NO RACE VARIABLE): 11.3 ML/MIN/1.73 M^2
GLUCOSE SERPL-MCNC: 76 MG/DL (ref 70–110)
PHOSPHATE SERPL-MCNC: 5 MG/DL (ref 2.7–4.5)
POTASSIUM SERPL-SCNC: 4.5 MMOL/L (ref 3.5–5.1)
SODIUM SERPL-SCNC: 142 MMOL/L (ref 136–145)

## 2023-11-01 PROCEDURE — 99001 SPECIMEN HANDLING PT-LAB: CPT | Mod: PO,TXP | Performed by: NURSE PRACTITIONER

## 2023-11-01 PROCEDURE — 36415 COLL VENOUS BLD VENIPUNCTURE: CPT | Mod: PO,NTX | Performed by: INTERNAL MEDICINE

## 2023-11-01 PROCEDURE — 80069 RENAL FUNCTION PANEL: CPT | Mod: NTX | Performed by: INTERNAL MEDICINE

## 2023-11-02 ENCOUNTER — LAB VISIT (OUTPATIENT)
Dept: LAB | Facility: HOSPITAL | Age: 58
End: 2023-11-02
Attending: FAMILY MEDICINE
Payer: COMMERCIAL

## 2023-11-02 ENCOUNTER — OFFICE VISIT (OUTPATIENT)
Dept: FAMILY MEDICINE | Facility: CLINIC | Age: 58
End: 2023-11-02
Payer: COMMERCIAL

## 2023-11-02 DIAGNOSIS — R35.0 URINARY FREQUENCY: Primary | ICD-10-CM

## 2023-11-02 DIAGNOSIS — R35.0 URINARY FREQUENCY: ICD-10-CM

## 2023-11-02 LAB
BACTERIA #/AREA URNS AUTO: ABNORMAL /HPF
BILIRUB UR QL STRIP: NEGATIVE
CLARITY UR REFRACT.AUTO: CLEAR
COLOR UR AUTO: YELLOW
GLUCOSE UR QL STRIP: ABNORMAL
HGB UR QL STRIP: ABNORMAL
HYALINE CASTS UR QL AUTO: 2 /LPF
KETONES UR QL STRIP: NEGATIVE
LEUKOCYTE ESTERASE UR QL STRIP: ABNORMAL
MICROSCOPIC COMMENT: ABNORMAL
NITRITE UR QL STRIP: NEGATIVE
PH UR STRIP: 6 [PH] (ref 5–8)
PROT UR QL STRIP: ABNORMAL
RBC #/AREA URNS AUTO: 1 /HPF (ref 0–4)
SP GR UR STRIP: 1.01 (ref 1–1.03)
SQUAMOUS #/AREA URNS AUTO: 3 /HPF
URN SPEC COLLECT METH UR: ABNORMAL
WBC #/AREA URNS AUTO: 41 /HPF (ref 0–5)

## 2023-11-02 PROCEDURE — 99214 OFFICE O/P EST MOD 30 MIN: CPT | Mod: 95,,, | Performed by: FAMILY MEDICINE

## 2023-11-02 PROCEDURE — 81001 URINALYSIS AUTO W/SCOPE: CPT | Mod: TXP | Performed by: FAMILY MEDICINE

## 2023-11-02 PROCEDURE — 87077 CULTURE AEROBIC IDENTIFY: CPT | Mod: NTX | Performed by: FAMILY MEDICINE

## 2023-11-02 PROCEDURE — 87086 URINE CULTURE/COLONY COUNT: CPT | Mod: TXP | Performed by: FAMILY MEDICINE

## 2023-11-02 PROCEDURE — 87088 URINE BACTERIA CULTURE: CPT | Mod: TXP | Performed by: FAMILY MEDICINE

## 2023-11-02 PROCEDURE — 99214 PR OFFICE/OUTPT VISIT, EST, LEVL IV, 30-39 MIN: ICD-10-PCS | Mod: 95,,, | Performed by: FAMILY MEDICINE

## 2023-11-02 PROCEDURE — 87186 SC STD MICRODIL/AGAR DIL: CPT | Mod: TXP | Performed by: FAMILY MEDICINE

## 2023-11-03 ENCOUNTER — TELEPHONE (OUTPATIENT)
Dept: FAMILY MEDICINE | Facility: CLINIC | Age: 58
End: 2023-11-03
Payer: COMMERCIAL

## 2023-11-03 ENCOUNTER — PATIENT MESSAGE (OUTPATIENT)
Dept: FAMILY MEDICINE | Facility: CLINIC | Age: 58
End: 2023-11-03
Payer: COMMERCIAL

## 2023-11-03 DIAGNOSIS — N39.0 URINARY TRACT INFECTION WITHOUT HEMATURIA, SITE UNSPECIFIED: Primary | ICD-10-CM

## 2023-11-03 RX ORDER — AMOXICILLIN AND CLAVULANATE POTASSIUM 500; 125 MG/1; MG/1
1 TABLET, FILM COATED ORAL DAILY
Qty: 7 TABLET | Refills: 0 | Status: SHIPPED | OUTPATIENT
Start: 2023-11-03 | End: 2024-01-12

## 2023-11-03 NOTE — TELEPHONE ENCOUNTER
----- Message from Taya Baker sent at 11/3/2023  1:14 PM CDT -----  Type:  Test Results    Who Called:  self     Name of Test (Lab/Mammo/Etc):  Urine    Date of Test:  11/02/2023    Ordering Provider:  Dr Flanagan     Where the test was performed:  [facility]    Would the patient rather a call back or a response via [facility]?  call    Best Call Back Number:  [phone] (home)      Additional Information:       For Clinical Team:Has the provider reviewed the results?

## 2023-11-03 NOTE — PROGRESS NOTES
[facility]  Progress Note    SUBJECTIVE:     Chief Complaint   Patient presents with    Urinary Frequency       The patient location is: Home  The chief complaint leading to consultation is: Urinary Frequency    Visit type: Virtual visit with synchronous audio and video  Total time spent with patient: 25  Each patient to whom he or she provides medical services by telemedicine is:  (1) informed of the relationship between the physician and patient and the respective role of any other health care provider with respect to management of the patient; and (2) notified that he or she may decline to receive medical services by telemedicine and may withdraw from such care at any time.      HPI: Patient is a 57 y.o. female via virtual visit, here for c/o increasing urinary frequency for the past week. No dysuria, fevers, chills. Has some pelvic pressure but no flank pain. Had couple episodes of UTI recently dx which she was given doxy and ceftin which helped but it came back. Pt is CKD5.    Review of patient's allergies indicates:  No Known Allergies    Past Medical History:   Diagnosis Date    Abnormal Pap smear     Chronic kidney disease     - Stage 3    Depression     GERD (gastroesophageal reflux disease)     Mitral valve insufficiency     Mitral valve prolapse     Obesity     Sleep apnea     Thyroid disease      Past Surgical History:   Procedure Laterality Date    BUNIONECTOMY      CERVICAL BIOPSY  W/ LOOP ELECTRODE EXCISION      @ 2012 with all normal follow-ups    DILATION OF CERVIX N/A 10/22/2021    Procedure: DILATION, CERVIX;  Surgeon: Elvis Dutta MD;  Location: [facility] OR;  Service: OB/GYN;  Laterality: N/A;  evacuation of uterus, fluid    EXAMINATION UNDER ANESTHESIA N/A 10/22/2021    Procedure: Exam under anesthesia;  Surgeon: Elvis Dutta MD;  Location: [facility] OR;  Service: OB/GYN;  Laterality: N/A;    FOOT SURGERY      x2    TUBAL LIGATION       Family History   Problem Relation Age of Onset     Hypertension Mother     Heart attack Mother     No Known Problems Father     Bipolar disorder Daughter     Diabetes Maternal Uncle     Heart attack Maternal Uncle     Heart attack Maternal Grandfather     Breast cancer Neg Hx     Colon cancer Neg Hx     Ovarian cancer Neg Hx      Social History     Tobacco Use    Smoking status: Former     Current packs/day: 0.00     Average packs/day: 0.5 packs/day for 20.0 years (10.0 ttl pk-yrs)     Types: Cigarettes     Start date: 1990     Quit date: 2010     Years since quittin.8    Smokeless tobacco: Never   Substance Use Topics    Alcohol use: Not Currently     Alcohol/week: 3.0 - 4.0 standard drinks of alcohol     Types: 3 - 4 Shots of liquor per week     Comment: sober 1 year    Drug use: No        Review of Systems   HENT:  Negative for hearing loss.    Eyes:  Negative for discharge.   Respiratory:  Negative for wheezing.    Cardiovascular:  Negative for chest pain and palpitations.   Gastrointestinal:  Negative for blood in stool, constipation, diarrhea and vomiting.   Genitourinary:  Positive for frequency and urgency. Negative for dysuria, flank pain and hematuria.   Musculoskeletal:  Negative for neck pain.   Neurological:  Negative for weakness and headaches.   Endo/Heme/Allergies:  Negative for polydipsia.     OBJECTIVE:   There were no vitals filed for this visit.  There is no height or weight on file to calculate BMI.    Physical Exam    Old records were reviewed. Labs and/or images were independently reviewed.    ASSESSMENT     1. Urinary frequency        PLAN:     Urinary frequency  -     Urinalysis; Future  -     Urine culture; Future; Expected date: 2023  -     check UA/UC. If UTI will try augmentin.    Addemdum: UTI showing wbc/leuks. Sent augmentin to pharmacy.      RTC PRN    Oleg Flanagan MD  2023 1:14 PM        Answers submitted by the patient for this visit:  Review of Systems Questionnaire (Submitted on 2023)  activity  change: No  unexpected weight change: No  rhinorrhea: No  trouble swallowing: No  visual disturbance: No  chest tightness: No  polyuria: Yes  difficulty urinating: No  menstrual problem: No  joint swelling: No  arthralgias: No  confusion: No  dysphoric mood: No

## 2023-11-04 LAB — BACTERIA UR CULT: ABNORMAL

## 2023-11-06 ENCOUNTER — TELEPHONE (OUTPATIENT)
Dept: TRANSPLANT | Facility: CLINIC | Age: 58
End: 2023-11-06
Payer: COMMERCIAL

## 2023-11-08 ENCOUNTER — TELEPHONE (OUTPATIENT)
Dept: TRANSPLANT | Facility: CLINIC | Age: 58
End: 2023-11-08
Payer: COMMERCIAL

## 2023-11-08 NOTE — TELEPHONE ENCOUNTER
----- Message from Arnie Benson sent at 11/8/2023 11:46 AM CST -----  Regarding: call back  Pt call to speak with someone in regards to her upcoming appt for 11/09 requesting call back     Call

## 2023-11-08 NOTE — TELEPHONE ENCOUNTER
Patient wanted to speak with a  confirming her upcoming appt time because she no longer saw her 8am appt.

## 2023-11-09 ENCOUNTER — HOSPITAL ENCOUNTER (OUTPATIENT)
Dept: RADIOLOGY | Facility: HOSPITAL | Age: 58
Discharge: HOME | End: 2023-11-09
Attending: NURSE PRACTITIONER
Payer: COMMERCIAL

## 2023-11-09 ENCOUNTER — TELEPHONE (OUTPATIENT)
Dept: TRANSPLANT | Facility: CLINIC | Age: 58
End: 2023-11-09
Payer: COMMERCIAL

## 2023-11-09 ENCOUNTER — OFFICE VISIT (OUTPATIENT)
Dept: TRANSPLANT | Facility: CLINIC | Age: 58
End: 2023-11-09
Payer: COMMERCIAL

## 2023-11-09 ENCOUNTER — HOSPITAL ENCOUNTER (OUTPATIENT)
Dept: CARDIOLOGY | Facility: HOSPITAL | Age: 58
Discharge: HOME | End: 2023-11-09
Attending: NURSE PRACTITIONER
Payer: COMMERCIAL

## 2023-11-09 VITALS
BODY MASS INDEX: 33.94 KG/M2 | HEIGHT: 63 IN | OXYGEN SATURATION: 100 % | TEMPERATURE: 97 F | DIASTOLIC BLOOD PRESSURE: 73 MMHG | HEART RATE: 67 BPM | SYSTOLIC BLOOD PRESSURE: 138 MMHG | RESPIRATION RATE: 16 BRPM | WEIGHT: 191.56 LBS

## 2023-11-09 VITALS
WEIGHT: 205 LBS | HEART RATE: 64 BPM | BODY MASS INDEX: 37.73 KG/M2 | DIASTOLIC BLOOD PRESSURE: 82 MMHG | HEIGHT: 62 IN | SYSTOLIC BLOOD PRESSURE: 132 MMHG

## 2023-11-09 DIAGNOSIS — N25.81 SECONDARY RENAL HYPERPARATHYROIDISM: ICD-10-CM

## 2023-11-09 DIAGNOSIS — N18.5 STAGE 5 CHRONIC KIDNEY DISEASE NOT ON CHRONIC DIALYSIS: ICD-10-CM

## 2023-11-09 DIAGNOSIS — Z76.82 ORGAN TRANSPLANT CANDIDATE: ICD-10-CM

## 2023-11-09 DIAGNOSIS — N05.1 FSGS (FOCAL SEGMENTAL GLOMERULOSCLEROSIS): ICD-10-CM

## 2023-11-09 DIAGNOSIS — Z76.82 PATIENT ON WAITING LIST FOR KIDNEY TRANSPLANT: Primary | ICD-10-CM

## 2023-11-09 DIAGNOSIS — E66.01 CLASS 2 SEVERE OBESITY DUE TO EXCESS CALORIES WITH SERIOUS COMORBIDITY AND BODY MASS INDEX (BMI) OF 37.0 TO 37.9 IN ADULT: ICD-10-CM

## 2023-11-09 PROBLEM — Z01.818 PREOPERATIVE CLEARANCE: Status: RESOLVED | Noted: 2022-12-28 | Resolved: 2023-11-09

## 2023-11-09 PROBLEM — Z01.818 PRE-OP TESTING: Status: RESOLVED | Noted: 2021-10-22 | Resolved: 2023-11-09

## 2023-11-09 PROBLEM — E66.812 CLASS 2 SEVERE OBESITY DUE TO EXCESS CALORIES WITH SERIOUS COMORBIDITY AND BODY MASS INDEX (BMI) OF 37.0 TO 37.9 IN ADULT: Status: ACTIVE | Noted: 2022-12-28

## 2023-11-09 PROBLEM — U07.1 COVID: Status: RESOLVED | Noted: 2022-06-30 | Resolved: 2023-11-09

## 2023-11-09 LAB
ASCENDING AORTA: 3.44 CM
AV INDEX (PROSTH): 0.7
AV MEAN GRADIENT: 5 MMHG
AV PEAK GRADIENT: 8 MMHG
AV VALVE AREA BY VELOCITY RATIO: 1.82 CM²
AV VALVE AREA: 2.13 CM²
AV VELOCITY RATIO: 0.6
BSA FOR ECHO PROCEDURE: 2.02 M2
CV ECHO LV RWT: 0.36 CM
DOP CALC AO PEAK VEL: 1.37 M/S
DOP CALC AO VTI: 23.61 CM
DOP CALC LVOT AREA: 3 CM2
DOP CALC LVOT DIAMETER: 1.97 CM
DOP CALC LVOT PEAK VEL: 0.82 M/S
DOP CALC LVOT STROKE VOLUME: 50.27 CM3
DOP CALCLVOT PEAK VEL VTI: 16.5 CM
E WAVE DECELERATION TIME: 248.45 MSEC
E/A RATIO: 1.53
E/E' RATIO: 8.12 M/S
ECHO LV POSTERIOR WALL: 0.91 CM (ref 0.6–1.1)
FRACTIONAL SHORTENING: 40 % (ref 28–44)
INTERVENTRICULAR SEPTUM: 0.72 CM (ref 0.6–1.1)
LA MAJOR: 4.33 CM
LA MINOR: 4 CM
LA WIDTH: 4.06 CM
LEFT ATRIUM SIZE: 4 CM
LEFT ATRIUM VOLUME INDEX MOD: 21.8 ML/M2
LEFT ATRIUM VOLUME INDEX: 29.7 ML/M2
LEFT ATRIUM VOLUME MOD: 41.98 CM3
LEFT ATRIUM VOLUME: 57.4 CM3
LEFT INTERNAL DIMENSION IN SYSTOLE: 3.06 CM (ref 2.1–4)
LEFT VENTRICLE DIASTOLIC VOLUME INDEX: 63.5 ML/M2
LEFT VENTRICLE DIASTOLIC VOLUME: 122.56 ML
LEFT VENTRICLE MASS INDEX: 74 G/M2
LEFT VENTRICLE SYSTOLIC VOLUME INDEX: 19 ML/M2
LEFT VENTRICLE SYSTOLIC VOLUME: 36.61 ML
LEFT VENTRICULAR INTERNAL DIMENSION IN DIASTOLE: 5.08 CM (ref 3.5–6)
LEFT VENTRICULAR MASS: 142.88 G
LV LATERAL E/E' RATIO: 6.9 M/S
LV SEPTAL E/E' RATIO: 9.86 M/S
MV PEAK A VEL: 0.45 M/S
MV PEAK E VEL: 0.69 M/S
MV STENOSIS PRESSURE HALF TIME: 72.05 MS
MV VALVE AREA P 1/2 METHOD: 3.05 CM2
RA MAJOR: 4.12 CM
RA PRESSURE ESTIMATED: 3 MMHG
RA WIDTH: 2.98 CM
RIGHT VENTRICULAR END-DIASTOLIC DIMENSION: 3.26 CM
SINUS: 2.71 CM
STJ: 2.47 CM
TDI LATERAL: 0.1 M/S
TDI SEPTAL: 0.07 M/S
TDI: 0.09 M/S
TRICUSPID ANNULAR PLANE SYSTOLIC EXCURSION: 1.68 CM
Z-SCORE OF LEFT VENTRICULAR DIMENSION IN END DIASTOLE: -0.73
Z-SCORE OF LEFT VENTRICULAR DIMENSION IN END SYSTOLE: -0.75

## 2023-11-09 PROCEDURE — 76770 US EXAM ABDO BACK WALL COMP: CPT | Mod: 26,TXP,, | Performed by: STUDENT IN AN ORGANIZED HEALTH CARE EDUCATION/TRAINING PROGRAM

## 2023-11-09 PROCEDURE — 71046 X-RAY EXAM CHEST 2 VIEWS: CPT | Mod: 26,TXP,, | Performed by: RADIOLOGY

## 2023-11-09 PROCEDURE — 71046 X-RAY EXAM CHEST 2 VIEWS: CPT | Mod: TC,TXP

## 2023-11-09 PROCEDURE — 71046 XR CHEST PA AND LATERAL: ICD-10-PCS | Mod: 26,TXP,, | Performed by: RADIOLOGY

## 2023-11-09 PROCEDURE — 99999 PR PBB SHADOW E&M-EST. PATIENT-LVL IV: CPT | Mod: PBBFAC,TXP,, | Performed by: NURSE PRACTITIONER

## 2023-11-09 PROCEDURE — 99215 PR OFFICE/OUTPT VISIT, EST, LEVL V, 40-54 MIN: ICD-10-PCS | Mod: S$GLB,TXP,, | Performed by: NURSE PRACTITIONER

## 2023-11-09 PROCEDURE — 76770 US EXAM ABDO BACK WALL COMP: CPT | Mod: TC,TXP

## 2023-11-09 PROCEDURE — 93306 TTE W/DOPPLER COMPLETE: CPT | Mod: TXP

## 2023-11-09 PROCEDURE — 99215 OFFICE O/P EST HI 40 MIN: CPT | Mod: S$GLB,TXP,, | Performed by: NURSE PRACTITIONER

## 2023-11-09 PROCEDURE — 93306 TTE W/DOPPLER COMPLETE: CPT | Mod: 26,TXP,, | Performed by: INTERNAL MEDICINE

## 2023-11-09 PROCEDURE — 99999 PR PBB SHADOW E&M-EST. PATIENT-LVL IV: ICD-10-PCS | Mod: PBBFAC,TXP,, | Performed by: NURSE PRACTITIONER

## 2023-11-09 PROCEDURE — 93306 ECHO (CUPID ONLY): ICD-10-PCS | Mod: 26,TXP,, | Performed by: INTERNAL MEDICINE

## 2023-11-09 PROCEDURE — 76770 US RETROPERITONEAL COMPLETE: ICD-10-PCS | Mod: 26,TXP,, | Performed by: STUDENT IN AN ORGANIZED HEALTH CARE EDUCATION/TRAINING PROGRAM

## 2023-11-09 NOTE — PROGRESS NOTES
Kidney Transplant Recipient Reevalulation    Referring Physician: Keshav Dozier  Current Nephrologist: Keshav Dozier  Waitlist Status: active  Dialysis Start Date: (Not currently on dialysis)    Subjective:     CC:  Annual reassessment of kidney transplant candidacy.    HPI:  Ms. Leonard is a 57 y.o. 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  She has been on the wait list for a kidney transplant at [facility] since 3/21/2023. Patient is currently pre-dialysis. She has  no . Patient denies any recent hospitalizations or ED visits.     Latest Reference Range & Units 11/01/23 08:14   eGFR >60 mL/min/1.73 m^2 11.3 !        Fx assessment   Is active , still works, does resistance training at home. Keeps up with home, runs errands, looks great.       LOV 11/3/22  Lab /diagnostic results / txp wk u p reviewed    Being treated for UTI, s/s started in the end of September . She was initially tx with doxycycline. S/S returned once abx were completed.  11/2 had a urine cx  and started on Augmentin yesterday     11/9/23 Renal US: Bilateral renal simple cysts.   11/9/23 CXR: pending  12/19/22 MMG (-)  2/9/21 PAP (-)  11/7/2 Iliacs: Triphasic bilaterally   9/23/2019 colonoscopy:  Normal duodenum: recs: colonoscopy in 5 years     11/9/23 TTE       Left Ventricle: The left ventricle is normal in size. Normal wall thickness. Normal wall motion. There is normal systolic function with a visually estimated ejection fraction of 60 - 65%. There is normal diastolic function.    Right Ventricle: Normal right ventricular cavity size. Wall thickness is normal. Right ventricle wall motion  is normal. Systolic function is normal.    IVC/SVC: Normal venous pressure at 3 mmHg.       12/28/22 Stress test     Normal myocardial perfusion scan. There is no evidence of myocardial ischemia or infarction.    The gated perfusion images showed an ejection fraction of 65% at rest. The gated perfusion images showed an  ejection fraction of 67% post stress. Normal ejection fraction is greater than 47%.    There is normal wall motion at rest and post stress.    LV cavity size is normal at rest and normal at stress.    The ECG portion of the study is negative for ischemia.    The patient reported no chest pain during the stress test.    There were no arrhythmias during stress.    There are no prior studies for comparison.         Past Medical History:   Diagnosis Date    Abnormal Pap smear     Chronic kidney disease     - Stage 3    Depression     GERD (gastroesophageal reflux disease)     Mitral valve insufficiency     Mitral valve prolapse     Obesity     Sleep apnea     Thyroid disease        Review of Systems   Constitutional:  Positive for fatigue. Negative for activity change, appetite change, chills, fever and unexpected weight change.   HENT:  Negative for congestion, facial swelling, postnasal drip, rhinorrhea, sinus pressure, sore throat and trouble swallowing.    Eyes:  Positive for visual disturbance (wears glasses). Negative for pain and redness.   Respiratory:  Negative for cough, chest tightness, shortness of breath and wheezing.    Cardiovascular: Negative.  Negative for chest pain, palpitations and leg swelling.        Reported h/o mitral valve prolapse.   Gastrointestinal:  Positive for constipation (intermittent). Negative for abdominal pain, diarrhea, nausea and vomiting.   Genitourinary:  Negative for dysuria, flank pain and urgency.   Musculoskeletal:  Negative for gait problem, neck pain and neck stiffness.   Skin:  Negative for rash.   Allergic/Immunologic: Negative for environmental allergies, food allergies and immunocompromised state.   Neurological:  Negative for dizziness, weakness, light-headedness and headaches.   Psychiatric/Behavioral:  Negative for agitation and confusion. The patient is not nervous/anxious.        Objective: /73 (BP Location: Right arm, Patient Position: Sitting, BP Method:  "Medium (Automatic))   Pulse 67   Temp 97.3 °F (36.3 °C) (Temporal)   Resp 16   Ht 5' 2.6" (1.59 m)   Wt 86.9 kg (191 lb 9.3 oz)   SpO2 100%   BMI 34.37 kg/m²      body mass index is 34.37 kg/m².      Physical Exam  Vitals reviewed.   Constitutional:       Appearance: Normal appearance. She is well-developed.   HENT:      Head: Normocephalic.   Eyes:      Pupils: Pupils are equal, round, and reactive to light.   Cardiovascular:      Rate and Rhythm: Normal rate and regular rhythm.      Heart sounds: Normal heart sounds.   Pulmonary:      Effort: Pulmonary effort is normal.      Breath sounds: Normal breath sounds.   Abdominal:      General: Bowel sounds are normal.      Palpations: Abdomen is soft.   Musculoskeletal:         General: Normal range of motion.      Cervical back: Normal range of motion and neck supple.   Skin:     General: Skin is warm and dry.   Neurological:      Mental Status: She is alert and oriented to person, place, and time.      Motor: No abnormal muscle tone.   Psychiatric:         Behavior: Behavior normal.         Labs:  Lab Results   Component Value Date    WBC 4.29 08/02/2023    HGB 10.1 (L) 08/02/2023    HCT 33.0 (L) 08/02/2023     11/01/2023    K 4.5 11/01/2023     11/01/2023    CO2 20 (L) 11/01/2023    BUN 69 (H) 11/01/2023    CREATININE 4.3 (H) 11/01/2023    EGFRNORACEVR 11.3 (A) 11/01/2023    GLUCOSE 90 10/15/2021    CALCIUM 10.2 11/01/2023    PHOS 5.0 (H) 11/01/2023    ALBUMIN 3.3 (L) 11/01/2023    AST 16 11/03/2022    ALT 11 11/03/2022    UTPCR 3.02 (H) 03/30/2023    .8 (H) 08/02/2023       Lab Results   Component Value Date    BILIRUBINUA Negative 11/02/2023    PROTEINUA 1+ (A) 11/02/2023    NITRITE Negative 11/02/2023    RBCUA 1 11/02/2023    WBCUA 41 (H) 11/02/2023       No results found for: "HLAABCTYPE"    Lab Results   Component Value Date    CPRA 67 08/02/2023    ZM5QZXL A36,A26,A25 08/02/2023    CIABCLM A66 ---- WEAK Cw5, A43, A34 08/02/2023    " CIIAB DR7,DQ2,DR53 08/02/2023    ABCMT WEAK DRB3*03:01, DR9, DR12 08/02/2023       Labs were reviewed with the patient.    Pre-transplant Workup:   Reviewed with the patient.    Assessment:     1. Patient on waiting list for kidney transplant    2. Stage 5 chronic kidney disease not on chronic dialysis    3. FSGS (focal segmental glomerulosclerosis)    4. Secondary renal hyperparathyroidism    5. Class 2 severe obesity due to excess calories with serious comorbidity and body mass index (BMI) of 37.0 to 37.9 in adult        Plan:   MMG due 12/2023  Has a donor in wkup  Currently being tx for UTI  CXR results pending     Transplant Candidacy:   Ms. Leonard is a suitable kidney transplant candidate.  Meets center eligibility for accepting HCV+ donor offer - Yes.  Patient educated on HCV+ donors. [facility] is willing  to accept HCV+ donor offer -  Yes   Patient is a candidate for KDPI > 85 kidney donor offer - No d/t weight   She remains in overall stable health, and will remain active on the transplant list.    Patient advised that it is recommended that all transplant candidates, and their close contacts and household members receive Covid vaccination.    Francesca Ballesteros NP       Follow-up:   In addition to the tests noted in the plan, Ms. Leonard will continue to have reevaluation as per the standing pre-kidney transplant protocol:  Monthly blood for PRA  Annual return to clinic, except HIV positive, > 65 years of age, or pancreas transplant candidates who will be scheduled to see transplant every 6 months while in pre-transplant phase  Annual re-testing: CXR, EKG, yearly mammograms for women over 40 and PSA for males over 40, cardiology follow-up as recommended by initial cardiology pre-transplant evaluation  Renal ultrasound every 2 years  Baseline colonoscopy after age 50 and repeated as recommended    UNOS Patient Status  Functional Status: 80% - Normal activity with effort: some symptoms of disease  Physical  Capacity: No Limitations

## 2023-11-09 NOTE — TELEPHONE ENCOUNTER
MA notes per Pre Dialysis adherence form     FOR THE PAST THREE MONTHS:    0-Appt Adhrence  0-No show    No concerns with labs, care giving, transportation, or mental health    Per Dr. Dozier, no concerns with pt's compliance    Nyla Poon  Abdominal Transplant MA

## 2023-11-09 NOTE — LETTER
November 14, 2023        Keshav Dozier  [address] [address]  [address]  [facility] [address]  Phone: [phone]  Fax: [phone]             [facility]  [address]  [facility] [address]  Phone: [phone]   Patient: Jacqui Leonard   MR Number: [id number]   YOB: 1965   Date of Visit: 11/9/2023       Dear Dr. Keshav Dozier    Thank you for referring Jacqui Leonard to me for evaluation. Attached you will find relevant portions of my assessment and plan of care.    If you have questions, please do not hesitate to call me. I look forward to following Jacqui Leonard along with you.    Sincerely,    Francesca Ballesteros, NP    Enclosure    If you would like to receive this communication electronically, please contact [email or web address] or [phone] to request [employer] Link access.    [employer] Link is a tool which provides read-only access to select patient information with whom you have a relationship. Its easy to use and provides real time access to review your patients record including encounter summaries, notes, results, and demographic information.    If you feel you have received this communication in error or would no longer like to receive these types of communications, please e-mail [email or web address]

## 2023-11-09 NOTE — PROGRESS NOTES
LISTED PATIENT EDUCATION NOTE    Ms. Jacqui Leonard was seen in pre-kidney transplant clinic for evaluation for kidney, kidney/pancreas or pancreas only transplant.  The patient attended a an individual video education session that discussed/reviewed the following aspects of transplantation: evaluation including diagnostic and laboratory testing,( Chemistries, Hematology, Serologies including HIV and Hepatitis and HLA) required for transplantation and selection committee process, UNOS waitlist management/multiple listings, types of organs offered (KDPI < 85%, KDPI > 85%, PHS risk, DCD, HCV+, HIV+ for HIV+ recipients and enbloc/dual), financial aspects, surgical procedures, dietary instruction pre- and post-transplant, health maintenance pre- and post-transplant, post-transplant hospitalization and outpatient follow-up, potential to participate in a research protocol, and medication management and side effects.  A question and answer session was provided after the presentation.    The patient was seen by all members of the multi-disciplinary team to include: Nephrologist/GABBIE, Surgeon, , Transplant Coordinator, , Pharmacist and Dietician (if applicable).    The patient reviewed and signed all consents for evaluation which were witnessed and sent to scanning into the [facility] chart.    The patient was given an education book and plan for further evaluation based on her individual assessment.      Reviewed program requirement for complete COVID vaccination with documentation prior to listing.  COVID education information reviewed with patient. Patient encouraged to be up to date on all vaccinations.       The patient was informed that the transplant team would manage immediate post op pain. If the patient requires long term pain management, they will need to have that pain management addressed by their PCP or previous provider who wrote for long term pain medicines.    The patient was  encouraged to call with any questions or concerns.

## 2023-11-13 NOTE — PROGRESS NOTES
YEARLY LIST MANAGEMENT NOTE    Jacquitylor Leonard's kidney transplant listing status reviewed.  Patient is due for follow-up appointments on 11/9/24.  Appointments will be scheduled per protocol.

## 2023-11-14 DIAGNOSIS — Z12.31 VISIT FOR SCREENING MAMMOGRAM: Primary | ICD-10-CM

## 2023-11-14 DIAGNOSIS — N95.1 MENOPAUSAL STATE: Primary | ICD-10-CM

## 2023-11-14 DIAGNOSIS — Z13.828 ENCOUNTER FOR SCREENING FOR OTHER MUSCULOSKELETAL DISORDER: ICD-10-CM

## 2023-11-14 NOTE — PROGRESS NOTES
Transplant Psychosocial Evaluation Update:  Last psychosocial evaluation for transplant was completed on 11/3/2023 by SHAYLA Soto LCSW    Pt presents today in company of friend, Khushboo Regalado. Pt and caregiver present as alert, oriented to person, place, time, purpose of visit. Pt and caregiver deny concerns about going through with transplant and state motivation and sense of preparedness for transplantation, caregiver role, psychosocial risk factors, medical risk factors, financial aspects, and lifetime commitments. All concerns and education points as per transplant psychosocial evaluation process addressed (also refer to psychosocial dated 11/3/2023). Pt actively participated in today's interview, with friend, Khushboo Regalado participating as caregiver. Pt asking questions appropriately and denies changes to previous psychosocial evaluation for transplantation which we reviewed line by line with pt and, per pt choice, with pts caregiver today.    Primary Caregivers and Transportation for Transplant:  1. Khushboo Regalado ([phone]) 56 y/o friend; resides in [place], [place] and drives reliable vehicle.  2. Linda Messina ([phone]) 66 y/o neighbor/friend; resides in [place], [place] and drives reliable vehicle.  3. Zulay Lomax ([phone]) 61 y/o cousin; resides in [place], [place] and drives reliable vehicle.  4. Maura Barajas ([phone]) 58 y/o friend; resides in [place], [place] and drives reliable vehicle.    Both pt and caregiver/s plan to stay locally at home - information reviewed in depth. Caregivers plan to stay at hospital as appropriate for transplant and post-transplant process.    Pts Vocation: Pt works at [employer].    DME: No.     ADLS:  Pt reports independent with all ADLS, drives, and handles own medication management.      Household and Dependents: pt resides alone and has no dependents at this time.    Income: Pt states ability to afford all monthly expenses and costs  including for medications now and if transplanted based on income and on savings and assets.    Dialysis Adherence: Patient not on dialysis.    Pt and caregiver deny any additional concerns, stating preparedness and motivation to proceed with organ transplant.     Suitability for Transplant:   Pt remains suitable for transplant at this time based on lack of psychosocial risk factors and strengths.    Pt lianna well overall with health needs and life in general, has stable supports, adequate insurance, financial stability, transportation and caregiver plans in place, and is using no substances unless prescribed.

## 2023-12-05 ENCOUNTER — OFFICE VISIT (OUTPATIENT)
Dept: UROLOGY | Facility: CLINIC | Age: 58
End: 2023-12-05
Payer: COMMERCIAL

## 2023-12-05 VITALS
SYSTOLIC BLOOD PRESSURE: 146 MMHG | HEIGHT: 63 IN | BODY MASS INDEX: 34.75 KG/M2 | WEIGHT: 196.13 LBS | DIASTOLIC BLOOD PRESSURE: 81 MMHG | HEART RATE: 59 BPM

## 2023-12-05 DIAGNOSIS — R39.15 URGENCY OF URINATION: ICD-10-CM

## 2023-12-05 DIAGNOSIS — R30.0 DYSURIA: Primary | ICD-10-CM

## 2023-12-05 DIAGNOSIS — R35.0 FREQUENCY OF URINATION: ICD-10-CM

## 2023-12-05 LAB
BACTERIA #/AREA URNS AUTO: ABNORMAL /HPF
BILIRUB SERPL-MCNC: NORMAL MG/DL
BILIRUB UR QL STRIP: NEGATIVE
BLOOD URINE, POC: 50
CLARITY UR REFRACT.AUTO: CLEAR
CLARITY, POC UA: NORMAL
COLOR UR AUTO: COLORLESS
COLOR, POC UA: YELLOW
GLUCOSE UR QL STRIP: NEGATIVE
GLUCOSE UR QL STRIP: NORMAL
HGB UR QL STRIP: ABNORMAL
HYALINE CASTS UR QL AUTO: 0 /LPF
KETONES UR QL STRIP: NEGATIVE
KETONES UR QL STRIP: NORMAL
LEUKOCYTE ESTERASE UR QL STRIP: ABNORMAL
LEUKOCYTE ESTERASE URINE, POC: NORMAL
MICROSCOPIC COMMENT: ABNORMAL
NITRITE UR QL STRIP: NEGATIVE
NITRITE, POC UA: NORMAL
PH UR STRIP: 6 [PH] (ref 5–8)
PH, POC UA: 6
PROT UR QL STRIP: ABNORMAL
PROTEIN, POC: 100
RBC #/AREA URNS AUTO: 1 /HPF (ref 0–4)
SP GR UR STRIP: 1.01 (ref 1–1.03)
SPECIFIC GRAVITY, POC UA: 1.01
SQUAMOUS #/AREA URNS AUTO: 0 /HPF
URN SPEC COLLECT METH UR: ABNORMAL
UROBILINOGEN, POC UA: NORMAL
WBC #/AREA URNS AUTO: 10 /HPF (ref 0–5)

## 2023-12-05 PROCEDURE — 3066F PR DOCUMENTATION OF TREATMENT FOR NEPHROPATHY: ICD-10-PCS | Mod: CPTII,NTX,S$GLB,

## 2023-12-05 PROCEDURE — 3079F DIAST BP 80-89 MM HG: CPT | Mod: CPTII,NTX,S$GLB,

## 2023-12-05 PROCEDURE — 99204 PR OFFICE/OUTPT VISIT, NEW, LEVL IV, 45-59 MIN: ICD-10-PCS | Mod: 25,NTX,S$GLB,

## 2023-12-05 PROCEDURE — 3008F PR BODY MASS INDEX (BMI) DOCUMENTED: ICD-10-PCS | Mod: CPTII,NTX,S$GLB,

## 2023-12-05 PROCEDURE — 51701 INSERT BLADDER CATHETER: CPT | Mod: NTX,S$GLB,,

## 2023-12-05 PROCEDURE — 3008F BODY MASS INDEX DOCD: CPT | Mod: CPTII,NTX,S$GLB,

## 2023-12-05 PROCEDURE — 51701 PR INSERTION OF NON-INDWELLING BLADDER CATHETERIZATION FOR RESIDUAL UR: ICD-10-PCS | Mod: NTX,S$GLB,,

## 2023-12-05 PROCEDURE — 3077F SYST BP >= 140 MM HG: CPT | Mod: CPTII,NTX,S$GLB,

## 2023-12-05 PROCEDURE — 99999 PR PBB SHADOW E&M-EST. PATIENT-LVL III: ICD-10-PCS | Mod: PBBFAC,TXP,,

## 2023-12-05 PROCEDURE — 1160F PR REVIEW ALL MEDS BY PRESCRIBER/CLIN PHARMACIST DOCUMENTED: ICD-10-PCS | Mod: CPTII,NTX,S$GLB,

## 2023-12-05 PROCEDURE — 81002 URINALYSIS NONAUTO W/O SCOPE: CPT | Mod: NTX,S$GLB,,

## 2023-12-05 PROCEDURE — 99204 OFFICE O/P NEW MOD 45 MIN: CPT | Mod: 25,NTX,S$GLB,

## 2023-12-05 PROCEDURE — 1159F PR MEDICATION LIST DOCUMENTED IN MEDICAL RECORD: ICD-10-PCS | Mod: CPTII,NTX,S$GLB,

## 2023-12-05 PROCEDURE — 3079F PR MOST RECENT DIASTOLIC BLOOD PRESSURE 80-89 MM HG: ICD-10-PCS | Mod: CPTII,NTX,S$GLB,

## 2023-12-05 PROCEDURE — 87086 URINE CULTURE/COLONY COUNT: CPT | Mod: TXP

## 2023-12-05 PROCEDURE — 1159F MED LIST DOCD IN RCRD: CPT | Mod: CPTII,NTX,S$GLB,

## 2023-12-05 PROCEDURE — 81001 URINALYSIS AUTO W/SCOPE: CPT | Mod: TXP

## 2023-12-05 PROCEDURE — 3066F NEPHROPATHY DOC TX: CPT | Mod: CPTII,NTX,S$GLB,

## 2023-12-05 PROCEDURE — 81002 POCT URINE DIPSTICK WITHOUT MICROSCOPE: ICD-10-PCS | Mod: NTX,S$GLB,,

## 2023-12-05 PROCEDURE — 3077F PR MOST RECENT SYSTOLIC BLOOD PRESSURE >= 140 MM HG: ICD-10-PCS | Mod: CPTII,NTX,S$GLB,

## 2023-12-05 PROCEDURE — 1160F RVW MEDS BY RX/DR IN RCRD: CPT | Mod: CPTII,NTX,S$GLB,

## 2023-12-05 PROCEDURE — 99999 PR PBB SHADOW E&M-EST. PATIENT-LVL III: CPT | Mod: PBBFAC,TXP,,

## 2023-12-05 NOTE — PATIENT INSTRUCTIONS
UTI precautions:  Wipe front to back   Avoid constipation  Drink at least 1 liter of water daily  Void every 3 hrs  Expel urine from vagina post void  Void soon after urge arises  No dryer sheets or harsh detergents with the undergarments (no dyes or scents)  No bubble baths  Void before and after intercourse  Avoid hot tub use  Avoid tight fitting clothes and panty hose    D-Mannose 2 grams- helps to block bacteria from attaching to the bladder wall. I gram in the AM, 1 gram in the PM. [employer] or [employer].     Probiotic- [employer] Ultra 25 Billion CFU Probiotic Complex, Multi Strain. The Multi Strain is specifically the one that is important as the greater variety of strains is better.      Estrace apply a pea sized amount to urethra 3 x weekly at night   Estrogen depletion at any age has direct implications on genitourinary health and lower urinary tract function. Vaginal estrogen therapy is safe and extremely efficacious in treating these symptoms and lowering the risk of UTIs.Vaginal estrogen helps improve vaginal dryness, dyspareunia, and vaginal burning.    Avoid Bladder Irritants: Tea, coffee, caffeine, alcohol, artificial sweeteners, citrus, spicy foods, acidic foods,chocolate, tomato-based foods, smoking.

## 2023-12-05 NOTE — PROGRESS NOTES
CHIEF COMPLAINT:  Recurrent UTI      HISTORY OF PRESENTING ILLINESS:  Jacqui Leonard is a 57 y.o. female new to urology. Presents today due to recurrent UTI s/s that have been intermittent since September 2023. Started taking doxycyline on Saturday, had leftover pills. States that she was out of town in September and used a hot tub in her hotel room - that is when she first noticed  symptoms.    UTI association: hot tub use in a hotel in September 2023.   Bathe/ urinate before and after intercourse: not active   Complete bladder emptying: yes   Takes bubble baths: epsom salt/essential oils  Wipes from front to back: no  Constipated: yes, often. Eats an apple/day for constipation.  Daily water intake: 5 bottles/day  Daily probiotic: no  Cranberry supplements/D-Mannose: no  Baseline urinary symptoms: no  UTI symptoms: vaginal pressure, urgency/frequency, shooting pains in the pelvis  Hx of kidney stones:   Smoking history: quit 10 years ago  Etoh: none  Went to urologist when she was younger purple dye was used. Does not remember what procedure was performed.         REVIEW OF SYSTEMS:  Review of Systems   Constitutional:  Negative for chills and fever.   HENT:  Negative for congestion and sore throat.    Respiratory:  Negative for cough and shortness of breath.    Cardiovascular:  Negative for chest pain and palpitations.   Gastrointestinal:  Negative for nausea and vomiting.   Genitourinary:  Positive for frequency and urgency. Negative for dysuria, flank pain and hematuria.   Neurological:  Negative for dizziness and headaches.         PATIENT HISTORY:    Past Medical History:   Diagnosis Date    Abnormal Pap smear     Chronic kidney disease     - Stage 3    Depression     GERD (gastroesophageal reflux disease)     Mitral valve insufficiency     Mitral valve prolapse     Obesity     Sleep apnea     Thyroid disease        Past Surgical History:   Procedure Laterality Date    BUNIONECTOMY      CERVICAL BIOPSY   W/ LOOP ELECTRODE EXCISION      @  with all normal follow-ups    DILATION OF CERVIX N/A 10/22/2021    Procedure: DILATION, CERVIX;  Surgeon: Elvis Dutta MD;  Location: [facility] OR;  Service: OB/GYN;  Laterality: N/A;  evacuation of uterus, fluid    EXAMINATION UNDER ANESTHESIA N/A 10/22/2021    Procedure: Exam under anesthesia;  Surgeon: Elvis Dutta MD;  Location: [facility] OR;  Service: OB/GYN;  Laterality: N/A;    FOOT SURGERY      x2    TUBAL LIGATION         Family History   Problem Relation Age of Onset    Hypertension Mother     Heart attack Mother     No Known Problems Father     Bipolar disorder Daughter     Diabetes Maternal Uncle     Heart attack Maternal Uncle     Heart attack Maternal Grandfather     Breast cancer Neg Hx     Colon cancer Neg Hx     Ovarian cancer Neg Hx        Social History     Socioeconomic History    Marital status:    Tobacco Use    Smoking status: Former     Current packs/day: 0.00     Average packs/day: 0.5 packs/day for 20.0 years (10.0 ttl pk-yrs)     Types: Cigarettes     Start date: 1990     Quit date: 2010     Years since quittin.9    Smokeless tobacco: Never   Substance and Sexual Activity    Alcohol use: Not Currently     Alcohol/week: 3.0 - 4.0 standard drinks of alcohol     Types: 3 - 4 Shots of liquor per week     Comment: sober 1 year    Drug use: No    Sexual activity: Yes     Partners: Male     Birth control/protection: Post-menopausal       Allergies:  Patient has no known allergies.    Medications:    Current Outpatient Medications:     calcitRIOL (ROCALTROL) 0.5 MCG Cap, TAKE 1 CAPSULE (0.5 MCG TOTAL) BY MOUTH ONCE DAILY., Disp: 30 capsule, Rfl: 5    clotrimazole-betamethasone 1-0.05% (LOTRISONE) cream, Apply 1 g topically as needed., Disp: , Rfl:     rosuvastatin (CRESTOR) 20 MG tablet, Take 1 tablet (20 mg total) by mouth every evening., Disp: 90 tablet, Rfl: 3    amoxicillin-clavulanate 500-125mg (AUGMENTIN) 500-125 mg Tab, Take 1  tablet (500 mg total) by mouth once daily. (Patient not taking: Reported on 2023), Disp: 7 tablet, Rfl: 0    cefUROXime (CEFTIN) 250 MG tablet, Take 1 tablet (250 mg total) by mouth every 12 (twelve) hours. (Patient not taking: Reported on 2023), Disp: 14 tablet, Rfl: 0    PHYSICAL EXAMINATION:  Physical Exam  Exam conducted with a chaperone present.   Constitutional:       Appearance: Normal appearance.   HENT:      Head: Normocephalic and atraumatic.      Right Ear: External ear normal.      Left Ear: External ear normal.      Nose: Nose normal.      Mouth/Throat:      Mouth: Mucous membranes are moist.   Pulmonary:      Effort: Pulmonary effort is normal. No respiratory distress.   Abdominal:      Hernia: There is no hernia in the left inguinal area or right inguinal area.   Genitourinary:     Exam position: Lithotomy position.      Pubic Area: No rash or pubic lice.       Labia:         Right: No rash, tenderness, lesion or injury.         Left: No rash, tenderness, lesion or injury.       Urethra: No prolapse, urethral pain, urethral swelling or urethral lesion.      Comments: Consent verbally obtained. Rationale provided. Name, , allergies verified. Betadine prep was applied to the urethral meatus. An in and out cath was performed after voiding.  The PVR was 30 ml.     Lymphadenopathy:      Lower Body: No right inguinal adenopathy. No left inguinal adenopathy.   Skin:     General: Skin is warm and dry.   Neurological:      General: No focal deficit present.      Mental Status: She is alert and oriented to person, place, and time.   Psychiatric:         Mood and Affect: Mood normal.         Behavior: Behavior normal.         Lab Results   Component Value Date    CREATININE 4.2 (H) 2023    EGFRNORACEVR 11.7 (A) 2023         IMPRESSION:  Encounter Diagnoses   Name Primary?    Dysuria Yes    Urgency of urination     Frequency of urination          Assessment:       1. Dysuria    2. Urgency  of urination    3. Frequency of urination        Plan:   - UTI precautions and recommendations provided.   - Urine sent for UA and culture.  - Due to being on doxycyline already, encouraged to complete course.    RTC 2 months or sooner PRN    I spent 45 minutes with the patient of which more than half was spent in direct consultation with the patient in regards to our treatment and plan.  We addressed the office findings and recent labs.   Education and recommendations of today's plan of care including home remedies and needed follow up with PCP.

## 2023-12-06 ENCOUNTER — LAB VISIT (OUTPATIENT)
Dept: LAB | Facility: HOSPITAL | Age: 58
End: 2023-12-06
Payer: COMMERCIAL

## 2023-12-06 DIAGNOSIS — Z76.82 PRE-KIDNEY TRANSPLANT, LISTED: ICD-10-CM

## 2023-12-06 DIAGNOSIS — N18.5 CKD (CHRONIC KIDNEY DISEASE) STAGE 5, GFR LESS THAN 15 ML/MIN: ICD-10-CM

## 2023-12-06 LAB
ALBUMIN SERPL BCP-MCNC: 3.3 G/DL (ref 3.5–5.2)
ANION GAP SERPL CALC-SCNC: 9 MMOL/L (ref 8–16)
BACTERIA UR CULT: NO GROWTH
BUN SERPL-MCNC: 71 MG/DL (ref 6–20)
CALCIUM SERPL-MCNC: 10.7 MG/DL (ref 8.7–10.5)
CHLORIDE SERPL-SCNC: 108 MMOL/L (ref 95–110)
CO2 SERPL-SCNC: 23 MMOL/L (ref 23–29)
CREAT SERPL-MCNC: 4.2 MG/DL (ref 0.5–1.4)
EST. GFR  (NO RACE VARIABLE): 11.7 ML/MIN/1.73 M^2
GLUCOSE SERPL-MCNC: 85 MG/DL (ref 70–110)
PHOSPHATE SERPL-MCNC: 5.7 MG/DL (ref 2.7–4.5)
POTASSIUM SERPL-SCNC: 4 MMOL/L (ref 3.5–5.1)
SODIUM SERPL-SCNC: 140 MMOL/L (ref 136–145)

## 2023-12-06 PROCEDURE — 36415 COLL VENOUS BLD VENIPUNCTURE: CPT | Mod: PO,TXP | Performed by: INTERNAL MEDICINE

## 2023-12-06 PROCEDURE — 80069 RENAL FUNCTION PANEL: CPT | Mod: TXP | Performed by: INTERNAL MEDICINE

## 2023-12-07 ENCOUNTER — TELEPHONE (OUTPATIENT)
Dept: NEPHROLOGY | Facility: CLINIC | Age: 58
End: 2023-12-07
Payer: COMMERCIAL

## 2023-12-07 ENCOUNTER — PATIENT MESSAGE (OUTPATIENT)
Dept: UROLOGY | Facility: CLINIC | Age: 58
End: 2023-12-07
Payer: COMMERCIAL

## 2023-12-07 NOTE — TELEPHONE ENCOUNTER
----- Message from Keshav Dozier MD sent at 12/7/2023  8:09 AM CST -----  Hello.  Do you have any new symptoms?

## 2023-12-07 NOTE — TELEPHONE ENCOUNTER
No new symptoms.Patient is bruising easily and would like to know if it is ok to take melatonin. I am sending this message to .

## 2023-12-12 DIAGNOSIS — Z13.828 ENCOUNTER FOR SPECIAL SCREENING EXAMINATION FOR MUSCULOSKELETAL DISORDER: ICD-10-CM

## 2023-12-12 DIAGNOSIS — Z12.31 VISIT FOR SCREENING MAMMOGRAM: Primary | ICD-10-CM

## 2023-12-12 LAB
CLASS I ANTIBODIES - LUMINEX: NORMAL
CLASS I ANTIBODY COMMENTS - LUMINEX: NORMAL
CLASS II ANTIBODIES - LUMINEX: NORMAL
CLASS II ANTIBODY COMMENTS - LUMINEX: NORMAL
CPRA %: 66
SERUM COLLECTION DT - LUMINEX CLASS I: NORMAL
SERUM COLLECTION DT - LUMINEX CLASS II: NORMAL
SPCL1 TESTING DATE: NORMAL
SPCL2 TESTING DATE: NORMAL
SPCLU TESTING DATE: NORMAL

## 2023-12-18 RX ORDER — CALCITRIOL 0.5 UG/1
0.5 CAPSULE ORAL DAILY
Qty: 30 CAPSULE | Refills: 5 | Status: SHIPPED | OUTPATIENT
Start: 2023-12-18

## 2024-01-03 ENCOUNTER — LAB VISIT (OUTPATIENT)
Dept: LAB | Facility: HOSPITAL | Age: 59
End: 2024-01-03
Payer: COMMERCIAL

## 2024-01-03 DIAGNOSIS — Z76.82 PRE-KIDNEY TRANSPLANT, LISTED: ICD-10-CM

## 2024-01-10 ENCOUNTER — HOSPITAL ENCOUNTER (OUTPATIENT)
Dept: RADIOLOGY | Facility: HOSPITAL | Age: 59
Discharge: HOME | End: 2024-01-10
Attending: STUDENT IN AN ORGANIZED HEALTH CARE EDUCATION/TRAINING PROGRAM
Payer: COMMERCIAL

## 2024-01-10 ENCOUNTER — OFFICE VISIT (OUTPATIENT)
Dept: UROLOGY | Facility: CLINIC | Age: 59
End: 2024-01-10
Payer: COMMERCIAL

## 2024-01-10 VITALS — WEIGHT: 197.06 LBS | BODY MASS INDEX: 35.36 KG/M2

## 2024-01-10 DIAGNOSIS — R35.0 FREQUENCY OF URINATION: ICD-10-CM

## 2024-01-10 DIAGNOSIS — Z76.89 ESTABLISHING CARE WITH NEW DOCTOR, ENCOUNTER FOR: ICD-10-CM

## 2024-01-10 DIAGNOSIS — R10.9 ABDOMINAL PAIN, UNSPECIFIED ABDOMINAL LOCATION: ICD-10-CM

## 2024-01-10 DIAGNOSIS — N20.0 NEPHROLITHIASIS: ICD-10-CM

## 2024-01-10 DIAGNOSIS — R30.0 DYSURIA: ICD-10-CM

## 2024-01-10 DIAGNOSIS — R10.9 ABDOMINAL PAIN, UNSPECIFIED ABDOMINAL LOCATION: Primary | ICD-10-CM

## 2024-01-10 DIAGNOSIS — R39.15 URGENCY OF URINATION: ICD-10-CM

## 2024-01-10 PROCEDURE — 99999 PR PBB SHADOW E&M-EST. PATIENT-LVL III: CPT | Mod: PBBFAC,TXP,, | Performed by: STUDENT IN AN ORGANIZED HEALTH CARE EDUCATION/TRAINING PROGRAM

## 2024-01-10 PROCEDURE — 87088 URINE BACTERIA CULTURE: CPT | Mod: NTX | Performed by: STUDENT IN AN ORGANIZED HEALTH CARE EDUCATION/TRAINING PROGRAM

## 2024-01-10 PROCEDURE — 87186 SC STD MICRODIL/AGAR DIL: CPT | Mod: NTX | Performed by: STUDENT IN AN ORGANIZED HEALTH CARE EDUCATION/TRAINING PROGRAM

## 2024-01-10 PROCEDURE — 1160F RVW MEDS BY RX/DR IN RCRD: CPT | Mod: CPTII,NTX,S$GLB, | Performed by: STUDENT IN AN ORGANIZED HEALTH CARE EDUCATION/TRAINING PROGRAM

## 2024-01-10 PROCEDURE — 87077 CULTURE AEROBIC IDENTIFY: CPT | Mod: TXP | Performed by: STUDENT IN AN ORGANIZED HEALTH CARE EDUCATION/TRAINING PROGRAM

## 2024-01-10 PROCEDURE — 1159F MED LIST DOCD IN RCRD: CPT | Mod: CPTII,NTX,S$GLB, | Performed by: STUDENT IN AN ORGANIZED HEALTH CARE EDUCATION/TRAINING PROGRAM

## 2024-01-10 PROCEDURE — 3008F BODY MASS INDEX DOCD: CPT | Mod: CPTII,NTX,S$GLB, | Performed by: STUDENT IN AN ORGANIZED HEALTH CARE EDUCATION/TRAINING PROGRAM

## 2024-01-10 PROCEDURE — 87086 URINE CULTURE/COLONY COUNT: CPT | Mod: NTX | Performed by: STUDENT IN AN ORGANIZED HEALTH CARE EDUCATION/TRAINING PROGRAM

## 2024-01-10 PROCEDURE — 74176 CT ABD & PELVIS W/O CONTRAST: CPT | Mod: TC,NTX

## 2024-01-10 PROCEDURE — 99214 OFFICE O/P EST MOD 30 MIN: CPT | Mod: NTX,S$GLB,, | Performed by: STUDENT IN AN ORGANIZED HEALTH CARE EDUCATION/TRAINING PROGRAM

## 2024-01-10 PROCEDURE — 74176 CT ABD & PELVIS W/O CONTRAST: CPT | Mod: 26,NTX,, | Performed by: RADIOLOGY

## 2024-01-10 NOTE — PATIENT INSTRUCTIONS
Avoid known bladder irritants:   Coffee - Regular & Decaf  Tea - caffeinated  Carbonated beverages - cola, non-michelle, diet & caffeine-free  Alcohols - Beer, Red Wine, White Wine, Champagne  Fruits - Grapefruit, Lemon, Orange, Pineapple  Fruit Juices - Cranberry, Grapefruit, Orange, Pineapple  Vegetables - Tomato & Tomato Products  Flavor Enhancers - Hot peppers, Spicy foods, Chili, Horseradish, Vinegar, Monosodium glutamate (MSG)  Artificial Sweeteners - NutraSweet, Sweet N Low, Equal (sweetener), Saccharin

## 2024-01-10 NOTE — PROGRESS NOTES
Patient ID: Jacqui Leonard is a 58 y.o. female.    Chief Complaint: UTI symptoms    HPI  58 y.o.   The patient presents with a chief complaint of frequent urination and pain towards the end of urination. The patient reports a history of urinary tract infections, with the most recent episode occurring in September. They were prescribed antibiotics, which initially resolved the symptoms, but the infection recurred. Urine culture confirmed presence of infection but symptoms lingered after treatment. The patient's nephrologist recommended seeing a urologist for further evaluation.    The patient is currently experiencing frequent urination, pain during urination, and difficulty urinating. They suspect they may have passed a small stone, as they had a small stone identified on an ultrasound in November. The patient is cognizant of their water intake, consuming at least four to five gallons per month, and wakes up in the middle of the night to urinate several times. They report having bowel movements at least once a day, sometimes skipping a day, and occasionally use artificial sweeteners.    The patient has a history of stage five kidney disease and has been under the care of a nephrologist, who recommended a fluid intake of at least one liter per day. The patient denies any swelling in the ankles. They have not been on antibiotics since the 22nd and had a negative urine culture recently. The patient visited the emergency room for their symptoms but left without being seen after a long wait.    In the past, the patient saw a urologist as a young girl but does not recall the reason. They had one child and experienced a kidney infection during pregnancy, which did not require antibiotics for the entire pregnancy.    Medically Necessary ROS documented in HPI    Past Medical History  Active Ambulatory Problems     Diagnosis Date Noted    Persistent proteinuria 10/09/2020    BMI 36.0-36.9,adult 02/09/2021    Acquired  cyst of kidney 06/11/2021    Mass of uterine cervix 10/20/2021    Hematocolpos 10/22/2021    FSGS (focal segmental glomerulosclerosis) 08/05/2022    Secondary renal hyperparathyroidism 08/05/2022    Stage 5 chronic kidney disease not on chronic dialysis 08/05/2022    Mitral valve prolapse 11/03/2022    Class 2 severe obesity due to excess calories with serious comorbidity and body mass index (BMI) of 37.0 to 37.9 in adult 12/28/2022    Patient on waiting list for kidney transplant 12/28/2022     Resolved Ambulatory Problems     Diagnosis Date Noted    CKD (chronic kidney disease) stage 3, GFR 30-59 ml/min 12/20/2016    Pre-op testing 10/22/2021    COVID 06/30/2022    Preoperative clearance 12/28/2022     Past Medical History:   Diagnosis Date    Abnormal Pap smear     Chronic kidney disease     Depression     GERD (gastroesophageal reflux disease)     Mitral valve insufficiency     Obesity     Sleep apnea     Thyroid disease          Past Surgical History  Past Surgical History:   Procedure Laterality Date    BUNIONECTOMY      CERVICAL BIOPSY  W/ LOOP ELECTRODE EXCISION      @ 2012 with all normal follow-ups    DILATION OF CERVIX N/A 10/22/2021    Procedure: DILATION, CERVIX;  Surgeon: Elvis Dutta MD;  Location: [facility];  Service: OB/GYN;  Laterality: N/A;  evacuation of uterus, fluid    EXAMINATION UNDER ANESTHESIA N/A 10/22/2021    Procedure: Exam under anesthesia;  Surgeon: Elvis Dutta MD;  Location: [facility];  Service: OB/GYN;  Laterality: N/A;    FOOT SURGERY      x2    TUBAL LIGATION         Social History  Social Connections: Not on file       Medications    Current Outpatient Medications:     calcitRIOL (ROCALTROL) 0.5 MCG Cap, TAKE 1 CAPSULE (0.5 MCG TOTAL) BY MOUTH ONCE DAILY., Disp: 30 capsule, Rfl: 5    clotrimazole-betamethasone 1-0.05% (LOTRISONE) cream, Apply 1 g topically as needed., Disp: , Rfl:     Lactobacillus rhamnosus GG (CULTURELLE) 10 billion cell capsule, Take 1 capsule by mouth once  daily., Disp: , Rfl:     rosuvastatin (CRESTOR) 20 MG tablet, Take 1 tablet (20 mg total) by mouth every evening., Disp: 90 tablet, Rfl: 3    amoxicillin-clavulanate 500-125mg (AUGMENTIN) 500-125 mg Tab, Take 1 tablet (500 mg total) by mouth once daily. (Patient not taking: Reported on 12/5/2023), Disp: 7 tablet, Rfl: 0    cefUROXime (CEFTIN) 250 MG tablet, Take 1 tablet (250 mg total) by mouth every 12 (twelve) hours. (Patient not taking: Reported on 11/9/2023), Disp: 14 tablet, Rfl: 0    Allergies  Review of patient's allergies indicates:  No Known Allergies    Patient's PMH, FH, Social hx, Medications, allergies reviewed and updated as pertinent to today's visit    Objective:      Physical Exam  Constitutional:       Appearance: She is well-developed.   HENT:      Head: Normocephalic and atraumatic.   Eyes:      Conjunctiva/sclera: Conjunctivae normal.   Pulmonary:      Effort: Pulmonary effort is normal. No respiratory distress.   Abdominal:      General: Abdomen is flat. There is no distension.      Palpations: Abdomen is soft. There is no mass.      Tenderness: There is no abdominal tenderness. There is no right CVA tenderness, left CVA tenderness or guarding.   Musculoskeletal:      Right lower leg: No edema.      Left lower leg: No edema.   Skin:     General: Skin is warm.      Findings: No rash.   Neurological:      Mental Status: She is alert and oriented to person, place, and time.   Psychiatric:         Behavior: Behavior normal.           Assessment:       1. Abdominal pain, unspecified abdominal location    2. Dysuria    3. Frequency of urination    4. Urgency of urination    5. Nephrolithiasis    6. Establishing care with new doctor, encounter for        Plan:       Abdominal pain, hx of stones  CT renal ordered    Urine culture, will send abx and tailor according to sensitivities    Discussed voiding diary to measure how much fluid patient is consuming.       Discussed symptoms may be related to OAB,  once CT results can tailor recs, consider decreasing fluid intake to minimize urinary frequency      Patient is new to me

## 2024-01-12 ENCOUNTER — TELEPHONE (OUTPATIENT)
Dept: UROLOGY | Facility: CLINIC | Age: 59
End: 2024-01-12
Payer: COMMERCIAL

## 2024-01-12 DIAGNOSIS — N30.90 CYSTITIS: Primary | ICD-10-CM

## 2024-01-12 LAB — BACTERIA UR CULT: ABNORMAL

## 2024-01-12 RX ORDER — CEFDINIR 300 MG/1
300 CAPSULE ORAL DAILY
Qty: 5 CAPSULE | Refills: 0 | Status: SHIPPED | OUTPATIENT
Start: 2024-01-12 | End: 2024-01-17

## 2024-01-12 NOTE — TELEPHONE ENCOUNTER
----- Message from Magdalena Tabares MD sent at 1/12/2024  9:01 AM CST -----  Pls notify pt of abx sent for UTI

## 2024-01-17 ENCOUNTER — PATIENT MESSAGE (OUTPATIENT)
Dept: OBSTETRICS AND GYNECOLOGY | Facility: CLINIC | Age: 59
End: 2024-01-17
Payer: COMMERCIAL

## 2024-01-17 RX ORDER — HYOSCYAMINE SULFATE 0.125 MG
125 TABLET ORAL EVERY 4 HOURS PRN
Qty: 30 TABLET | Refills: 0 | Status: SHIPPED | OUTPATIENT
Start: 2024-01-17 | End: 2024-02-09

## 2024-01-24 ENCOUNTER — NURSE TRIAGE (OUTPATIENT)
Dept: ADMINISTRATIVE | Facility: CLINIC | Age: 59
End: 2024-01-24
Payer: COMMERCIAL

## 2024-01-24 ENCOUNTER — HOSPITAL ENCOUNTER (EMERGENCY)
Facility: HOSPITAL | Age: 59
Discharge: HOME | End: 2024-01-24
Attending: STUDENT IN AN ORGANIZED HEALTH CARE EDUCATION/TRAINING PROGRAM
Payer: COMMERCIAL

## 2024-01-24 VITALS
HEART RATE: 66 BPM | SYSTOLIC BLOOD PRESSURE: 180 MMHG | HEIGHT: 63 IN | DIASTOLIC BLOOD PRESSURE: 78 MMHG | WEIGHT: 192 LBS | RESPIRATION RATE: 18 BRPM | BODY MASS INDEX: 34.02 KG/M2 | OXYGEN SATURATION: 99 % | TEMPERATURE: 98 F

## 2024-01-24 DIAGNOSIS — N30.00 ACUTE CYSTITIS WITHOUT HEMATURIA: Primary | ICD-10-CM

## 2024-01-24 LAB
BACTERIA #/AREA URNS AUTO: ABNORMAL /HPF
BACTERIA GENITAL QL WET PREP: ABNORMAL
BILIRUB UR QL STRIP: NEGATIVE
CLARITY UR REFRACT.AUTO: ABNORMAL
CLUE CELLS VAG QL WET PREP: ABNORMAL
COLOR UR AUTO: YELLOW
FILAMENT FUNGI VAG WET PREP-#/AREA: ABNORMAL
GLUCOSE UR QL STRIP: NEGATIVE
HGB UR QL STRIP: ABNORMAL
HYALINE CASTS UR QL AUTO: 0 /LPF
KETONES UR QL STRIP: NEGATIVE
LEUKOCYTE ESTERASE UR QL STRIP: ABNORMAL
MICROSCOPIC COMMENT: ABNORMAL
NITRITE UR QL STRIP: NEGATIVE
PH UR STRIP: 6 [PH] (ref 5–8)
PROT UR QL STRIP: ABNORMAL
RBC #/AREA URNS AUTO: 6 /HPF (ref 0–4)
SP GR UR STRIP: 1.01 (ref 1–1.03)
SPECIMEN SOURCE: ABNORMAL
T VAGINALIS GENITAL QL WET PREP: ABNORMAL
URN SPEC COLLECT METH UR: ABNORMAL
WBC #/AREA URNS AUTO: >100 /HPF (ref 0–5)
WBC #/AREA VAG WET PREP: ABNORMAL
WBC CLUMPS UR QL AUTO: ABNORMAL
YEAST GENITAL QL WET PREP: ABNORMAL

## 2024-01-24 PROCEDURE — 25000003 PHARM REV CODE 250: Mod: NTX | Performed by: STUDENT IN AN ORGANIZED HEALTH CARE EDUCATION/TRAINING PROGRAM

## 2024-01-24 PROCEDURE — 87186 SC STD MICRODIL/AGAR DIL: CPT | Mod: NTX | Performed by: STUDENT IN AN ORGANIZED HEALTH CARE EDUCATION/TRAINING PROGRAM

## 2024-01-24 PROCEDURE — 87088 URINE BACTERIA CULTURE: CPT | Mod: NTX | Performed by: STUDENT IN AN ORGANIZED HEALTH CARE EDUCATION/TRAINING PROGRAM

## 2024-01-24 PROCEDURE — 87077 CULTURE AEROBIC IDENTIFY: CPT | Mod: NTX | Performed by: STUDENT IN AN ORGANIZED HEALTH CARE EDUCATION/TRAINING PROGRAM

## 2024-01-24 PROCEDURE — 87210 SMEAR WET MOUNT SALINE/INK: CPT | Mod: NTX | Performed by: STUDENT IN AN ORGANIZED HEALTH CARE EDUCATION/TRAINING PROGRAM

## 2024-01-24 PROCEDURE — 99284 EMERGENCY DEPT VISIT MOD MDM: CPT | Mod: NTX

## 2024-01-24 PROCEDURE — 87086 URINE CULTURE/COLONY COUNT: CPT | Mod: NTX | Performed by: STUDENT IN AN ORGANIZED HEALTH CARE EDUCATION/TRAINING PROGRAM

## 2024-01-24 PROCEDURE — 81001 URINALYSIS AUTO W/SCOPE: CPT | Mod: NTX | Performed by: STUDENT IN AN ORGANIZED HEALTH CARE EDUCATION/TRAINING PROGRAM

## 2024-01-24 RX ORDER — CEFUROXIME AXETIL 250 MG/1
500 TABLET ORAL
Status: DISCONTINUED | OUTPATIENT
Start: 2024-01-24 | End: 2024-01-24

## 2024-01-24 RX ORDER — CEFPODOXIME PROXETIL 100 MG/1
100 TABLET, FILM COATED ORAL EVERY 12 HOURS
Qty: 14 TABLET | Refills: 0 | Status: SHIPPED | OUTPATIENT
Start: 2024-01-24 | End: 2024-01-31

## 2024-01-24 RX ORDER — CEFUROXIME AXETIL 500 MG/1
500 TABLET ORAL EVERY 12 HOURS
Qty: 14 TABLET | Refills: 0 | Status: SHIPPED | OUTPATIENT
Start: 2024-01-24 | End: 2024-01-24

## 2024-01-24 RX ORDER — CEFPODOXIME PROXETIL 200 MG/1
200 TABLET, FILM COATED ORAL
Status: COMPLETED | OUTPATIENT
Start: 2024-01-24 | End: 2024-01-24

## 2024-01-24 RX ADMIN — CEFPODOXIME PROXETIL 200 MG: 200 TABLET, FILM COATED ORAL at 07:01

## 2024-01-24 NOTE — TELEPHONE ENCOUNTER
Pt has been having on and off UTI since September. Today, pt having pains that feel like they are inside of her vagina. Had to leave work today. Also had pain last night. Pain comes and goes but becoming more frequent. Rates pain 8/10 at this time. Pt feels like she has to urinate a lot to get any sort of relief, so she is going more frequently. Also if she allows her bladder to fill up too much it hurts worse. No burning with urination, no pain passing urine, no blood in urine. States pain has been severe and present for >1 hour at this time.    Dispo- go to ED now. Pt verbalizes understanding and intent to follow advisement. Will got to ED now for further eval.  Reason for Disposition   SEVERE pelvic pain and present > 1 hour    Additional Information   Negative: Shock suspected (e.g., cold/pale/clammy skin, too weak to stand, low BP, rapid pulse)   Negative: Passed out (i.e., lost consciousness, collapsed and was not responding)   Negative: Sounds like a life-threatening emergency to the triager   Negative: SEVERE pelvic pain (e.g., excruciating) and vomiting    Protocols used: Pelvic Pain - Female-A-[place]

## 2024-01-24 NOTE — Clinical Note
"Jacqui Armendarizelle" Horacio was seen and treated in our emergency department on 1/24/2024.  She may return to work on 01/26/2024.       If you have any questions or concerns, please don't hesitate to call.      Paul Haynes, DO"

## 2024-01-25 NOTE — ED NOTES
Patient identifiers verified and correct for MS Horacio   C/C:  Possible UTI AND pain to vaginal area SEE NN  APPEARANCE: awake and alert in NAD. PAIN  5/10  SKIN: warm, dry and intact. No breakdown or bruising.  MUSCULOSKELETAL: Patient moving all extremities spontaneously, no obvious swelling or deformities noted. Ambulates independently.  RESPIRATORY: Denies shortness of breath.Respirations unlabored.   CARDIAC: Denies CP, 2+ distal pulses; no peripheral edema  ABDOMEN: S/ND/NT, Denies nausea  : voids spontaneously, denies burning, reports pain   Neurologic: AAO x 4; follows commands equal strength in all extremities; denies numbness/tingling. Denies dizziness Denies new weakness

## 2024-01-25 NOTE — DISCHARGE INSTRUCTIONS
You were evaluated in the emergency department today for urinary tract infection.  Although there were no findings of concern to necessitate admission to the hospital or warrant immediate surgical intervention, disease exists on a spectrum and your disease process may progress.  If this is the case, please watch your symptoms and return to the emergency department if you feel worse and are unable to discuss care with your primary care doctor in follow up in the next several days.  Specific information regarding your complaint has been provided.  Thank you for choosing [facility]!    You have been given antibiotics for a urinary tract infection.  Take all the antibiotics as prescribed.  You should follow up with your primary care physician or your gynecologist if you are a female for further evaluation and have your urine test repeated.  Return to the ER if you have a high fever, persistent vomiting, back pain, or any other concerning symptoms.     Given your persistent symptoms and recurrent UTIs, I think it is reasonable to readdress vaginal estrogen with your gynecologist and/or your urologist.  You can discuss the risks and benefits at that point, and go over more definitive management for your future.

## 2024-01-25 NOTE — ED PROVIDER NOTES
Source of History  patient and EMR    Chief Complaint    Vaginal Pain (Since December. Has been treated for multiple UTI in the last few months. Denies vaginal bleeding, discharge, or urinary complaints. )      History of Present Illness    Jacqui Leonard is a 58 y.o. female presenting with dysuria, and burning at the vagina.  Has been off and on since December.  Has been on 2 antibiotics.  Saw Urology in the clinic mid January over a week ago, but was given hyoscyamine, was not given antibiotic.  The patient reports no fevers, nausea, vomiting.  She reports incomplete voiding.  She is postmenopausal.  She still has her uterus.  She had previously been on vaginal estrogen but was concerned because she ended up having a pelvic mass, and it seems like she was told to stop it.    Review of Systems    As per HPI and below:  Constitutional symptoms:  No fever or chills   Skin symptoms:  No rash    Respiratory symptoms:  No shortness of breath  Cardiovascular symptoms:  No chest pain   Gastrointestinal symptoms:  + abdominal pain particularly in the pelvic region, no nausea, no vomiting  Genitourinary symptoms:  + dysuria, no hematuria, no vaginal bleeding, no vaginal discharge  Musculoskeletal symptoms:  No back pain, No joint pains or aches      Past History    As per HPI and below:  Past Medical History:   Diagnosis Date    Abnormal Pap smear     Chronic kidney disease     - Stage 3    Depression     GERD (gastroesophageal reflux disease)     Mitral valve insufficiency     Mitral valve prolapse     Obesity     Sleep apnea     Thyroid disease        Past Surgical History:   Procedure Laterality Date    BUNIONECTOMY      CERVICAL BIOPSY  W/ LOOP ELECTRODE EXCISION      @ 2012 with all normal follow-ups    DILATION OF CERVIX N/A 10/22/2021    Procedure: DILATION, CERVIX;  Surgeon: Elvis Dutta MD;  Location: [facility];  Service: OB/GYN;  Laterality: N/A;  evacuation of uterus, fluid    EXAMINATION UNDER ANESTHESIA  N/A 10/22/2021    Procedure: Exam under anesthesia;  Surgeon: Elvis Dutta MD;  Location: [facility];  Service: OB/GYN;  Laterality: N/A;    FOOT SURGERY      x2    TUBAL LIGATION         Social History     Socioeconomic History    Marital status:    Tobacco Use    Smoking status: Former     Current packs/day: 0.00     Average packs/day: 0.5 packs/day for 20.0 years (10.0 ttl pk-yrs)     Types: Cigarettes     Start date: 1990     Quit date: 2010     Years since quittin.0    Smokeless tobacco: Never   Substance and Sexual Activity    Alcohol use: Not Currently     Alcohol/week: 3.0 - 4.0 standard drinks of alcohol     Types: 3 - 4 Shots of liquor per week     Comment: sober 1 year    Drug use: No    Sexual activity: Yes     Partners: Male     Birth control/protection: Post-menopausal       Family History   Problem Relation Age of Onset    Hypertension Mother     Heart attack Mother     No Known Problems Father     Bipolar disorder Daughter     Diabetes Maternal Uncle     Heart attack Maternal Uncle     Heart attack Maternal Grandfather     Breast cancer Neg Hx     Colon cancer Neg Hx     Ovarian cancer Neg Hx        Review of patient's allergies indicates:  No Known Allergies    No current facility-administered medications on file prior to encounter.     Current Outpatient Medications on File Prior to Encounter   Medication Sig Dispense Refill    calcitRIOL (ROCALTROL) 0.5 MCG Cap TAKE 1 CAPSULE (0.5 MCG TOTAL) BY MOUTH ONCE DAILY. 30 capsule 5    clotrimazole-betamethasone 1-0.05% (LOTRISONE) cream Apply 1 g topically as needed.      hyoscyamine (ANASPAZ,LEVSIN) 0.125 mg Tab Take 1 tablet (125 mcg total) by mouth every 4 (four) hours as needed. 30 tablet 0    Lactobacillus rhamnosus GG (CULTURELLE) 10 billion cell capsule Take 1 capsule by mouth once daily.      rosuvastatin (CRESTOR) 20 MG tablet Take 1 tablet (20 mg total) by mouth every evening. 90 tablet 3       Physical Exam    Reviewed  "nursing notes.  Vitals:    01/24/24 1710   BP: (!) 180/78   Pulse: 66   Resp: 18   Temp: 98.1 °F (36.7 °C)   TempSrc: Oral   SpO2: 99%   Weight: 87.1 kg (192 lb)   Height: 5' 3" (1.6 m)     General:  Alert, no acute distress.    Skin:  Warm, dry, intact.  No rash.  Head:  Normocephalic, atraumatic.    Neck:  Supple.   Eye:  Normal conjunctiva.   Ears, nose, mouth and throat:  Normal phonation.  Cardiovascular:  Regular rate and rhythm, Normal peripheral perfusion, No edema.    Respiratory: respirations are non-labored.  Gastrointestinal:   Soft, non tender, Non distended.   :  External genitalia normal and without lesions.  No active bleeding at introitus.    Speculum:  Slight white discharge observed.  Normal appearance of cervix.  Normal appearance of vaginal vault. No active bleeding.  Bimanual:  Cervical os closed.  No CMT.  Normal adnexa without masses or focal tenderness.  No masses palpated.  (Chaperone present)  Back:  Nontender.  Normal ambulation with steady gait.  Neurological:  Alert and oriented to person, place, time, and situation.    Psychiatric:  Cooperative, appropriate mood & affect.       Initial MDM and Data Review    58 y.o. female presenting for evaluation of dysuria with vaginal discomfort    Differential includes but is not limited to:  Acute cystitis, vaginitis, vaginal mass or other pathology, vulvovaginal candidiasis    Work-up includes:  Urinalysis, vag screen    Interventions include:  Antibiotics    The patient has significant medical comorbidities that influence decision making for this acute process, such as:  CKD, frequent UTIs    I decided to obtain the patient's medical records and review relevant documentation from hospital records and clinic records.  Pertinent information is noted.  I reviewed her prior visits, including recent urology visit.  I do not see any recent antibiotics.  She has been on cephalosporins.  Urine culture earlier this month was positive for E coli.  " Sensitive to cephalosporins.    Medications   cefpodoxime tablet 200 mg (200 mg Oral Given 1/24/24 1951)       Results and ED Course    Labs Reviewed   VAGINAL SCREEN - Abnormal; Notable for the following components:       Result Value    WBC - Vaginal Screen Rare (*)     Bacteria - Vaginal Screen Rare (*)     All other components within normal limits    Narrative:     Release to patient->Immediate   URINALYSIS, REFLEX TO URINE CULTURE - Abnormal; Notable for the following components:    Appearance, UA Hazy (*)     Protein, UA 2+ (*)     Occult Blood UA Trace (*)     Leukocytes, UA 3+ (*)     All other components within normal limits    Narrative:     Specimen Source->Urine   URINALYSIS MICROSCOPIC - Abnormal; Notable for the following components:    RBC, UA 6 (*)     WBC, UA >100 (*)     WBC Clumps, UA Occasional (*)     Bacteria Many (*)     All other components within normal limits    Narrative:     Specimen Source->Urine   CULTURE, URINE       Imaging Results    None         ED Course as of 01/24/24 2108 Wed Jan 24, 2024 1924 Urinalysis, Reflex to Urine Culture Urine, Clean Catch(!)  Treating for urinary tract infection [AC]   2003 Vaginal Screen(!)  No evidence of concomitant fungal infection [AC]      ED Course User Index  [AC] Paul Haynes, DO           Impression and Plan    58 y.o. female with findings of acute cystitis based on the work up in the emergency department as above.    She appears well.  Nontoxic.  She was tearful, and uncomfortable, and has evidence of acute cystitis    Important lab/imaging findings include:  As above    She will need close follow up with both gynecology and urology for continued and definitive management, including discussions regarding restarting vaginal estrogen to help prevent frequent urinary tract infections or if other interventions are warranted.    Abx ordered.    Can not do Pyridium due to CKD.    All tests, treatment options and disposition options were  discussed with the patient.  The decision was made to discharge the patient to home.    The patient was discharged in stable condition and all further questions/concerns by patient and/or family were addressed.    The patient will follow up with their primary care physician and specialist as discussed in the next several days or return if any further concerns or change in symptoms necessitating re-evaluation.           Final diagnoses:  [N30.00] Acute cystitis without hematuria (Primary)        ED Disposition Condition    Discharge Stable          ED Prescriptions       Medication Sig Dispense Start Date End Date Auth. Provider    cefUROXime (CEFTIN) 500 MG tablet  (Status: Discontinued) Take 1 tablet (500 mg total) by mouth every 12 (twelve) hours. for 7 days 14 tablet 1/24/2024 1/24/2024 Paul Haynes DO    cefpodoxime (VANTIN) 100 MG tablet Take 1 tablet (100 mg total) by mouth every 12 (twelve) hours. for 7 days 14 tablet 1/24/2024 1/31/2024 Paul Haynes DO          Follow-up Information       Follow up With Specialties Details Why Contact Info    Rocio Lindsay MD General Practice Schedule an appointment as soon as possible for a visit in 1 week As needed [id number]    [place] [id number] [phone]          Follow up with your urologist and your gynecologist for continued care               Paul Haynes DO  01/24/24 5780

## 2024-01-26 LAB
BACTERIA UR CULT: ABNORMAL
HPRA INTERPRETATION: NORMAL

## 2024-01-31 ENCOUNTER — TELEPHONE (OUTPATIENT)
Dept: NEPHROLOGY | Facility: CLINIC | Age: 59
End: 2024-01-31
Payer: COMMERCIAL

## 2024-01-31 NOTE — TELEPHONE ENCOUNTER
----- Message from Candice Jacobson sent at 1/31/2024  3:32 PM CST -----  Regarding: check up  Contact: patient  Type:  Sooner Appointment Request    Caller is requesting a sooner appointment.  Caller declined first available appointment listed below.  Caller will not accept being placed on the waitlist and is requesting a message be sent to doctor.    Name of Caller:  patient   When is the first available appointment?    Symptoms:  check up  Would the patient rather a call back or a response via [facility]?   Best Call Back Number:  [phone]    Additional Information:  call to be scheduled thanks; sent multiple message no response.

## 2024-02-01 ENCOUNTER — OFFICE VISIT (OUTPATIENT)
Dept: NEPHROLOGY | Facility: CLINIC | Age: 59
End: 2024-02-01
Payer: COMMERCIAL

## 2024-02-01 DIAGNOSIS — N05.1 FSGS (FOCAL SEGMENTAL GLOMERULOSCLEROSIS): ICD-10-CM

## 2024-02-01 DIAGNOSIS — R80.1 PERSISTENT PROTEINURIA: ICD-10-CM

## 2024-02-01 DIAGNOSIS — N18.5 CKD (CHRONIC KIDNEY DISEASE) STAGE 5, GFR LESS THAN 15 ML/MIN: Primary | ICD-10-CM

## 2024-02-01 DIAGNOSIS — N28.1 ACQUIRED CYST OF KIDNEY: ICD-10-CM

## 2024-02-01 DIAGNOSIS — N25.81 SECONDARY RENAL HYPERPARATHYROIDISM: ICD-10-CM

## 2024-02-01 PROCEDURE — 1160F RVW MEDS BY RX/DR IN RCRD: CPT | Mod: CPTII,95,, | Performed by: INTERNAL MEDICINE

## 2024-02-01 PROCEDURE — 99214 OFFICE O/P EST MOD 30 MIN: CPT | Mod: 95,,, | Performed by: INTERNAL MEDICINE

## 2024-02-01 PROCEDURE — 1159F MED LIST DOCD IN RCRD: CPT | Mod: CPTII,95,, | Performed by: INTERNAL MEDICINE

## 2024-02-01 PROCEDURE — 3066F NEPHROPATHY DOC TX: CPT | Mod: CPTII,95,, | Performed by: INTERNAL MEDICINE

## 2024-02-01 NOTE — PROGRESS NOTES
Subjective:       Patient ID: Jacqui Leonard is a 58 y.o. White female who presents for return patient evaluation for chronic renal failure.    The patient location is:  Patient Home   The chief complaint leading to consultation is: ckd  Visit type: Virtual visit with synchronous audio and video  Total time spent with patient: 24 minutes  Each patient to whom he or she provides medical services by telemedicine is:  (1) informed of the relationship between the physician and patient and the respective role of any other health care provider with respect to management of the patient; and (2) notified that he or she may decline to receive medical services by telemedicine and may withdraw from such care at any time.     She has had 2 UTIs since last time.  She has been on 2 rounds of antibiotics and has seen .  Her blood pressure at home has been 110-120/60-70.  There have been no recent hospitalizations or procedures.  She had a renal biopsy in 2006.  She had COVID in June 2022.  She just completed  a course of cephalosporin. She thinks her donor is not a match for her.      Review of Systems   Constitutional:  Positive for fatigue. Negative for appetite change, chills and fever.   HENT:  Negative for congestion.    Eyes:  Negative for visual disturbance.   Respiratory:  Positive for shortness of breath (with exertion). Negative for cough.    Cardiovascular:  Negative for chest pain and leg swelling.   Gastrointestinal:  Negative for nausea.   Genitourinary:  Negative for difficulty urinating, dysuria and hematuria.   Musculoskeletal:  Positive for gait problem. Negative for arthralgias and back pain.   Skin:  Negative for rash.   Neurological:  Negative for headaches.   Psychiatric/Behavioral:  Positive for decreased concentration (brain fog) and sleep disturbance. The patient is nervous/anxious.        The past medical, family and social histories were reviewed for this encounter     There were no  vitals taken for this visit.    Objective:      Physical Exam  Vitals reviewed.   Constitutional:       General: She is not in acute distress.     Appearance: She is well-developed.   HENT:      Head: Normocephalic and atraumatic.   Eyes:      General: No scleral icterus.  Pulmonary:      Effort: Pulmonary effort is normal.   Neurological:      Mental Status: She is alert and oriented to person, place, and time.   Psychiatric:         Mood and Affect: Mood normal.         Behavior: Behavior normal.        Assessment:       1. CKD (chronic kidney disease) stage 5, GFR less than 15 ml/min    2. Persistent proteinuria    3. Acquired cyst of kidney    4. Secondary renal hyperparathyroidism    5. FSGS (focal segmental glomerulosclerosis)        Lab Results   Component Value Date    CREATININE 4.2 (H) 12/06/2023    BUN 71 (H) 12/06/2023     12/06/2023    K 4.0 12/06/2023     12/06/2023    CO2 23 12/06/2023     Lab Results   Component Value Date    .8 (H) 08/02/2023    CALCIUM 10.7 (H) 12/06/2023    PHOS 5.7 (H) 12/06/2023     Lab Results   Component Value Date    HCT 33.0 (L) 08/02/2023     Prot/Creat Ratio, Urine   Date Value Ref Range Status   03/30/2023 3.02 (H) 0.00 - 0.20 Final   08/02/2022 5.38 (H) 0.00 - 0.20 Final   04/06/2022 1.92 (H) 0.00 - 0.20 Final     Plan:   Return to clinic in 4-6 weeks.  Labs for this week includes rp pth cbc upc at the [place] location on weekday or [facility] on weekend ([facility]).   RP 1 month.  Baseline creatinine is 1.2-1.5 prior to 2016.  She has been 1.8-2.4 since 2020.  PTH is 119 with a calcium of 10.7.  Continue calcitriol 0.5 mcg daily.   UPC is 3.0.  She used to be on an ACE I but is not on one currently due to low blood pressure.  Re-biopsy was not done due to increased bleeding risk.  Renal US shows R 10.5 cm L 10.7 cm.  [facility] referral was been ordered/completed.  We discussed making lifestyle changes and using a Mediterranean diet for health benefits and  weight loss.  She has been referred to Transplant and is listed.  She would consider PD if she needed it.  I will ask the PD nurse to contact her.  She could theoretically train on the [facility] and come see us on the [facility] for her visits if she wishes to stay with us.  Lets do prelim admit to [facility] for her to start PD when she gets a PD catheter.

## 2024-02-02 DIAGNOSIS — Z13.828 ENCOUNTER FOR SCREENING FOR OTHER MUSCULOSKELETAL DISORDER: ICD-10-CM

## 2024-02-02 DIAGNOSIS — N95.1 FEMALE CLIMACTERIC STATE: ICD-10-CM

## 2024-02-02 DIAGNOSIS — Z12.31 VISIT FOR SCREENING MAMMOGRAM: Primary | ICD-10-CM

## 2024-02-07 ENCOUNTER — LAB VISIT (OUTPATIENT)
Dept: LAB | Facility: HOSPITAL | Age: 59
End: 2024-02-07
Payer: COMMERCIAL

## 2024-02-07 ENCOUNTER — OFFICE VISIT (OUTPATIENT)
Dept: UROLOGY | Facility: CLINIC | Age: 59
End: 2024-02-07
Payer: COMMERCIAL

## 2024-02-07 ENCOUNTER — PATIENT MESSAGE (OUTPATIENT)
Dept: NEPHROLOGY | Facility: CLINIC | Age: 59
End: 2024-02-07
Payer: COMMERCIAL

## 2024-02-07 VITALS — BODY MASS INDEX: 34.48 KG/M2 | WEIGHT: 194.69 LBS

## 2024-02-07 DIAGNOSIS — N39.0 RECURRENT UTI: ICD-10-CM

## 2024-02-07 DIAGNOSIS — N18.5 CKD (CHRONIC KIDNEY DISEASE) STAGE 5, GFR LESS THAN 15 ML/MIN: ICD-10-CM

## 2024-02-07 DIAGNOSIS — R10.2 PELVIC PAIN: ICD-10-CM

## 2024-02-07 DIAGNOSIS — R35.89 FREQUENCY OF URINATION AND POLYURIA: Primary | ICD-10-CM

## 2024-02-07 DIAGNOSIS — R35.0 FREQUENCY OF URINATION AND POLYURIA: Primary | ICD-10-CM

## 2024-02-07 DIAGNOSIS — Z76.82 PRE-KIDNEY TRANSPLANT, LISTED: ICD-10-CM

## 2024-02-07 LAB
ALBUMIN SERPL BCP-MCNC: 3.5 G/DL (ref 3.5–5.2)
ANION GAP SERPL CALC-SCNC: 9 MMOL/L (ref 8–16)
BASOPHILS # BLD AUTO: 0.03 K/UL (ref 0–0.2)
BASOPHILS NFR BLD: 0.6 % (ref 0–1.9)
BUN SERPL-MCNC: 73 MG/DL (ref 6–20)
CALCIUM SERPL-MCNC: 10 MG/DL (ref 8.7–10.5)
CHLORIDE SERPL-SCNC: 112 MMOL/L (ref 95–110)
CO2 SERPL-SCNC: 22 MMOL/L (ref 23–29)
CREAT SERPL-MCNC: 4.2 MG/DL (ref 0.5–1.4)
DIFFERENTIAL METHOD BLD: ABNORMAL
EOSINOPHIL # BLD AUTO: 0.2 K/UL (ref 0–0.5)
EOSINOPHIL NFR BLD: 4.4 % (ref 0–8)
ERYTHROCYTE [DISTWIDTH] IN BLOOD BY AUTOMATED COUNT: 14.5 % (ref 11.5–14.5)
EST. GFR  (NO RACE VARIABLE): 11.6 ML/MIN/1.73 M^2
GLUCOSE SERPL-MCNC: 88 MG/DL (ref 70–110)
HCT VFR BLD AUTO: 32.8 % (ref 37–48.5)
HGB BLD-MCNC: 10.1 G/DL (ref 12–16)
IMM GRANULOCYTES # BLD AUTO: 0.02 K/UL (ref 0–0.04)
IMM GRANULOCYTES NFR BLD AUTO: 0.4 % (ref 0–0.5)
LYMPHOCYTES # BLD AUTO: 1.1 K/UL (ref 1–4.8)
LYMPHOCYTES NFR BLD: 22.5 % (ref 18–48)
MCH RBC QN AUTO: 28.6 PG (ref 27–31)
MCHC RBC AUTO-ENTMCNC: 30.8 G/DL (ref 32–36)
MCV RBC AUTO: 93 FL (ref 82–98)
MONOCYTES # BLD AUTO: 0.5 K/UL (ref 0.3–1)
MONOCYTES NFR BLD: 9.4 % (ref 4–15)
NEUTROPHILS # BLD AUTO: 3.2 K/UL (ref 1.8–7.7)
NEUTROPHILS NFR BLD: 62.7 % (ref 38–73)
NRBC BLD-RTO: 0 /100 WBC
PHOSPHATE SERPL-MCNC: 5.2 MG/DL (ref 2.7–4.5)
PLATELET # BLD AUTO: 249 K/UL (ref 150–450)
PMV BLD AUTO: 10 FL (ref 9.2–12.9)
POTASSIUM SERPL-SCNC: 4.3 MMOL/L (ref 3.5–5.1)
PTH-INTACT SERPL-MCNC: 182.5 PG/ML (ref 9–77)
RBC # BLD AUTO: 3.53 M/UL (ref 4–5.4)
SODIUM SERPL-SCNC: 143 MMOL/L (ref 136–145)
WBC # BLD AUTO: 5.02 K/UL (ref 3.9–12.7)

## 2024-02-07 PROCEDURE — 86833 HLA CLASS II HIGH DEFIN QUAL: CPT | Mod: PO,TXP | Performed by: NURSE PRACTITIONER

## 2024-02-07 PROCEDURE — 36415 COLL VENOUS BLD VENIPUNCTURE: CPT | Mod: PO,TXP | Performed by: INTERNAL MEDICINE

## 2024-02-07 PROCEDURE — 3066F NEPHROPATHY DOC TX: CPT | Mod: CPTII,NTX,S$GLB, | Performed by: STUDENT IN AN ORGANIZED HEALTH CARE EDUCATION/TRAINING PROGRAM

## 2024-02-07 PROCEDURE — 83970 ASSAY OF PARATHORMONE: CPT | Mod: NTX | Performed by: INTERNAL MEDICINE

## 2024-02-07 PROCEDURE — 3008F BODY MASS INDEX DOCD: CPT | Mod: CPTII,NTX,S$GLB, | Performed by: STUDENT IN AN ORGANIZED HEALTH CARE EDUCATION/TRAINING PROGRAM

## 2024-02-07 PROCEDURE — 85025 COMPLETE CBC W/AUTO DIFF WBC: CPT | Mod: NTX | Performed by: INTERNAL MEDICINE

## 2024-02-07 PROCEDURE — 1159F MED LIST DOCD IN RCRD: CPT | Mod: CPTII,NTX,S$GLB, | Performed by: STUDENT IN AN ORGANIZED HEALTH CARE EDUCATION/TRAINING PROGRAM

## 2024-02-07 PROCEDURE — 87086 URINE CULTURE/COLONY COUNT: CPT | Mod: TXP | Performed by: STUDENT IN AN ORGANIZED HEALTH CARE EDUCATION/TRAINING PROGRAM

## 2024-02-07 PROCEDURE — 80069 RENAL FUNCTION PANEL: CPT | Mod: TXP | Performed by: INTERNAL MEDICINE

## 2024-02-07 PROCEDURE — 87186 SC STD MICRODIL/AGAR DIL: CPT | Mod: NTX | Performed by: STUDENT IN AN ORGANIZED HEALTH CARE EDUCATION/TRAINING PROGRAM

## 2024-02-07 PROCEDURE — 86832 HLA CLASS I HIGH DEFIN QUAL: CPT | Mod: PO,TXP | Performed by: NURSE PRACTITIONER

## 2024-02-07 PROCEDURE — 87077 CULTURE AEROBIC IDENTIFY: CPT | Mod: TXP | Performed by: STUDENT IN AN ORGANIZED HEALTH CARE EDUCATION/TRAINING PROGRAM

## 2024-02-07 PROCEDURE — 99999 PR PBB SHADOW E&M-EST. PATIENT-LVL IV: CPT | Mod: PBBFAC,TXP,, | Performed by: STUDENT IN AN ORGANIZED HEALTH CARE EDUCATION/TRAINING PROGRAM

## 2024-02-07 PROCEDURE — 99214 OFFICE O/P EST MOD 30 MIN: CPT | Mod: NTX,S$GLB,, | Performed by: STUDENT IN AN ORGANIZED HEALTH CARE EDUCATION/TRAINING PROGRAM

## 2024-02-07 PROCEDURE — 87088 URINE BACTERIA CULTURE: CPT | Mod: NTX | Performed by: STUDENT IN AN ORGANIZED HEALTH CARE EDUCATION/TRAINING PROGRAM

## 2024-02-07 RX ORDER — CEFAZOLIN SODIUM 2 G/50ML
2 SOLUTION INTRAVENOUS
Status: CANCELLED | OUTPATIENT
Start: 2024-02-07

## 2024-02-07 RX ORDER — ROSUVASTATIN CALCIUM 20 MG/1
20 TABLET, COATED ORAL NIGHTLY
Qty: 90 TABLET | Refills: 3 | Status: SHIPPED | OUTPATIENT
Start: 2024-02-07

## 2024-02-07 NOTE — PROGRESS NOTES
Patient ID: Jacqui Leonard is a 58 y.o. female.    Chief Complaint: Recurrent UTI    HPI  58 y.o. who presents to the Urology clinic for evaluation of recurrent UTI and nocturia. Patient was treated for UTI detected during last clinic encounter. Patient again  seen in ED for UTI, discharged on antibiotics.    Voiding diary reviewed, uploaded in EMR.    Patient notes that she occasionally has pelvic pain which feel like shooting pelvic pain. She had issues with with levisin caused constipation. She has HTN.      Medically Necessary ROS documented in HPI    Past Medical History  Active Ambulatory Problems     Diagnosis Date Noted    Persistent proteinuria 10/09/2020    BMI 36.0-36.9,adult 02/09/2021    Acquired cyst of kidney 06/11/2021    Mass of uterine cervix 10/20/2021    Hematocolpos 10/22/2021    FSGS (focal segmental glomerulosclerosis) 08/05/2022    Secondary renal hyperparathyroidism 08/05/2022    Stage 5 chronic kidney disease not on chronic dialysis 08/05/2022    Mitral valve prolapse 11/03/2022    Class 2 severe obesity due to excess calories with serious comorbidity and body mass index (BMI) of 37.0 to 37.9 in adult 12/28/2022    Patient on waiting list for kidney transplant 12/28/2022     Resolved Ambulatory Problems     Diagnosis Date Noted    CKD (chronic kidney disease) stage 3, GFR 30-59 ml/min 12/20/2016    Pre-op testing 10/22/2021    COVID 06/30/2022    Preoperative clearance 12/28/2022     Past Medical History:   Diagnosis Date    Abnormal Pap smear     Chronic kidney disease     Depression     GERD (gastroesophageal reflux disease)     Mitral valve insufficiency     Obesity     Sleep apnea     Thyroid disease          Past Surgical History  Past Surgical History:   Procedure Laterality Date    BUNIONECTOMY      CERVICAL BIOPSY  W/ LOOP ELECTRODE EXCISION      @ 2012 with all normal follow-ups    DILATION OF CERVIX N/A 10/22/2021    Procedure: DILATION, CERVIX;  Surgeon: Elvis Dutta MD;   Location: [facility] OR;  Service: OB/GYN;  Laterality: N/A;  evacuation of uterus, fluid    EXAMINATION UNDER ANESTHESIA N/A 10/22/2021    Procedure: Exam under anesthesia;  Surgeon: Elvis Dutta MD;  Location: [facility] OR;  Service: OB/GYN;  Laterality: N/A;    FOOT SURGERY      x2    TUBAL LIGATION         Social History  Social Connections: Not on file       Medications    Current Outpatient Medications:     calcitRIOL (ROCALTROL) 0.5 MCG Cap, TAKE 1 CAPSULE (0.5 MCG TOTAL) BY MOUTH ONCE DAILY., Disp: 30 capsule, Rfl: 5    clotrimazole-betamethasone 1-0.05% (LOTRISONE) cream, Apply 1 g topically as needed., Disp: , Rfl:     hyoscyamine (ANASPAZ,LEVSIN) 0.125 mg Tab, Take 1 tablet (125 mcg total) by mouth every 4 (four) hours as needed., Disp: 30 tablet, Rfl: 0    Lactobacillus rhamnosus GG (CULTURELLE) 10 billion cell capsule, Take 1 capsule by mouth once daily., Disp: , Rfl:     rosuvastatin (CRESTOR) 20 MG tablet, TAKE 1 TABLET BY MOUTH EVERY DAY IN THE EVENING, Disp: 90 tablet, Rfl: 3    Allergies  Review of patient's allergies indicates:  No Known Allergies    Patient's PMH, FH, Social hx, Medications, allergies reviewed and updated as pertinent to today's visit    Objective:      Physical Exam  Constitutional:       Appearance: She is well-developed.   HENT:      Head: Normocephalic and atraumatic.   Eyes:      Conjunctiva/sclera: Conjunctivae normal.   Pulmonary:      Effort: Pulmonary effort is normal. No respiratory distress.   Abdominal:      General: Abdomen is flat. There is no distension.      Palpations: Abdomen is soft.      Tenderness: There is no right CVA tenderness or left CVA tenderness.   Skin:     General: Skin is warm.      Findings: No rash.   Neurological:      Mental Status: She is alert and oriented to person, place, and time.             Assessment:       1. Frequency of urination and polyuria    2. Recurrent UTI    3. Pelvic pain        Plan:         Polyuria and urinary frequency  Given  degree of CKD, best management by nephrologist as there is no functional bladder problem  There is no OAB medication safe for patient to take. Developed constipation with anticholinergic. Mirabegron contraindicated with HTN. And vibregron has not been studied in patient's w/ renal failure.     Recurrent UTI  Reviewed with patient that she was indeed prescribed antibiotics for the UTI detected on 1/10/2024; cefdinir ( Rx provided on 1/12 when cultures returned). Our staff also called her to confirm receipt of this new medication on 1/12/24 at 9 38 am.  Patient w/ concerns re:  topical estrogen which is the most effective way to reduce incidents of UTI  Urine cultures essential to treating appropriate vs OAB  Avoid constipation  Cystoscopy planned, hydro distention discussed, possible plan  Urine culture to eval for UTI      Pelvic pain  Discussed PFPT, referral placed

## 2024-02-08 ENCOUNTER — HOSPITAL ENCOUNTER (OUTPATIENT)
Dept: RADIOLOGY | Facility: HOSPITAL | Age: 59
Discharge: HOME | End: 2024-02-08
Attending: INTERNAL MEDICINE
Payer: COMMERCIAL

## 2024-02-08 ENCOUNTER — HOSPITAL ENCOUNTER (OUTPATIENT)
Dept: PREADMISSION TESTING | Facility: HOSPITAL | Age: 59
Discharge: HOME | End: 2024-02-08
Attending: STUDENT IN AN ORGANIZED HEALTH CARE EDUCATION/TRAINING PROGRAM
Payer: COMMERCIAL

## 2024-02-08 VITALS
DIASTOLIC BLOOD PRESSURE: 75 MMHG | BODY MASS INDEX: 34.5 KG/M2 | HEART RATE: 64 BPM | RESPIRATION RATE: 18 BRPM | HEIGHT: 63 IN | OXYGEN SATURATION: 100 % | WEIGHT: 194.69 LBS | TEMPERATURE: 96 F | SYSTOLIC BLOOD PRESSURE: 136 MMHG

## 2024-02-08 DIAGNOSIS — R10.2 PELVIC PAIN: ICD-10-CM

## 2024-02-08 DIAGNOSIS — Z12.31 ENCOUNTER FOR SCREENING MAMMOGRAM FOR BREAST CANCER: ICD-10-CM

## 2024-02-08 DIAGNOSIS — R35.0 FREQUENCY OF URINATION AND POLYURIA: ICD-10-CM

## 2024-02-08 DIAGNOSIS — N30.10 INTERSTITIAL CYSTITIS: ICD-10-CM

## 2024-02-08 DIAGNOSIS — R35.89 FREQUENCY OF URINATION AND POLYURIA: ICD-10-CM

## 2024-02-08 LAB
OHS QRS DURATION: 94 MS
OHS QTC CALCULATION: 412 MS

## 2024-02-08 PROCEDURE — 77063 BREAST TOMOSYNTHESIS BI: CPT | Mod: 26,TXP,, | Performed by: RADIOLOGY

## 2024-02-08 PROCEDURE — 93005 ELECTROCARDIOGRAM TRACING: CPT | Mod: TXP

## 2024-02-08 PROCEDURE — 93010 ELECTROCARDIOGRAM REPORT: CPT | Mod: NTX,,, | Performed by: INTERNAL MEDICINE

## 2024-02-08 PROCEDURE — 77067 SCR MAMMO BI INCL CAD: CPT | Mod: 26,TXP,, | Performed by: RADIOLOGY

## 2024-02-08 PROCEDURE — 77067 SCR MAMMO BI INCL CAD: CPT | Mod: TC,PO,TXP

## 2024-02-08 NOTE — DISCHARGE INSTRUCTIONS
YOUR PROCEDURE WILL BE AT [facility] at [address] [place] [address]                  Before 7 AM, enter through the Emergency Entrance..   After 7 AM enter through the Main Entrance.                 Report to the Same Day Surgery Registration Desk in the hallway.(Just beside the Same Day Surgery Unit)      Your procedure  is scheduled for __2/19/2024________.    Call [phone] between 2pm and 5pm on ___2/16/2024____to find out your arrival time for the day of surgery.    You may have two visitors.  No children under 12 years old.     You will be going to the Same Day Surgery Unit on the 2nd floor of the hospital.    Important instructions:  Do not eat anything after midnight.  You may have plain water, non carbonated.  You may also have Gatorade or Powerade after midnight.    Stop all fluids 2 hours before your surgery.    It is okay to brush your teeth.  Do not have gum, candy or mints.    SEE MEDICATION SHEET.   TAKE MEDICATIONS AS DIRECTED WITH SIPS OF WATER.      Do not take any diabetic medication on the morning of surgery unless instructed to do so by your doctor or pre op nurse.      All GLP-1 weekly diabetic/weight loss medications must not be taken for one week before your surgery, or your surgery could be canceled.      STOP taking Aspirin, Ibuprofen,  Advil, Motrin, Mobic(meloxicam), Aleve (naproxen), Fish oil, and Vitamin E for at least 7 days before your surgery.     You may take Tylenol if needed which is not a blood thinner.    Please shower the night before and the morning of your surgery.      Follow any Prep Instructions given by your surgeon.    Use Chlorhexidine soap as instructed by your pre op nurse.   Please place clean linens on your bed the night before surgery. Please wear fresh clean clothing after each shower.    No shaving of procedural area at least 4-5 days before surgery due to increased risk of skin irritation and/or possible  infection.    Female patients may be asked for a urine specimen on the morning of the surgery.  Please check with your nurse before using the restroom.    Contact lenses and removable denture work may not be worn during your procedure.    You may wear deodorant only. If you are having breast surgery, do not wear deodorant on the operative side.    Do not wear powder, body lotion, perfume/cologne or make-up.    Do not wear any jewelry or have any metal on your body.    You will be asked to remove any dentures or partials for the procedure.    If you are going home on the same day of surgery, you must arrange for a family member or a friend to drive you home.  Public transportation is prohibited.  You will not be able to drive home if you were given anesthesia or sedation.    Patients who want to have their Post-op prescriptions filled from our in-house [facility], bring a Credit/Debit Card or cash with you. A co-pay may be required.  The pharmacy closes at 5:30 pm.    Wear loose fitting clothes allowing for bandages.    Please leave money and valuables home.      You may bring your cell phone.    Call the doctor if fever or illness should occur before your surgery.    Call [phone] to contact us here if needed.                            CLOTHES ON DAY OF SURGERY    SHOULDER surgery:  you must have a very oversized shirt.  Very, Very large.  You will probably have a large sling on with your arm strapped to your chest.  You will not be able to put the arm of the operated shoulder into a sleeve.  You can put the arm of the un-operated shoulder into the sleeve, but the shirt will need to be draped over the operated shoulder.       ARM or HAND surgery:  make sure that your sleeves are large and loose enough to pass over large dressings or cast.      BREAST or UNDERARM surgery:  wear a loose, button down shirt so that you can dress without raising your arms over your head.    ABDOMINAL surgery:  wear loose,  comfortable clothing.  Nothing tight around the abdomen.  NO JEANS    PENIS or SCROTAL surgery:  loose comfortable clothing.  Large sweat pants, pajama pants or a robe.  ABSOLUTELY NO JEANS      LEG or FOOT surgery:  wear large loose pants that are able to pass over any large dressings or casts.  You could also wear loose shorts or a skirt.

## 2024-02-09 ENCOUNTER — TELEPHONE (OUTPATIENT)
Dept: UROLOGY | Facility: CLINIC | Age: 59
End: 2024-02-09
Payer: COMMERCIAL

## 2024-02-09 LAB — BACTERIA UR CULT: ABNORMAL

## 2024-02-09 RX ORDER — CEPHALEXIN 500 MG/1
500 CAPSULE ORAL EVERY 12 HOURS
Qty: 20 CAPSULE | Refills: 0 | Status: SHIPPED | OUTPATIENT
Start: 2024-02-09 | End: 2024-02-19

## 2024-02-09 NOTE — TELEPHONE ENCOUNTER
Pt was contacted pt was notified of results and abx  ----- Message from Magdalena Tabares MD sent at 2/9/2024  9:01 AM CST -----  pls notify of medication sent to pharmacy for uti, 10 d course sent

## 2024-02-16 ENCOUNTER — PATIENT MESSAGE (OUTPATIENT)
Dept: UROLOGY | Facility: CLINIC | Age: 59
End: 2024-02-16
Payer: COMMERCIAL

## 2024-02-20 PROCEDURE — 99001 SPECIMEN HANDLING PT-LAB: CPT | Mod: PO,TXP | Performed by: NURSE PRACTITIONER

## 2024-02-26 ENCOUNTER — TELEPHONE (OUTPATIENT)
Dept: UROLOGY | Facility: CLINIC | Age: 59
End: 2024-02-26
Payer: COMMERCIAL

## 2024-02-26 ENCOUNTER — PATIENT MESSAGE (OUTPATIENT)
Dept: UROLOGY | Facility: CLINIC | Age: 59
End: 2024-02-26
Payer: COMMERCIAL

## 2024-02-26 NOTE — TELEPHONE ENCOUNTER
----- Message from Rony Kiran sent at 2/26/2024 12:31 PM CST -----  Type: Patient Call Back    Who called:Self    What is the request in detail:In regards to uti    Can the clinic reply by [facility]?No    Would the patient rather a call back or a response via [facility]? Call    Best call back number:[phone] (home)       Additional Information:

## 2024-02-28 ENCOUNTER — OFFICE VISIT (OUTPATIENT)
Dept: UROLOGY | Facility: CLINIC | Age: 59
End: 2024-02-28
Payer: COMMERCIAL

## 2024-02-28 DIAGNOSIS — N39.0 RECURRENT UTI: Primary | ICD-10-CM

## 2024-02-28 DIAGNOSIS — N95.8 GENITOURINARY SYNDROME OF MENOPAUSE: ICD-10-CM

## 2024-02-28 PROCEDURE — 3066F NEPHROPATHY DOC TX: CPT | Mod: CPTII,NTX,S$GLB, | Performed by: STUDENT IN AN ORGANIZED HEALTH CARE EDUCATION/TRAINING PROGRAM

## 2024-02-28 PROCEDURE — 1159F MED LIST DOCD IN RCRD: CPT | Mod: CPTII,NTX,S$GLB, | Performed by: STUDENT IN AN ORGANIZED HEALTH CARE EDUCATION/TRAINING PROGRAM

## 2024-02-28 PROCEDURE — 87086 URINE CULTURE/COLONY COUNT: CPT | Mod: TXP | Performed by: STUDENT IN AN ORGANIZED HEALTH CARE EDUCATION/TRAINING PROGRAM

## 2024-02-28 PROCEDURE — 1160F RVW MEDS BY RX/DR IN RCRD: CPT | Mod: CPTII,NTX,S$GLB, | Performed by: STUDENT IN AN ORGANIZED HEALTH CARE EDUCATION/TRAINING PROGRAM

## 2024-02-28 PROCEDURE — 87088 URINE BACTERIA CULTURE: CPT | Mod: NTX | Performed by: STUDENT IN AN ORGANIZED HEALTH CARE EDUCATION/TRAINING PROGRAM

## 2024-02-28 PROCEDURE — 99214 OFFICE O/P EST MOD 30 MIN: CPT | Mod: NTX,S$GLB,, | Performed by: STUDENT IN AN ORGANIZED HEALTH CARE EDUCATION/TRAINING PROGRAM

## 2024-02-28 PROCEDURE — 87186 SC STD MICRODIL/AGAR DIL: CPT | Mod: TXP | Performed by: STUDENT IN AN ORGANIZED HEALTH CARE EDUCATION/TRAINING PROGRAM

## 2024-02-28 PROCEDURE — 99999 PR PBB SHADOW E&M-EST. PATIENT-LVL II: CPT | Mod: PBBFAC,TXP,, | Performed by: STUDENT IN AN ORGANIZED HEALTH CARE EDUCATION/TRAINING PROGRAM

## 2024-02-28 PROCEDURE — 87077 CULTURE AEROBIC IDENTIFY: CPT | Mod: TXP | Performed by: STUDENT IN AN ORGANIZED HEALTH CARE EDUCATION/TRAINING PROGRAM

## 2024-02-28 RX ORDER — ESTRADIOL 0.1 MG/G
1 CREAM VAGINAL
Qty: 42.5 G | Refills: 3 | Status: SHIPPED | OUTPATIENT
Start: 2024-02-29 | End: 2025-02-28

## 2024-02-28 NOTE — PROGRESS NOTES
Patient ID: Jacqui Leonard is a 58 y.o. female.    Chief Complaint: Urinary Tract Infection      HPI  58 y.o. who presents to the Urology clinic for evaluation of UTI due to positive home strip test. Symptoms include pressure, odor, frequency- present for past 4 days. Symptoms improved with pyridium. Denies flank pain, fevers, chills.       Medically Necessary ROS documented in HPI    Past Medical History  Active Ambulatory Problems     Diagnosis Date Noted    Persistent proteinuria 10/09/2020    BMI 36.0-36.9,adult 02/09/2021    Acquired cyst of kidney 06/11/2021    Mass of uterine cervix 10/20/2021    Hematocolpos 10/22/2021    FSGS (focal segmental glomerulosclerosis) 08/05/2022    Secondary renal hyperparathyroidism 08/05/2022    Stage 5 chronic kidney disease not on chronic dialysis 08/05/2022    Mitral valve prolapse 11/03/2022    Class 2 severe obesity due to excess calories with serious comorbidity and body mass index (BMI) of 37.0 to 37.9 in adult 12/28/2022    Patient on waiting list for kidney transplant 12/28/2022     Resolved Ambulatory Problems     Diagnosis Date Noted    CKD (chronic kidney disease) stage 3, GFR 30-59 ml/min 12/20/2016    Pre-op testing 10/22/2021    COVID 06/30/2022    Preoperative clearance 12/28/2022     Past Medical History:   Diagnosis Date    Abnormal Pap smear     Chronic kidney disease     Depression     GERD (gastroesophageal reflux disease)     Mitral valve insufficiency     Obesity     Sleep apnea     Thyroid disease          Past Surgical History  Past Surgical History:   Procedure Laterality Date    BUNIONECTOMY      CERVICAL BIOPSY  W/ LOOP ELECTRODE EXCISION      @ 2012 with all normal follow-ups    DILATION OF CERVIX N/A 10/22/2021    Procedure: DILATION, CERVIX;  Surgeon: Elvis Dutta MD;  Location: [facility];  Service: OB/GYN;  Laterality: N/A;  evacuation of uterus, fluid    EXAMINATION UNDER ANESTHESIA N/A 10/22/2021    Procedure: Exam under anesthesia;   Surgeon: Elvis Dutta MD;  Location: [facility] OR;  Service: OB/GYN;  Laterality: N/A;    FOOT SURGERY      x2    TUBAL LIGATION         Social History  Social Connections: Not on file       Medications    Current Outpatient Medications:     calcitRIOL (ROCALTROL) 0.5 MCG Cap, TAKE 1 CAPSULE (0.5 MCG TOTAL) BY MOUTH ONCE DAILY., Disp: 30 capsule, Rfl: 5    clotrimazole-betamethasone 1-0.05% (LOTRISONE) cream, Apply 1 g topically as needed., Disp: , Rfl:     Lactobacillus rhamnosus GG (CULTURELLE) 10 billion cell capsule, Take 1 capsule by mouth once daily., Disp: , Rfl:     rosuvastatin (CRESTOR) 20 MG tablet, TAKE 1 TABLET BY MOUTH EVERY DAY IN THE EVENING, Disp: 90 tablet, Rfl: 3    Allergies  Review of patient's allergies indicates:  No Known Allergies    Patient's PMH, FH, Social hx, Medications, allergies reviewed and updated as pertinent to today's visit    Objective:      Physical Exam  Constitutional:       Appearance: She is well-developed.   HENT:      Head: Normocephalic and atraumatic.   Eyes:      Conjunctiva/sclera: Conjunctivae normal.   Pulmonary:      Effort: Pulmonary effort is normal. No respiratory distress.   Abdominal:      General: Abdomen is flat. There is no distension.      Palpations: Abdomen is soft. There is no mass.      Tenderness: There is no abdominal tenderness. There is no right CVA tenderness, left CVA tenderness or guarding.   Skin:     General: Skin is warm.      Findings: No rash.   Neurological:      Mental Status: She is alert and oriented to person, place, and time.   Psychiatric:         Behavior: Behavior normal.             Assessment:       1. Recurrent UTI    2. Genitourinary syndrome of menopause        Plan:       POCT UA w/ possible infection, pt on pyridium  Urine culture  Cefdinir x 5 days  of UTI  Urine cultures essential to treating appropriate vs OAB  Avoid constipation   Topical estrogen recommended and encouraged

## 2024-03-01 ENCOUNTER — PATIENT MESSAGE (OUTPATIENT)
Dept: UROLOGY | Facility: CLINIC | Age: 59
End: 2024-03-01
Payer: COMMERCIAL

## 2024-03-01 LAB — BACTERIA UR CULT: ABNORMAL

## 2024-03-01 RX ORDER — CEFDINIR 300 MG/1
300 CAPSULE ORAL DAILY
Qty: 5 CAPSULE | Refills: 0 | Status: SHIPPED | OUTPATIENT
Start: 2024-03-01 | End: 2024-03-04

## 2024-03-04 DIAGNOSIS — N39.0 RECURRENT UTI: Primary | ICD-10-CM

## 2024-03-04 RX ORDER — CEPHALEXIN 500 MG/1
500 CAPSULE ORAL EVERY 12 HOURS
Qty: 20 CAPSULE | Refills: 0 | Status: SHIPPED | OUTPATIENT
Start: 2024-03-04 | End: 2024-03-14

## 2024-03-04 NOTE — PROGRESS NOTES
Patient declined cefdinir, requesting keflex  New medication sent  ID referral placed for future infections/ evaluation

## 2024-03-05 ENCOUNTER — PATIENT MESSAGE (OUTPATIENT)
Dept: TRANSPLANT | Facility: CLINIC | Age: 59
End: 2024-03-05
Payer: COMMERCIAL

## 2024-03-06 ENCOUNTER — LAB VISIT (OUTPATIENT)
Dept: LAB | Facility: HOSPITAL | Age: 59
End: 2024-03-06
Attending: NURSE PRACTITIONER
Payer: COMMERCIAL

## 2024-03-06 DIAGNOSIS — Z76.82 PRE-KIDNEY TRANSPLANT, LISTED: ICD-10-CM

## 2024-03-06 DIAGNOSIS — N18.5 CKD (CHRONIC KIDNEY DISEASE) STAGE 5, GFR LESS THAN 15 ML/MIN: ICD-10-CM

## 2024-03-06 LAB
ALBUMIN SERPL BCP-MCNC: 3.3 G/DL (ref 3.5–5.2)
ANION GAP SERPL CALC-SCNC: 9 MMOL/L (ref 8–16)
BUN SERPL-MCNC: 58 MG/DL (ref 6–20)
CALCIUM SERPL-MCNC: 9.8 MG/DL (ref 8.7–10.5)
CHLORIDE SERPL-SCNC: 113 MMOL/L (ref 95–110)
CO2 SERPL-SCNC: 21 MMOL/L (ref 23–29)
CREAT SERPL-MCNC: 3.9 MG/DL (ref 0.5–1.4)
EST. GFR  (NO RACE VARIABLE): 12.7 ML/MIN/1.73 M^2
GLUCOSE SERPL-MCNC: 86 MG/DL (ref 70–110)
PHOSPHATE SERPL-MCNC: 4.7 MG/DL (ref 2.7–4.5)
POTASSIUM SERPL-SCNC: 4.7 MMOL/L (ref 3.5–5.1)
SODIUM SERPL-SCNC: 143 MMOL/L (ref 136–145)

## 2024-03-06 PROCEDURE — 80069 RENAL FUNCTION PANEL: CPT | Mod: NTX | Performed by: INTERNAL MEDICINE

## 2024-03-06 PROCEDURE — 36415 COLL VENOUS BLD VENIPUNCTURE: CPT | Mod: PO,NTX | Performed by: INTERNAL MEDICINE

## 2024-03-06 PROCEDURE — 99001 SPECIMEN HANDLING PT-LAB: CPT | Mod: PO,TXP | Performed by: NURSE PRACTITIONER

## 2024-03-07 LAB — HPRA INTERPRETATION: NORMAL

## 2024-03-08 ENCOUNTER — TELEPHONE (OUTPATIENT)
Dept: NEPHROLOGY | Facility: CLINIC | Age: 59
End: 2024-03-08

## 2024-03-08 ENCOUNTER — OFFICE VISIT (OUTPATIENT)
Dept: NEPHROLOGY | Facility: CLINIC | Age: 59
End: 2024-03-08
Payer: COMMERCIAL

## 2024-03-08 DIAGNOSIS — R80.1 PERSISTENT PROTEINURIA: ICD-10-CM

## 2024-03-08 DIAGNOSIS — E66.01 SEVERE OBESITY: ICD-10-CM

## 2024-03-08 DIAGNOSIS — N05.1 FSGS (FOCAL SEGMENTAL GLOMERULOSCLEROSIS): ICD-10-CM

## 2024-03-08 DIAGNOSIS — N18.5 CKD (CHRONIC KIDNEY DISEASE) STAGE 5, GFR LESS THAN 15 ML/MIN: Primary | ICD-10-CM

## 2024-03-08 DIAGNOSIS — N28.1 ACQUIRED CYST OF KIDNEY: ICD-10-CM

## 2024-03-08 DIAGNOSIS — N25.81 SECONDARY RENAL HYPERPARATHYROIDISM: ICD-10-CM

## 2024-03-08 PROCEDURE — 1160F RVW MEDS BY RX/DR IN RCRD: CPT | Mod: CPTII,95,, | Performed by: INTERNAL MEDICINE

## 2024-03-08 PROCEDURE — 1159F MED LIST DOCD IN RCRD: CPT | Mod: CPTII,95,, | Performed by: INTERNAL MEDICINE

## 2024-03-08 PROCEDURE — 3066F NEPHROPATHY DOC TX: CPT | Mod: CPTII,95,, | Performed by: INTERNAL MEDICINE

## 2024-03-08 PROCEDURE — 99214 OFFICE O/P EST MOD 30 MIN: CPT | Mod: 95,,, | Performed by: INTERNAL MEDICINE

## 2024-03-08 NOTE — TELEPHONE ENCOUNTER
wants to see the patient in 6 wks. For a  VV. I was able to do 8 wks not 6. I am  sending this message to Glo to see if she can schedule sooner.

## 2024-03-08 NOTE — PROGRESS NOTES
Subjective:       Patient ID: Jacqui Leonard is a 58 y.o. White female who presents for return patient evaluation for chronic renal failure.    The patient location is:  Patient Home   The chief complaint leading to consultation is: ckd  Visit type: Virtual visit with synchronous audio and video  Total time spent with patient: 24 minutes  Each patient to whom he or she provides medical services by telemedicine is:  (1) informed of the relationship between the physician and patient and the respective role of any other health care provider with respect to management of the patient; and (2) notified that he or she may decline to receive medical services by telemedicine and may withdraw from such care at any time.     She has another UTI and is seeing .  Her blood pressure at home has been 120/60-70.  There have been no recent hospitalizations or procedures.  She had a renal biopsy in 2006.  She had COVID in June 2022.  She thinks her donor eval will be complete soon.      Review of Systems   Constitutional:  Positive for fatigue. Negative for appetite change, chills and fever.   HENT:  Negative for congestion.    Eyes:  Negative for visual disturbance.   Respiratory:  Positive for shortness of breath (with exertion). Negative for cough.    Cardiovascular:  Negative for chest pain and leg swelling.   Gastrointestinal:  Negative for nausea.   Genitourinary:  Negative for difficulty urinating, dysuria and hematuria.   Musculoskeletal:  Positive for gait problem and neck pain. Negative for arthralgias and back pain.   Skin:  Negative for rash.   Neurological:  Positive for headaches (occasional).   Psychiatric/Behavioral:  Positive for decreased concentration (brain fog) and sleep disturbance. The patient is nervous/anxious.        The past medical, family and social histories were reviewed for this encounter     There were no vitals taken for this visit.    Objective:      Physical Exam  Vitals reviewed.    Constitutional:       General: She is not in acute distress.     Appearance: She is well-developed.   HENT:      Head: Normocephalic and atraumatic.   Eyes:      General: No scleral icterus.  Pulmonary:      Effort: Pulmonary effort is normal.   Neurological:      Mental Status: She is alert and oriented to person, place, and time.   Psychiatric:         Mood and Affect: Mood normal.         Behavior: Behavior normal.        Assessment:       1. CKD (chronic kidney disease) stage 5, GFR less than 15 ml/min    2. Persistent proteinuria    3. Acquired cyst of kidney    4. Secondary renal hyperparathyroidism    5. Severe obesity    6. FSGS (focal segmental glomerulosclerosis)        Lab Results   Component Value Date    CREATININE 3.9 (H) 03/06/2024    BUN 58 (H) 03/06/2024     03/06/2024    K 4.7 03/06/2024     (H) 03/06/2024    CO2 21 (L) 03/06/2024     Lab Results   Component Value Date    .5 (H) 02/07/2024    CALCIUM 9.8 03/06/2024    PHOS 4.7 (H) 03/06/2024     Lab Results   Component Value Date    HCT 32.8 (L) 02/07/2024     Prot/Creat Ratio, Urine   Date Value Ref Range Status   03/30/2023 3.02 (H) 0.00 - 0.20 Final   08/02/2022 5.38 (H) 0.00 - 0.20 Final   04/06/2022 1.92 (H) 0.00 - 0.20 Final     Plan:   Return to clinic in 4-6 weeks.  Labs for this week includes rp at the [place] location on weekday or [facility] on weekend ([facility]).     Baseline creatinine is 1.2-1.5 prior to 2016.  She has been 1.8-2.4 since 2020.  PTH is 182 with a calcium of 9.8.  Continue calcitriol 0.5 mcg daily.   UPC is 3.0.  She used to be on an ACE I but is not on one currently due to low blood pressure.  Re-biopsy was not done due to increased bleeding risk.  Renal US shows R 10.5 cm L 10.7 cm.  Kidney Smart referral was been ordered/completed.  We discussed making lifestyle changes and using a Mediterranean diet for health benefits and weight loss.  She has been referred to Transplant and is listed.  She will do PD  when she needs it.  I will ask the PD nurse to contact her.  Plan to admit to [facility] for her to start PD when she gets a PD catheter.

## 2024-04-01 ENCOUNTER — TELEPHONE (OUTPATIENT)
Dept: NEPHROLOGY | Facility: CLINIC | Age: 59
End: 2024-04-01
Payer: COMMERCIAL

## 2024-04-01 LAB
CLASS I ANTIBODIES - LUMINEX: NORMAL
CLASS I ANTIBODY COMMENTS - LUMINEX: NORMAL
CLASS II ANTIBODIES - LUMINEX: NORMAL
CLASS II ANTIBODY COMMENTS - LUMINEX: NORMAL
CPRA %: 42
SERUM COLLECTION DT - LUMINEX CLASS I: NORMAL
SERUM COLLECTION DT - LUMINEX CLASS II: NORMAL
SPCL1 TESTING DATE: NORMAL
SPCL2 TESTING DATE: NORMAL
SPCLU TESTING DATE: NORMAL

## 2024-04-01 NOTE — TELEPHONE ENCOUNTER
----- Message from Candice Jacobson sent at 4/1/2024  3:08 PM CDT -----  Regarding: advise  Contact: pt  Type: Needs Medical Advice  Who Called:  nurse   Symptoms (please be specific):    How long has patient had these symptoms:    Pharmacy name and phone #:    Best Call Back Number: [phone]    Additional Information: i have a nurse seeking home dialysis they visited pt the only problem there having is they have to get rid of her cats call back #: [phone] MRN: [id number] ALCIRA AGUILAR

## 2024-04-03 ENCOUNTER — LAB VISIT (OUTPATIENT)
Dept: LAB | Facility: HOSPITAL | Age: 59
End: 2024-04-03
Payer: COMMERCIAL

## 2024-04-03 DIAGNOSIS — N18.5 CKD (CHRONIC KIDNEY DISEASE) STAGE 5, GFR LESS THAN 15 ML/MIN: ICD-10-CM

## 2024-04-03 DIAGNOSIS — Z76.82 PRE-KIDNEY TRANSPLANT, LISTED: ICD-10-CM

## 2024-04-03 LAB
ALBUMIN SERPL BCP-MCNC: 3.4 G/DL (ref 3.5–5.2)
ANION GAP SERPL CALC-SCNC: 6 MMOL/L (ref 8–16)
BUN SERPL-MCNC: 66 MG/DL (ref 6–20)
CALCIUM SERPL-MCNC: 10.6 MG/DL (ref 8.7–10.5)
CHLORIDE SERPL-SCNC: 110 MMOL/L (ref 95–110)
CO2 SERPL-SCNC: 24 MMOL/L (ref 23–29)
CREAT SERPL-MCNC: 4.3 MG/DL (ref 0.5–1.4)
EST. GFR  (NO RACE VARIABLE): 11.3 ML/MIN/1.73 M^2
GLUCOSE SERPL-MCNC: 89 MG/DL (ref 70–110)
PHOSPHATE SERPL-MCNC: 6 MG/DL (ref 2.7–4.5)
POTASSIUM SERPL-SCNC: 4.7 MMOL/L (ref 3.5–5.1)
SODIUM SERPL-SCNC: 140 MMOL/L (ref 136–145)

## 2024-04-03 PROCEDURE — 80069 RENAL FUNCTION PANEL: CPT | Mod: NTX | Performed by: INTERNAL MEDICINE

## 2024-04-03 PROCEDURE — 36415 COLL VENOUS BLD VENIPUNCTURE: CPT | Mod: PO,TXP | Performed by: INTERNAL MEDICINE

## 2024-04-19 ENCOUNTER — OFFICE VISIT (OUTPATIENT)
Dept: NEPHROLOGY | Facility: CLINIC | Age: 59
End: 2024-04-19
Payer: COMMERCIAL

## 2024-04-19 DIAGNOSIS — R80.1 PERSISTENT PROTEINURIA: ICD-10-CM

## 2024-04-19 DIAGNOSIS — N28.1 ACQUIRED CYST OF KIDNEY: ICD-10-CM

## 2024-04-19 DIAGNOSIS — N25.81 SECONDARY RENAL HYPERPARATHYROIDISM: ICD-10-CM

## 2024-04-19 DIAGNOSIS — E66.01 SEVERE OBESITY: ICD-10-CM

## 2024-04-19 DIAGNOSIS — N18.5 CKD (CHRONIC KIDNEY DISEASE) STAGE 5, GFR LESS THAN 15 ML/MIN: Primary | ICD-10-CM

## 2024-04-19 DIAGNOSIS — N05.1 FSGS (FOCAL SEGMENTAL GLOMERULOSCLEROSIS): ICD-10-CM

## 2024-04-19 PROCEDURE — 99214 OFFICE O/P EST MOD 30 MIN: CPT | Mod: 95,,, | Performed by: INTERNAL MEDICINE

## 2024-04-19 PROCEDURE — 1160F RVW MEDS BY RX/DR IN RCRD: CPT | Mod: CPTII,95,, | Performed by: INTERNAL MEDICINE

## 2024-04-19 PROCEDURE — 3066F NEPHROPATHY DOC TX: CPT | Mod: CPTII,95,, | Performed by: INTERNAL MEDICINE

## 2024-04-19 PROCEDURE — 1159F MED LIST DOCD IN RCRD: CPT | Mod: CPTII,95,, | Performed by: INTERNAL MEDICINE

## 2024-04-19 RX ORDER — VITAMIN/MINERAL SUPPLEMENT WITH MAGNESIUM CARBONATE, CALCIUM CARBONATE AND FOLIC ACID 115; 80; 1 MG/1; MG/1; MG/1
1 TABLET, FILM COATED ORAL
Qty: 90 TABLET | Refills: 5 | Status: SHIPPED | OUTPATIENT
Start: 2024-04-19

## 2024-04-19 NOTE — PROGRESS NOTES
Subjective:       Patient ID: Jacqui Leonard is a 58 y.o. White female who presents for return patient evaluation for chronic renal failure.    The patient location is:  Patient Home   The chief complaint leading to consultation is: ckd  Visit type: Virtual visit with synchronous audio and video  Total time spent with patient: 15 minutes  Each patient to whom he or she provides medical services by telemedicine is:  (1) informed of the relationship between the physician and patient and the respective role of any other health care provider with respect to management of the patient; and (2) notified that he or she may decline to receive medical services by telemedicine and may withdraw from such care at any time.     Her blood pressure at home has been 120/60-70.  There have been no recent hospitalizations or procedures.  She had a renal biopsy in 2006.  She had COVID in June 2022.  She thinks her donor eval will be complete soon.      Review of Systems   Constitutional:  Positive for fatigue. Negative for appetite change, chills and fever.   HENT:  Negative for congestion.    Eyes:  Negative for visual disturbance.   Respiratory:  Positive for shortness of breath (with exertion). Negative for cough.    Cardiovascular:  Negative for chest pain and leg swelling.   Gastrointestinal:  Positive for constipation. Negative for nausea.   Genitourinary:  Negative for difficulty urinating, dysuria and hematuria.   Musculoskeletal:  Positive for gait problem and neck pain. Negative for arthralgias and back pain.   Skin:  Negative for rash.   Neurological:  Positive for headaches (occasional).   Psychiatric/Behavioral:  Positive for decreased concentration (brain fog) and sleep disturbance. The patient is nervous/anxious.        The past medical, family and social histories were reviewed for this encounter     There were no vitals taken for this visit.    Objective:      Physical Exam  Vitals reviewed.   Constitutional:        General: She is not in acute distress.     Appearance: She is well-developed.   HENT:      Head: Normocephalic and atraumatic.   Eyes:      General: No scleral icterus.  Pulmonary:      Effort: Pulmonary effort is normal.   Neurological:      Mental Status: She is alert and oriented to person, place, and time.   Psychiatric:         Mood and Affect: Mood normal.         Behavior: Behavior normal.        Assessment:       1. CKD (chronic kidney disease) stage 5, GFR less than 15 ml/min    2. Persistent proteinuria    3. Acquired cyst of kidney    4. Secondary renal hyperparathyroidism    5. Severe obesity    6. FSGS (focal segmental glomerulosclerosis)        Lab Results   Component Value Date    CREATININE 4.3 (H) 04/03/2024    BUN 66 (H) 04/03/2024     04/03/2024    K 4.7 04/03/2024     04/03/2024    CO2 24 04/03/2024     Lab Results   Component Value Date    .5 (H) 02/07/2024    CALCIUM 10.6 (H) 04/03/2024    PHOS 6.0 (H) 04/03/2024     Lab Results   Component Value Date    HCT 32.8 (L) 02/07/2024     Prot/Creat Ratio, Urine   Date Value Ref Range Status   03/30/2023 3.02 (H) 0.00 - 0.20 Final   08/02/2022 5.38 (H) 0.00 - 0.20 Final   04/06/2022 1.92 (H) 0.00 - 0.20 Final     Plan:   Return to clinic in 4-6 weeks.  Labs for this week includes rp at the [place] location on weekday or [facility] on weekend ([facility]).     Baseline creatinine is 1.2-1.5 prior to 2016.  She has been 1.8-2.4 since 2020.  PTH is 182 with a calcium of 9.8.  Continue calcitriol 0.5 mcg daily.  I will start a binder for her.  UPC is 3.0.  She used to be on an ACE I but is not on one currently due to low blood pressure.  Re-biopsy was not done due to increased bleeding risk.  Renal US shows R 10.5 cm L 10.7 cm.  Kidney Smart referral was been ordered/completed.  We discussed making lifestyle changes and using a Mediterranean diet for health benefits and weight loss.  She has been referred to Transplant and is listed.  She  will do PD when she needs it.  I have asked the PD nurse to contact her.  Plan to admit to [facility] for her to start PD when she gets a PD catheter.

## 2024-04-30 LAB — HPRA INTERPRETATION: NORMAL

## 2024-05-07 ENCOUNTER — PATIENT MESSAGE (OUTPATIENT)
Dept: TRANSPLANT | Facility: CLINIC | Age: 59
End: 2024-05-07
Payer: COMMERCIAL

## 2024-05-07 DIAGNOSIS — Z76.82 PRE-KIDNEY TRANSPLANT, LISTED: Primary | ICD-10-CM

## 2024-05-08 ENCOUNTER — PATIENT MESSAGE (OUTPATIENT)
Dept: NEPHROLOGY | Facility: CLINIC | Age: 59
End: 2024-05-08
Payer: COMMERCIAL

## 2024-05-08 ENCOUNTER — LAB VISIT (OUTPATIENT)
Dept: LAB | Facility: HOSPITAL | Age: 59
End: 2024-05-08
Payer: COMMERCIAL

## 2024-05-08 DIAGNOSIS — N18.5 CKD (CHRONIC KIDNEY DISEASE) STAGE 5, GFR LESS THAN 15 ML/MIN: ICD-10-CM

## 2024-05-08 DIAGNOSIS — Z76.82 PRE-KIDNEY TRANSPLANT, LISTED: ICD-10-CM

## 2024-05-08 LAB
ALBUMIN SERPL BCP-MCNC: 3.4 G/DL (ref 3.5–5.2)
ANION GAP SERPL CALC-SCNC: 10 MMOL/L (ref 8–16)
BUN SERPL-MCNC: 71 MG/DL (ref 6–20)
CALCIUM SERPL-MCNC: 10.7 MG/DL (ref 8.7–10.5)
CHLORIDE SERPL-SCNC: 110 MMOL/L (ref 95–110)
CO2 SERPL-SCNC: 20 MMOL/L (ref 23–29)
CREAT SERPL-MCNC: 5 MG/DL (ref 0.5–1.4)
EST. GFR  (NO RACE VARIABLE): 9.4 ML/MIN/1.73 M^2
GLUCOSE SERPL-MCNC: 86 MG/DL (ref 70–110)
MAGNESIUM SERPL-MCNC: 2.1 MG/DL (ref 1.6–2.6)
PHOSPHATE SERPL-MCNC: 6 MG/DL (ref 2.7–4.5)
POTASSIUM SERPL-SCNC: 4.4 MMOL/L (ref 3.5–5.1)
SODIUM SERPL-SCNC: 140 MMOL/L (ref 136–145)

## 2024-05-08 PROCEDURE — 83735 ASSAY OF MAGNESIUM: CPT | Mod: NTX | Performed by: INTERNAL MEDICINE

## 2024-05-08 PROCEDURE — 80069 RENAL FUNCTION PANEL: CPT | Mod: NTX | Performed by: INTERNAL MEDICINE

## 2024-05-08 PROCEDURE — 36415 COLL VENOUS BLD VENIPUNCTURE: CPT | Mod: PO,TXP | Performed by: INTERNAL MEDICINE

## 2024-05-08 PROCEDURE — 99001 SPECIMEN HANDLING PT-LAB: CPT | Mod: TXP | Performed by: NURSE PRACTITIONER

## 2024-05-22 ENCOUNTER — OFFICE VISIT (OUTPATIENT)
Dept: NEPHROLOGY | Facility: CLINIC | Age: 59
End: 2024-05-22
Payer: COMMERCIAL

## 2024-05-22 DIAGNOSIS — N25.81 SECONDARY RENAL HYPERPARATHYROIDISM: ICD-10-CM

## 2024-05-22 DIAGNOSIS — R80.1 PERSISTENT PROTEINURIA: ICD-10-CM

## 2024-05-22 DIAGNOSIS — N28.1 ACQUIRED CYST OF KIDNEY: ICD-10-CM

## 2024-05-22 DIAGNOSIS — N18.5 CKD (CHRONIC KIDNEY DISEASE) STAGE 5, GFR LESS THAN 15 ML/MIN: Primary | ICD-10-CM

## 2024-05-22 DIAGNOSIS — E66.01 SEVERE OBESITY: ICD-10-CM

## 2024-05-22 DIAGNOSIS — N05.1 FSGS (FOCAL SEGMENTAL GLOMERULOSCLEROSIS): ICD-10-CM

## 2024-05-22 PROCEDURE — 1159F MED LIST DOCD IN RCRD: CPT | Mod: CPTII,95,, | Performed by: INTERNAL MEDICINE

## 2024-05-22 PROCEDURE — 99214 OFFICE O/P EST MOD 30 MIN: CPT | Mod: 95,,, | Performed by: INTERNAL MEDICINE

## 2024-05-22 PROCEDURE — 3066F NEPHROPATHY DOC TX: CPT | Mod: CPTII,95,, | Performed by: INTERNAL MEDICINE

## 2024-05-22 PROCEDURE — 1160F RVW MEDS BY RX/DR IN RCRD: CPT | Mod: CPTII,95,, | Performed by: INTERNAL MEDICINE

## 2024-05-22 NOTE — PROGRESS NOTES
Subjective:       Patient ID: Jacqui Leonard is a 58 y.o. White female who presents for return patient evaluation for chronic renal failure.    The patient location is:  Patient Home   The chief complaint leading to consultation is: ckd  Visit type: Virtual visit with synchronous audio and video  Total time spent with patient: 14 minutes  Each patient to whom he or she provides medical services by telemedicine is:  (1) informed of the relationship between the physician and patient and the respective role of any other health care provider with respect to management of the patient; and (2) notified that he or she may decline to receive medical services by telemedicine and may withdraw from such care at any time.     Her blood pressure at home has been 120/60-70.  There have been no recent hospitalizations or procedures.  She had a renal biopsy in 2006.  She had COVID in June 2022.  She thinks her donor eval is still in progress.      Review of Systems   Constitutional:  Positive for fatigue. Negative for appetite change, chills and fever.   HENT:  Negative for congestion.    Eyes:  Negative for visual disturbance.   Respiratory:  Positive for shortness of breath (with exertion). Negative for cough.    Cardiovascular:  Negative for chest pain and leg swelling.   Gastrointestinal:  Positive for constipation. Negative for nausea.   Genitourinary:  Negative for difficulty urinating, dysuria and hematuria.   Musculoskeletal:  Positive for gait problem and neck pain. Negative for arthralgias and back pain.   Skin:  Negative for rash.   Neurological:  Positive for headaches (occasional).   Psychiatric/Behavioral:  Positive for decreased concentration (brain fog) and sleep disturbance. The patient is nervous/anxious.        The past medical, family and social histories were reviewed for this encounter     There were no vitals taken for this visit.    Objective:      Physical Exam  Vitals reviewed.   Constitutional:        General: She is not in acute distress.     Appearance: She is well-developed.   HENT:      Head: Normocephalic and atraumatic.   Eyes:      General: No scleral icterus.  Pulmonary:      Effort: Pulmonary effort is normal.   Neurological:      Mental Status: She is alert and oriented to person, place, and time.   Psychiatric:         Mood and Affect: Mood normal.         Behavior: Behavior normal.        Assessment:       1. CKD (chronic kidney disease) stage 5, GFR less than 15 ml/min    2. Persistent proteinuria    3. Acquired cyst of kidney    4. Secondary renal hyperparathyroidism    5. Severe obesity    6. FSGS (focal segmental glomerulosclerosis)        Lab Results   Component Value Date    CREATININE 5.0 (H) 05/08/2024    BUN 71 (H) 05/08/2024     05/08/2024    K 4.4 05/08/2024     05/08/2024    CO2 20 (L) 05/08/2024     Lab Results   Component Value Date    .5 (H) 02/07/2024    CALCIUM 10.7 (H) 05/08/2024    PHOS 6.0 (H) 05/08/2024     Lab Results   Component Value Date    HCT 32.8 (L) 02/07/2024     Prot/Creat Ratio, Urine   Date Value Ref Range Status   03/30/2023 3.02 (H) 0.00 - 0.20 Final   08/02/2022 5.38 (H) 0.00 - 0.20 Final   04/06/2022 1.92 (H) 0.00 - 0.20 Final     Plan:   Return to clinic in 4-6 weeks.  Labs for this week includes rp at the [place] location on weekday or [place] on weekend ([facility]).     Baseline creatinine is 1.2-1.5 prior to 2016.  She has been 1.8-2.4 since 2020.  Since then she has been progressing.  PTH is 182 with a calcium of 10.7.  Continue calcitriol 0.5 mcg daily.  She is has started a binder yet but I asked her to do so.  UPC is 3.0.  She used to be on an ACE I but is not on one currently due to low blood pressure.  Re-biopsy was not done due to increased bleeding risk.  Renal US shows R 10.5 cm L 10.7 cm.  Kidney Smart referral was been ordered/completed.  We discussed making lifestyle changes and using a Mediterranean diet for health benefits and  weight loss.  She has been referred to Transplant and is listed.  She will do PD when she needs it.  I have asked the PD nurse to contact her.  Plan to admit to [facility] for her to start PD when she gets a PD catheter.

## 2024-05-31 ENCOUNTER — PATIENT MESSAGE (OUTPATIENT)
Dept: NEPHROLOGY | Facility: CLINIC | Age: 59
End: 2024-05-31
Payer: COMMERCIAL

## 2024-06-04 ENCOUNTER — PATIENT MESSAGE (OUTPATIENT)
Dept: TRANSPLANT | Facility: CLINIC | Age: 59
End: 2024-06-04
Payer: COMMERCIAL

## 2024-06-04 ENCOUNTER — TELEPHONE (OUTPATIENT)
Dept: TRANSPLANT | Facility: CLINIC | Age: 59
End: 2024-06-04
Payer: COMMERCIAL

## 2024-06-04 NOTE — TELEPHONE ENCOUNTER
I was not able to reach pt on the phone to reschedule appt. I sent a message in portal, informing pt of rescheduled lab appt to 06/05/2024 at 8:15am ([place]).     ----- Message from Myla Sidhu RN sent at 6/4/2024  7:15 AM CDT -----  Regarding: FW: change location on lab 6/5 to [place] location  Contact: PT  [phone]    ----- Message -----  From: Sally Cotter  Sent: 6/3/2024   4:09 PM CDT  To: Kidney Waitlisted Coordinator  Subject: change location on lab 6/5 to [place] locati#    The patient called requesting to r/s labs to a different location just ones for [facility] sched 6/5  Please sched that lab date only at [place] location on [place]     No further information provided    Patient can be contacted @# [phone]

## 2024-06-04 NOTE — TELEPHONE ENCOUNTER
returned patient's call. Patient requested information regarding opening a bank account for fundraising efforts.  discussed with patient organizations that assist with fundraising efforts then emailed education packet to patient for reference.  remains available.

## 2024-06-05 ENCOUNTER — LAB VISIT (OUTPATIENT)
Dept: LAB | Facility: HOSPITAL | Age: 59
End: 2024-06-05
Attending: NURSE PRACTITIONER
Payer: COMMERCIAL

## 2024-06-05 DIAGNOSIS — Z76.82 PRE-KIDNEY TRANSPLANT, LISTED: ICD-10-CM

## 2024-06-05 DIAGNOSIS — N18.5 CKD (CHRONIC KIDNEY DISEASE) STAGE 5, GFR LESS THAN 15 ML/MIN: ICD-10-CM

## 2024-06-05 LAB
ALBUMIN SERPL BCP-MCNC: 3.6 G/DL (ref 3.5–5.2)
ANION GAP SERPL CALC-SCNC: 8 MMOL/L (ref 8–16)
BUN SERPL-MCNC: 64 MG/DL (ref 6–20)
CALCIUM SERPL-MCNC: 10.8 MG/DL (ref 8.7–10.5)
CHLORIDE SERPL-SCNC: 110 MMOL/L (ref 95–110)
CO2 SERPL-SCNC: 23 MMOL/L (ref 23–29)
CREAT SERPL-MCNC: 4.6 MG/DL (ref 0.5–1.4)
EST. GFR  (NO RACE VARIABLE): 10.4 ML/MIN/1.73 M^2
GLUCOSE SERPL-MCNC: 82 MG/DL (ref 70–110)
PHOSPHATE SERPL-MCNC: 4.5 MG/DL (ref 2.7–4.5)
POTASSIUM SERPL-SCNC: 4.3 MMOL/L (ref 3.5–5.1)
SODIUM SERPL-SCNC: 141 MMOL/L (ref 136–145)

## 2024-06-05 PROCEDURE — 86832 HLA CLASS I HIGH DEFIN QUAL: CPT | Mod: TXP | Performed by: NURSE PRACTITIONER

## 2024-06-05 PROCEDURE — 80069 RENAL FUNCTION PANEL: CPT | Mod: NTX | Performed by: INTERNAL MEDICINE

## 2024-06-05 PROCEDURE — 36415 COLL VENOUS BLD VENIPUNCTURE: CPT | Mod: PO,NTX | Performed by: INTERNAL MEDICINE

## 2024-06-05 PROCEDURE — 86833 HLA CLASS II HIGH DEFIN QUAL: CPT | Mod: TXP | Performed by: NURSE PRACTITIONER

## 2024-06-11 LAB — HPRA INTERPRETATION: NORMAL

## 2024-06-21 ENCOUNTER — PATIENT MESSAGE (OUTPATIENT)
Dept: NEPHROLOGY | Facility: CLINIC | Age: 59
End: 2024-06-21

## 2024-06-25 ENCOUNTER — TELEPHONE (OUTPATIENT)
Dept: TRANSPLANT | Facility: CLINIC | Age: 59
End: 2024-06-25
Payer: COMMERCIAL

## 2024-06-25 NOTE — TELEPHONE ENCOUNTER
Spoke with recipient, let her know that her donor was declined for several reasons, but couldn't disclose much more.  All questions answered.

## 2024-07-02 ENCOUNTER — PATIENT MESSAGE (OUTPATIENT)
Dept: TRANSPLANT | Facility: CLINIC | Age: 59
End: 2024-07-02
Payer: COMMERCIAL

## 2024-07-03 ENCOUNTER — LAB VISIT (OUTPATIENT)
Dept: LAB | Facility: HOSPITAL | Age: 59
End: 2024-07-03
Payer: COMMERCIAL

## 2024-07-03 DIAGNOSIS — Z76.82 PRE-KIDNEY TRANSPLANT, LISTED: ICD-10-CM

## 2024-07-03 DIAGNOSIS — N18.5 CKD (CHRONIC KIDNEY DISEASE) STAGE 5, GFR LESS THAN 15 ML/MIN: ICD-10-CM

## 2024-07-03 LAB
ALBUMIN SERPL BCP-MCNC: 3.4 G/DL (ref 3.5–5.2)
ANION GAP SERPL CALC-SCNC: 11 MMOL/L (ref 8–16)
BUN SERPL-MCNC: 74 MG/DL (ref 6–20)
CALCIUM SERPL-MCNC: 10.7 MG/DL (ref 8.7–10.5)
CHLORIDE SERPL-SCNC: 110 MMOL/L (ref 95–110)
CO2 SERPL-SCNC: 19 MMOL/L (ref 23–29)
CREAT SERPL-MCNC: 4.9 MG/DL (ref 0.5–1.4)
EST. GFR  (NO RACE VARIABLE): 9.6 ML/MIN/1.73 M^2
GLUCOSE SERPL-MCNC: 76 MG/DL (ref 70–110)
PHOSPHATE SERPL-MCNC: 5.5 MG/DL (ref 2.7–4.5)
POTASSIUM SERPL-SCNC: 4.2 MMOL/L (ref 3.5–5.1)
SODIUM SERPL-SCNC: 140 MMOL/L (ref 136–145)

## 2024-07-03 PROCEDURE — 80069 RENAL FUNCTION PANEL: CPT | Mod: TXP | Performed by: INTERNAL MEDICINE

## 2024-07-03 PROCEDURE — 36415 COLL VENOUS BLD VENIPUNCTURE: CPT | Mod: PO,TXP | Performed by: INTERNAL MEDICINE

## 2024-07-19 ENCOUNTER — OFFICE VISIT (OUTPATIENT)
Dept: NEPHROLOGY | Facility: CLINIC | Age: 59
End: 2024-07-19
Payer: COMMERCIAL

## 2024-07-19 DIAGNOSIS — N18.5 CKD (CHRONIC KIDNEY DISEASE) STAGE 5, GFR LESS THAN 15 ML/MIN: Primary | ICD-10-CM

## 2024-07-19 DIAGNOSIS — N05.1 FSGS (FOCAL SEGMENTAL GLOMERULOSCLEROSIS): ICD-10-CM

## 2024-07-19 DIAGNOSIS — E66.01 SEVERE OBESITY: ICD-10-CM

## 2024-07-19 DIAGNOSIS — N25.81 SECONDARY RENAL HYPERPARATHYROIDISM: ICD-10-CM

## 2024-07-19 DIAGNOSIS — N28.1 ACQUIRED CYST OF KIDNEY: ICD-10-CM

## 2024-07-19 DIAGNOSIS — R80.1 PERSISTENT PROTEINURIA: ICD-10-CM

## 2024-07-19 PROCEDURE — 99214 OFFICE O/P EST MOD 30 MIN: CPT | Mod: 95,,, | Performed by: INTERNAL MEDICINE

## 2024-07-19 PROCEDURE — 1160F RVW MEDS BY RX/DR IN RCRD: CPT | Mod: CPTII,95,, | Performed by: INTERNAL MEDICINE

## 2024-07-19 PROCEDURE — 1159F MED LIST DOCD IN RCRD: CPT | Mod: CPTII,95,, | Performed by: INTERNAL MEDICINE

## 2024-07-19 PROCEDURE — 3066F NEPHROPATHY DOC TX: CPT | Mod: CPTII,95,, | Performed by: INTERNAL MEDICINE

## 2024-07-19 NOTE — PROGRESS NOTES
Subjective:       Patient ID: Jacqui Leonard is a 58 y.o. White female who presents for return patient evaluation for chronic renal failure.    The patient location is:  Patient Home   The chief complaint leading to consultation is: ckd  Visit type: Virtual visit with synchronous audio and video  Total time spent with patient: 13 minutes  Each patient to whom he or she provides medical services by telemedicine is:  (1) informed of the relationship between the physician and patient and the respective role of any other health care provider with respect to management of the patient; and (2) notified that he or she may decline to receive medical services by telemedicine and may withdraw from such care at any time.     Her blood pressure at home has been 120/60-70.  There have been no recent hospitalizations or procedures.  She had a renal biopsy in 2006.  She had COVID in June 2022.  She took bactrim about 2 weeks ago for a UTI.      Review of Systems   Constitutional:  Positive for fatigue. Negative for appetite change, chills and fever.   HENT:  Negative for congestion.    Eyes:  Negative for visual disturbance.   Respiratory:  Positive for shortness of breath (with exertion). Negative for cough.    Cardiovascular:  Negative for chest pain and leg swelling.   Gastrointestinal:  Positive for constipation. Negative for nausea.   Genitourinary:  Negative for difficulty urinating, dysuria and hematuria.   Musculoskeletal:  Positive for gait problem and neck pain. Negative for arthralgias and back pain.   Skin:  Negative for rash.   Neurological:  Positive for headaches (occasional).   Psychiatric/Behavioral:  Positive for decreased concentration (brain fog) and sleep disturbance. The patient is nervous/anxious.        The past medical, family and social histories were reviewed for this encounter     There were no vitals taken for this visit.    Objective:      Physical Exam  Vitals reviewed.   Constitutional:        General: She is not in acute distress.     Appearance: She is well-developed.   HENT:      Head: Normocephalic and atraumatic.   Eyes:      General: No scleral icterus.  Pulmonary:      Effort: Pulmonary effort is normal.   Neurological:      Mental Status: She is alert and oriented to person, place, and time.   Psychiatric:         Mood and Affect: Mood normal.         Behavior: Behavior normal.        Assessment:       1. CKD (chronic kidney disease) stage 5, GFR less than 15 ml/min    2. Persistent proteinuria    3. Acquired cyst of kidney    4. Secondary renal hyperparathyroidism    5. Severe obesity    6. FSGS (focal segmental glomerulosclerosis)        Lab Results   Component Value Date    CREATININE 4.9 (H) 07/03/2024    BUN 74 (H) 07/03/2024     07/03/2024    K 4.2 07/03/2024     07/03/2024    CO2 19 (L) 07/03/2024     Lab Results   Component Value Date    .5 (H) 02/07/2024    CALCIUM 10.7 (H) 07/03/2024    PHOS 5.5 (H) 07/03/2024     Lab Results   Component Value Date    HCT 32.8 (L) 02/07/2024     Prot/Creat Ratio, Urine   Date Value Ref Range Status   03/30/2023 3.02 (H) 0.00 - 0.20 Final   08/02/2022 5.38 (H) 0.00 - 0.20 Final   04/06/2022 1.92 (H) 0.00 - 0.20 Final     Plan:   Return to clinic in 4 weeks.  Labs for this week includes rp cbc pth iron studies at the [place] location on weekday or [place] on weekend ([facility]).  Labs on 8/7.     Baseline creatinine is 1.2-1.5 prior to 2016.  She has been 1.8-2.4 since 2020.  Since then she has been progressing.  PTH is 182 with a calcium of 10.7.  Continue calcitriol 0.5 mcg daily.  She is has started a binder yet but I asked her to do so.  UPC is 3.0.  She used to be on an ACE I but is not on one currently due to low blood pressure.  Re-biopsy was not done due to increased bleeding risk.  Renal US shows R 10.5 cm L 10.7 cm.  Kidney Smart referral was been ordered/completed.  We discussed making lifestyle changes and using a  Mediterranean diet for health benefits and weight loss.  She has been referred to Transplant and is listed.  She will do PD when she needs it.  I have asked the PD nurse to contact her.  Plan to admit to [facility] for her to start PD when she gets a PD catheter.  We are going to go another month since her labs are acceptable and she has no new symptoms.

## 2024-08-07 ENCOUNTER — LAB VISIT (OUTPATIENT)
Dept: LAB | Facility: HOSPITAL | Age: 59
End: 2024-08-07
Attending: NURSE PRACTITIONER
Payer: COMMERCIAL

## 2024-08-07 DIAGNOSIS — N18.5 CKD (CHRONIC KIDNEY DISEASE) STAGE 5, GFR LESS THAN 15 ML/MIN: ICD-10-CM

## 2024-08-07 DIAGNOSIS — Z76.82 PRE-KIDNEY TRANSPLANT, LISTED: ICD-10-CM

## 2024-08-07 LAB
ALBUMIN SERPL BCP-MCNC: 3.5 G/DL (ref 3.5–5.2)
ANION GAP SERPL CALC-SCNC: 10 MMOL/L (ref 8–16)
BASOPHILS # BLD AUTO: 0.03 K/UL (ref 0–0.2)
BASOPHILS NFR BLD: 0.7 % (ref 0–1.9)
BUN SERPL-MCNC: 60 MG/DL (ref 6–20)
CALCIUM SERPL-MCNC: 10.6 MG/DL (ref 8.7–10.5)
CHLORIDE SERPL-SCNC: 108 MMOL/L (ref 95–110)
CO2 SERPL-SCNC: 22 MMOL/L (ref 23–29)
CREAT SERPL-MCNC: 5.1 MG/DL (ref 0.5–1.4)
DIFFERENTIAL METHOD BLD: ABNORMAL
EOSINOPHIL # BLD AUTO: 0.2 K/UL (ref 0–0.5)
EOSINOPHIL NFR BLD: 4.5 % (ref 0–8)
ERYTHROCYTE [DISTWIDTH] IN BLOOD BY AUTOMATED COUNT: 14.1 % (ref 11.5–14.5)
EST. GFR  (NO RACE VARIABLE): 9.2 ML/MIN/1.73 M^2
FERRITIN SERPL-MCNC: 59 NG/ML (ref 20–300)
GLUCOSE SERPL-MCNC: 83 MG/DL (ref 70–110)
HCT VFR BLD AUTO: 34.9 % (ref 37–48.5)
HGB BLD-MCNC: 10.3 G/DL (ref 12–16)
IMM GRANULOCYTES # BLD AUTO: 0.01 K/UL (ref 0–0.04)
IMM GRANULOCYTES NFR BLD AUTO: 0.2 % (ref 0–0.5)
IRON SERPL-MCNC: 98 UG/DL (ref 30–160)
LYMPHOCYTES # BLD AUTO: 1 K/UL (ref 1–4.8)
LYMPHOCYTES NFR BLD: 23.4 % (ref 18–48)
MCH RBC QN AUTO: 28.7 PG (ref 27–31)
MCHC RBC AUTO-ENTMCNC: 29.5 G/DL (ref 32–36)
MCV RBC AUTO: 97 FL (ref 82–98)
MONOCYTES # BLD AUTO: 0.4 K/UL (ref 0.3–1)
MONOCYTES NFR BLD: 9.2 % (ref 4–15)
NEUTROPHILS # BLD AUTO: 2.8 K/UL (ref 1.8–7.7)
NEUTROPHILS NFR BLD: 62 % (ref 38–73)
NRBC BLD-RTO: 0 /100 WBC
PHOSPHATE SERPL-MCNC: 4.9 MG/DL (ref 2.7–4.5)
PLATELET # BLD AUTO: 237 K/UL (ref 150–450)
PMV BLD AUTO: 10.3 FL (ref 9.2–12.9)
POTASSIUM SERPL-SCNC: 4.6 MMOL/L (ref 3.5–5.1)
PTH-INTACT SERPL-MCNC: 103.8 PG/ML (ref 9–77)
RBC # BLD AUTO: 3.59 M/UL (ref 4–5.4)
SATURATED IRON: 30 % (ref 20–50)
SODIUM SERPL-SCNC: 140 MMOL/L (ref 136–145)
TOTAL IRON BINDING CAPACITY: 327 UG/DL (ref 250–450)
TRANSFERRIN SERPL-MCNC: 221 MG/DL (ref 200–375)
WBC # BLD AUTO: 4.44 K/UL (ref 3.9–12.7)

## 2024-08-07 PROCEDURE — 36415 COLL VENOUS BLD VENIPUNCTURE: CPT | Mod: PO,TXP | Performed by: INTERNAL MEDICINE

## 2024-08-07 PROCEDURE — 85025 COMPLETE CBC W/AUTO DIFF WBC: CPT | Mod: TXP | Performed by: INTERNAL MEDICINE

## 2024-08-07 PROCEDURE — 83540 ASSAY OF IRON: CPT | Mod: TXP | Performed by: INTERNAL MEDICINE

## 2024-08-07 PROCEDURE — 82728 ASSAY OF FERRITIN: CPT | Mod: TXP | Performed by: INTERNAL MEDICINE

## 2024-08-07 PROCEDURE — 80069 RENAL FUNCTION PANEL: CPT | Mod: TXP | Performed by: INTERNAL MEDICINE

## 2024-08-07 PROCEDURE — 83970 ASSAY OF PARATHORMONE: CPT | Mod: TXP | Performed by: INTERNAL MEDICINE

## 2024-08-12 RX ORDER — CALCITRIOL 0.5 UG/1
0.5 CAPSULE ORAL DAILY
Qty: 30 CAPSULE | Refills: 5 | Status: SHIPPED | OUTPATIENT
Start: 2024-08-12

## 2024-08-26 ENCOUNTER — OFFICE VISIT (OUTPATIENT)
Dept: NEPHROLOGY | Facility: CLINIC | Age: 59
End: 2024-08-26
Payer: COMMERCIAL

## 2024-08-26 ENCOUNTER — TELEPHONE (OUTPATIENT)
Dept: NEPHROLOGY | Facility: CLINIC | Age: 59
End: 2024-08-26

## 2024-08-26 DIAGNOSIS — N05.1 FSGS (FOCAL SEGMENTAL GLOMERULOSCLEROSIS): ICD-10-CM

## 2024-08-26 DIAGNOSIS — E66.01 SEVERE OBESITY: ICD-10-CM

## 2024-08-26 DIAGNOSIS — R80.1 PERSISTENT PROTEINURIA: ICD-10-CM

## 2024-08-26 DIAGNOSIS — N25.81 SECONDARY RENAL HYPERPARATHYROIDISM: ICD-10-CM

## 2024-08-26 DIAGNOSIS — N18.5 CKD (CHRONIC KIDNEY DISEASE) STAGE 5, GFR LESS THAN 15 ML/MIN: Primary | ICD-10-CM

## 2024-08-26 DIAGNOSIS — N28.1 ACQUIRED CYST OF KIDNEY: ICD-10-CM

## 2024-08-26 PROCEDURE — 3066F NEPHROPATHY DOC TX: CPT | Mod: CPTII,95,, | Performed by: INTERNAL MEDICINE

## 2024-08-26 PROCEDURE — 1160F RVW MEDS BY RX/DR IN RCRD: CPT | Mod: CPTII,95,, | Performed by: INTERNAL MEDICINE

## 2024-08-26 PROCEDURE — 1159F MED LIST DOCD IN RCRD: CPT | Mod: CPTII,95,, | Performed by: INTERNAL MEDICINE

## 2024-08-26 PROCEDURE — 99214 OFFICE O/P EST MOD 30 MIN: CPT | Mod: 95,,, | Performed by: INTERNAL MEDICINE

## 2024-08-26 NOTE — TELEPHONE ENCOUNTER
Return to clinic in 4 weeks - make her appointments on the second week of the month (or third week) - so I will see her in October.

## 2024-08-26 NOTE — PROGRESS NOTES
"  Subjective:       Patient ID: Jacqui Leonard is a 58 y.o. White female who presents for return patient evaluation for chronic renal failure.    The patient location is:  Patient Home   The chief complaint leading to consultation is: ckd  Visit type: Virtual visit with synchronous audio and video  Total time spent with patient: 11 minutes  Each patient to whom he or she provides medical services by telemedicine is:  (1) informed of the relationship between the physician and patient and the respective role of any other health care provider with respect to management of the patient; and (2) notified that he or she may decline to receive medical services by telemedicine and may withdraw from such care at any time.     Her blood pressure at home has been 120/60-70.  There have been no recent hospitalizations or procedures.  She had a renal biopsy in 2006.  She had COVID in June 2022.  Her donor was rejected.  She has not been sleeping well lately.  Her appetite is "too good".      Review of Systems   Constitutional:  Positive for fatigue. Negative for appetite change, chills and fever.   HENT:  Negative for congestion.    Eyes:  Negative for visual disturbance.   Respiratory:  Positive for shortness of breath (with exertion). Negative for cough.    Cardiovascular:  Negative for chest pain and leg swelling.   Gastrointestinal:  Positive for constipation. Negative for nausea.   Genitourinary:  Negative for difficulty urinating, dysuria and hematuria.   Musculoskeletal:  Positive for gait problem and neck pain. Negative for arthralgias and back pain.   Skin:  Negative for rash.   Neurological:  Positive for headaches (occasional).   Psychiatric/Behavioral:  Positive for decreased concentration (brain fog) and sleep disturbance. The patient is nervous/anxious.        The past medical, family and social histories were reviewed for this encounter     There were no vitals taken for this visit.    Objective:    "   Physical Exam  Vitals reviewed.   Constitutional:       General: She is not in acute distress.     Appearance: She is well-developed.   HENT:      Head: Normocephalic and atraumatic.   Eyes:      General: No scleral icterus.  Pulmonary:      Effort: Pulmonary effort is normal.   Neurological:      Mental Status: She is alert and oriented to person, place, and time.   Psychiatric:         Mood and Affect: Mood normal.         Behavior: Behavior normal.        Assessment:       1. CKD (chronic kidney disease) stage 5, GFR less than 15 ml/min    2. Persistent proteinuria    3. Acquired cyst of kidney    4. Secondary renal hyperparathyroidism    5. Severe obesity    6. FSGS (focal segmental glomerulosclerosis)        Lab Results   Component Value Date    CREATININE 5.1 (H) 08/07/2024    BUN 60 (H) 08/07/2024     08/07/2024    K 4.6 08/07/2024     08/07/2024    CO2 22 (L) 08/07/2024     Lab Results   Component Value Date    .8 (H) 08/07/2024    CALCIUM 10.6 (H) 08/07/2024    PHOS 4.9 (H) 08/07/2024     Lab Results   Component Value Date    HCT 34.9 (L) 08/07/2024     Prot/Creat Ratio, Urine   Date Value Ref Range Status   03/30/2023 3.02 (H) 0.00 - 0.20 Final   08/02/2022 5.38 (H) 0.00 - 0.20 Final   04/06/2022 1.92 (H) 0.00 - 0.20 Final     Plan:   Return to clinic in 4 weeks - make her appointments on the second week of the month (or third week) - so I will see her in October.  Labs for this week includes rp at the [place] location on weekday or [place] on weekend ([facility]).   Baseline creatinine is 1.2-1.5 prior to 2016.  She has been 1.8-2.4 since 2020.  Since then she has been progressing.  PTH is 104 with a calcium of 10.6.  Continue calcitriol 0.5 mcg daily.  Continue binder.  UPC is 3.0.  She used to be on an ACE I but is not on one currently due to low blood pressure.  Re-biopsy was not done due to increased bleeding risk.  Renal US shows R 10.5 cm L 10.7 cm.  Kidney Smart referral was been  ordered/completed.  We discussed making lifestyle changes and using a Mediterranean diet for health benefits and weight loss.  She has been referred to Transplant and is listed.  She will do PD when she needs it.  I have asked the PD nurse to contact her.  Plan to admit to [facility] for her to start PD when she gets a PD catheter.  We are going to go another month since her labs are acceptable and she has no new symptoms.

## 2024-08-29 ENCOUNTER — TELEPHONE (OUTPATIENT)
Dept: TRANSPLANT | Facility: CLINIC | Age: 59
End: 2024-08-29
Payer: COMMERCIAL

## 2024-08-29 DIAGNOSIS — N18.6 END STAGE RENAL DISEASE: ICD-10-CM

## 2024-08-29 DIAGNOSIS — Z76.82 ORGAN TRANSPLANT CANDIDATE: Primary | ICD-10-CM

## 2024-08-29 NOTE — TELEPHONE ENCOUNTER
----- Message from Keagan Veronica Jr., MD sent at 8/29/2024  3:47 PM CDT -----  Regarding: RE: CT review 1/10/24  Its a good study and her vessels are acceptable. thanks  ----- Message -----  From: Myla Sidhu RN  Sent: 8/29/2024  11:03 AM CDT  To: Keagan Veronica Jr., MD  Subject: CT review 1/10/24                                Patient had CT renal stone study without contrast 1/10/24. Are you able to assess her vessels with this study or does she need a new CT scan? Thanks.    Myla

## 2024-09-03 ENCOUNTER — PATIENT MESSAGE (OUTPATIENT)
Dept: TRANSPLANT | Facility: CLINIC | Age: 59
End: 2024-09-03
Payer: COMMERCIAL

## 2024-09-04 ENCOUNTER — LAB VISIT (OUTPATIENT)
Dept: LAB | Facility: HOSPITAL | Age: 59
End: 2024-09-04
Attending: NURSE PRACTITIONER
Payer: COMMERCIAL

## 2024-09-04 DIAGNOSIS — N18.5 CKD (CHRONIC KIDNEY DISEASE) STAGE 5, GFR LESS THAN 15 ML/MIN: ICD-10-CM

## 2024-09-04 DIAGNOSIS — Z76.82 PRE-KIDNEY TRANSPLANT, LISTED: ICD-10-CM

## 2024-09-04 LAB
ALBUMIN SERPL BCP-MCNC: 3.6 G/DL (ref 3.5–5.2)
ANION GAP SERPL CALC-SCNC: 12 MMOL/L (ref 8–16)
BUN SERPL-MCNC: 62 MG/DL (ref 6–20)
CALCIUM SERPL-MCNC: 10.8 MG/DL (ref 8.7–10.5)
CHLORIDE SERPL-SCNC: 107 MMOL/L (ref 95–110)
CO2 SERPL-SCNC: 20 MMOL/L (ref 23–29)
CREAT SERPL-MCNC: 4.8 MG/DL (ref 0.5–1.4)
EST. GFR  (NO RACE VARIABLE): 9.9 ML/MIN/1.73 M^2
GLUCOSE SERPL-MCNC: 83 MG/DL (ref 70–110)
PHOSPHATE SERPL-MCNC: 4.4 MG/DL (ref 2.7–4.5)
POTASSIUM SERPL-SCNC: 4.3 MMOL/L (ref 3.5–5.1)
SODIUM SERPL-SCNC: 139 MMOL/L (ref 136–145)

## 2024-09-04 PROCEDURE — 80069 RENAL FUNCTION PANEL: CPT | Mod: TXP | Performed by: INTERNAL MEDICINE

## 2024-09-04 PROCEDURE — 36415 COLL VENOUS BLD VENIPUNCTURE: CPT | Mod: PO,TXP | Performed by: INTERNAL MEDICINE

## 2024-09-05 ENCOUNTER — TELEPHONE (OUTPATIENT)
Dept: TRANSPLANT | Facility: CLINIC | Age: 59
End: 2024-09-05
Payer: COMMERCIAL

## 2024-09-12 ENCOUNTER — TELEPHONE (OUTPATIENT)
Dept: TRANSPLANT | Facility: CLINIC | Age: 59
End: 2024-09-12
Payer: COMMERCIAL

## 2024-09-12 NOTE — LETTER
2024    Jacqui Leonard  [address] [place] [address]        Dear Jacqui Leonard:  MRN: [id number]  : 1965    You are scheduled on 2024 for follow up in the transplant clinic to update your records and evaluate your health status as you wait for a transplant.  It is very important that we ensure you are ready for your transplant.      Please make arrangements to be in our transplant clinic from 12:30 pm- 4 pm.  During this time you will watch an educational video and then be seen by our transplant , financial counselor, and advance practice provider.     If you have had a change in your medical history since your last visit, please call your transplant coordinator to ensure we have the medical records to review prior to your appointment.     Please bring the following with you to this appointment:  A Caregiver is REQUIRED  Bring ALL insurance information including medication card  Current list of medications  Copies of any outside medical records from the past year, such as: mammogram, pap smear, ultrasound, CAT scan, colonoscopy, cardiac angiogram or cardiac stress test    To reschedule your appointments please call [phone] or [phone] ([phone]). Please ask for the .      Failure to cancel in advance or arrive on time could result in a $50 cancellation fee.  Your transplant candidacy could be affected by no showing these appointments.  We look forward to seeing you.    Sincerely,    [facility]  [address] [place] [id number]  [phone]

## 2024-09-25 ENCOUNTER — PATIENT MESSAGE (OUTPATIENT)
Dept: TRANSPLANT | Facility: CLINIC | Age: 59
End: 2024-09-25
Payer: COMMERCIAL

## 2024-10-07 ENCOUNTER — LAB VISIT (OUTPATIENT)
Dept: LAB | Facility: HOSPITAL | Age: 59
End: 2024-10-07
Attending: NURSE PRACTITIONER
Payer: COMMERCIAL

## 2024-10-07 DIAGNOSIS — Z76.82 PRE-KIDNEY TRANSPLANT, LISTED: ICD-10-CM

## 2024-10-07 DIAGNOSIS — N18.5 CKD (CHRONIC KIDNEY DISEASE) STAGE 5, GFR LESS THAN 15 ML/MIN: ICD-10-CM

## 2024-10-07 LAB
ALBUMIN SERPL BCP-MCNC: 3.4 G/DL (ref 3.5–5.2)
ANION GAP SERPL CALC-SCNC: 8 MMOL/L (ref 8–16)
BUN SERPL-MCNC: 59 MG/DL (ref 6–20)
CALCIUM SERPL-MCNC: 10.2 MG/DL (ref 8.7–10.5)
CHLORIDE SERPL-SCNC: 108 MMOL/L (ref 95–110)
CO2 SERPL-SCNC: 24 MMOL/L (ref 23–29)
CREAT SERPL-MCNC: 4.4 MG/DL (ref 0.5–1.4)
EST. GFR  (NO RACE VARIABLE): 11 ML/MIN/1.73 M^2
GLUCOSE SERPL-MCNC: 89 MG/DL (ref 70–110)
PHOSPHATE SERPL-MCNC: 3.9 MG/DL (ref 2.7–4.5)
POTASSIUM SERPL-SCNC: 4.9 MMOL/L (ref 3.5–5.1)
SODIUM SERPL-SCNC: 140 MMOL/L (ref 136–145)

## 2024-10-07 PROCEDURE — 36415 COLL VENOUS BLD VENIPUNCTURE: CPT | Mod: PO,TXP | Performed by: INTERNAL MEDICINE

## 2024-10-07 PROCEDURE — 80069 RENAL FUNCTION PANEL: CPT | Mod: TXP | Performed by: INTERNAL MEDICINE

## 2024-10-10 ENCOUNTER — PATIENT MESSAGE (OUTPATIENT)
Dept: TRANSPLANT | Facility: CLINIC | Age: 59
End: 2024-10-10
Payer: COMMERCIAL

## 2024-10-10 ENCOUNTER — OFFICE VISIT (OUTPATIENT)
Dept: NEPHROLOGY | Facility: CLINIC | Age: 59
End: 2024-10-10
Payer: COMMERCIAL

## 2024-10-10 DIAGNOSIS — R80.1 PERSISTENT PROTEINURIA: ICD-10-CM

## 2024-10-10 DIAGNOSIS — E66.01 SEVERE OBESITY: ICD-10-CM

## 2024-10-10 DIAGNOSIS — N18.5 CKD (CHRONIC KIDNEY DISEASE) STAGE 5, GFR LESS THAN 15 ML/MIN: Primary | ICD-10-CM

## 2024-10-10 DIAGNOSIS — N28.1 ACQUIRED CYST OF KIDNEY: ICD-10-CM

## 2024-10-10 DIAGNOSIS — N25.81 SECONDARY RENAL HYPERPARATHYROIDISM: ICD-10-CM

## 2024-10-10 DIAGNOSIS — N05.1 FSGS (FOCAL SEGMENTAL GLOMERULOSCLEROSIS): ICD-10-CM

## 2024-10-10 PROCEDURE — 3066F NEPHROPATHY DOC TX: CPT | Mod: CPTII,95,, | Performed by: INTERNAL MEDICINE

## 2024-10-10 PROCEDURE — 1159F MED LIST DOCD IN RCRD: CPT | Mod: CPTII,95,, | Performed by: INTERNAL MEDICINE

## 2024-10-10 PROCEDURE — 99214 OFFICE O/P EST MOD 30 MIN: CPT | Mod: 95,,, | Performed by: INTERNAL MEDICINE

## 2024-10-10 PROCEDURE — 1160F RVW MEDS BY RX/DR IN RCRD: CPT | Mod: CPTII,95,, | Performed by: INTERNAL MEDICINE

## 2024-10-10 NOTE — PROGRESS NOTES
Subjective:       Patient ID: Jacqui Leonard is a 58 y.o. White female who presents for return patient evaluation for chronic renal failure.    The patient location is:  Patient Home   The chief complaint leading to consultation is: ckd  Visit type: Virtual visit with synchronous audio and video  Total time spent with patient: 11 minutes  Each patient to whom he or she provides medical services by telemedicine is:  (1) informed of the relationship between the physician and patient and the respective role of any other health care provider with respect to management of the patient; and (2) notified that he or she may decline to receive medical services by telemedicine and may withdraw from such care at any time.       There have been no recent hospitalizations or procedures.  She had a renal biopsy in 2006.  She had COVID in June 2022.  Her donor was rejected.  She is feeling well currently.      Review of Systems   Constitutional:  Positive for fatigue. Negative for appetite change, chills and fever.   HENT:  Negative for congestion.    Eyes:  Negative for visual disturbance.   Respiratory:  Positive for shortness of breath (with exertion). Negative for cough.    Cardiovascular:  Negative for chest pain and leg swelling.   Gastrointestinal:  Positive for constipation. Negative for nausea.   Genitourinary:  Negative for difficulty urinating, dysuria and hematuria.   Musculoskeletal:  Positive for gait problem and neck pain. Negative for arthralgias and back pain.   Skin:  Negative for rash.   Neurological:  Positive for headaches (occasional).   Psychiatric/Behavioral:  Positive for decreased concentration (brain fog) and sleep disturbance. The patient is nervous/anxious.        The past medical, family and social histories were reviewed for this encounter     There were no vitals taken for this visit.    Objective:      Physical Exam  Vitals reviewed.   Constitutional:       General: She is not in acute  distress.     Appearance: She is well-developed.   HENT:      Head: Normocephalic and atraumatic.   Eyes:      General: No scleral icterus.  Pulmonary:      Effort: Pulmonary effort is normal.   Neurological:      Mental Status: She is alert and oriented to person, place, and time.   Psychiatric:         Mood and Affect: Mood normal.         Behavior: Behavior normal.        Assessment:       1. CKD (chronic kidney disease) stage 5, GFR less than 15 ml/min    2. Persistent proteinuria    3. Acquired cyst of kidney    4. Secondary renal hyperparathyroidism    5. Severe obesity    6. FSGS (focal segmental glomerulosclerosis)        Lab Results   Component Value Date    CREATININE 4.4 (H) 10/07/2024    BUN 59 (H) 10/07/2024     10/07/2024    K 4.9 10/07/2024     10/07/2024    CO2 24 10/07/2024     Lab Results   Component Value Date    .8 (H) 08/07/2024    CALCIUM 10.2 10/07/2024    PHOS 3.9 10/07/2024     Lab Results   Component Value Date    HCT 34.9 (L) 08/07/2024     Prot/Creat Ratio, Urine   Date Value Ref Range Status   03/30/2023 3.02 (H) 0.00 - 0.20 Final   08/02/2022 5.38 (H) 0.00 - 0.20 Final   04/06/2022 1.92 (H) 0.00 - 0.20 Final     Plan:   Return to clinic in 4 weeks - make her appointments on the second week of the month.  Labs for this week includes rp at the [place] location on weekday or [place] on weekend ([facility]).   Baseline creatinine is 1.2-1.5 prior to 2016.  She has been 1.8-2.4 since 2020.  Since then she has been progressing.  PTH is 104 with a calcium of 10.6.  Continue calcitriol 0.5 mcg daily.  Continue binder.  UPC is 3.0.  She used to be on an ACE I but is not on one currently due to low blood pressure.  Re-biopsy was not done due to increased bleeding risk.  Renal US shows R 10.5 cm L 10.7 cm.  Kidney Smart referral was been ordered/completed.  We discussed making lifestyle changes and using a Mediterranean diet for health benefits and weight loss.  She has been referred  to Transplant and is listed.  She will do PD when she needs it.  I have asked the PD nurse to contact her.  Plan to admit to [facility] for her to start PD when she gets a PD catheter.  We are going to go another month since her labs are acceptable and she has no new symptoms.

## 2024-10-11 ENCOUNTER — TELEPHONE (OUTPATIENT)
Dept: TRANSPLANT | Facility: CLINIC | Age: 59
End: 2024-10-11
Payer: COMMERCIAL

## 2024-10-11 DIAGNOSIS — Z76.82 ORGAN TRANSPLANT CANDIDATE: Primary | ICD-10-CM

## 2024-11-05 ENCOUNTER — PATIENT MESSAGE (OUTPATIENT)
Dept: NEPHROLOGY | Facility: CLINIC | Age: 59
End: 2024-11-05
Payer: COMMERCIAL

## 2024-11-06 ENCOUNTER — TELEPHONE (OUTPATIENT)
Dept: CARDIOLOGY | Facility: HOSPITAL | Age: 59
End: 2024-11-06
Payer: COMMERCIAL

## 2024-11-06 ENCOUNTER — LAB VISIT (OUTPATIENT)
Dept: LAB | Facility: HOSPITAL | Age: 59
End: 2024-11-06
Payer: COMMERCIAL

## 2024-11-06 DIAGNOSIS — R35.89 FREQUENCY OF URINATION AND POLYURIA: ICD-10-CM

## 2024-11-06 DIAGNOSIS — Z76.82 PRE-KIDNEY TRANSPLANT, LISTED: ICD-10-CM

## 2024-11-06 DIAGNOSIS — R10.2 PELVIC PAIN: ICD-10-CM

## 2024-11-06 DIAGNOSIS — Z76.82 ORGAN TRANSPLANT CANDIDATE: ICD-10-CM

## 2024-11-06 DIAGNOSIS — R35.0 FREQUENCY OF URINATION AND POLYURIA: ICD-10-CM

## 2024-11-06 LAB
ANION GAP SERPL CALC-SCNC: 9 MMOL/L (ref 8–16)
BUN SERPL-MCNC: 63 MG/DL (ref 6–20)
CALCIUM SERPL-MCNC: 10.1 MG/DL (ref 8.7–10.5)
CHLORIDE SERPL-SCNC: 107 MMOL/L (ref 95–110)
CO2 SERPL-SCNC: 23 MMOL/L (ref 23–29)
CREAT SERPL-MCNC: 4.2 MG/DL (ref 0.5–1.4)
EST. GFR  (NO RACE VARIABLE): 11.6 ML/MIN/1.73 M^2
GLUCOSE SERPL-MCNC: 84 MG/DL (ref 70–110)
POTASSIUM SERPL-SCNC: 4.5 MMOL/L (ref 3.5–5.1)
SODIUM SERPL-SCNC: 139 MMOL/L (ref 136–145)

## 2024-11-06 PROCEDURE — 80048 BASIC METABOLIC PNL TOTAL CA: CPT | Mod: TXP | Performed by: STUDENT IN AN ORGANIZED HEALTH CARE EDUCATION/TRAINING PROGRAM

## 2024-11-06 PROCEDURE — 36415 COLL VENOUS BLD VENIPUNCTURE: CPT | Mod: PO,TXP | Performed by: STUDENT IN AN ORGANIZED HEALTH CARE EDUCATION/TRAINING PROGRAM

## 2024-11-06 PROCEDURE — 99001 SPECIMEN HANDLING PT-LAB: CPT | Mod: TXP | Performed by: NURSE PRACTITIONER

## 2024-11-07 ENCOUNTER — TELEPHONE (OUTPATIENT)
Dept: TRANSPLANT | Facility: CLINIC | Age: 59
End: 2024-11-07
Payer: COMMERCIAL

## 2024-11-07 NOTE — TELEPHONE ENCOUNTER
MA notes per Pre Dialysis adherence form     FOR THE PAST THREE MONTHS:    0-Appt Adhrence  0-No show    No concerns with labs, care giving, transportation, or mental health    Per Dr. Dozier, no concerns with compliance     Nyla Poon  Abdominal Transplant MA

## 2024-11-08 ENCOUNTER — HOSPITAL ENCOUNTER (OUTPATIENT)
Dept: RADIOLOGY | Facility: HOSPITAL | Age: 59
Discharge: HOME | End: 2024-11-08
Attending: NURSE PRACTITIONER
Payer: COMMERCIAL

## 2024-11-08 ENCOUNTER — OFFICE VISIT (OUTPATIENT)
Dept: TRANSPLANT | Facility: CLINIC | Age: 59
End: 2024-11-08
Payer: COMMERCIAL

## 2024-11-08 ENCOUNTER — PATIENT MESSAGE (OUTPATIENT)
Dept: TRANSPLANT | Facility: CLINIC | Age: 59
End: 2024-11-08
Payer: COMMERCIAL

## 2024-11-08 ENCOUNTER — HOSPITAL ENCOUNTER (OUTPATIENT)
Dept: CARDIOLOGY | Facility: HOSPITAL | Age: 59
Discharge: HOME | End: 2024-11-08
Attending: NURSE PRACTITIONER
Payer: COMMERCIAL

## 2024-11-08 VITALS — HEART RATE: 60 BPM | HEIGHT: 63 IN | BODY MASS INDEX: 34.38 KG/M2 | WEIGHT: 194 LBS

## 2024-11-08 VITALS
BODY MASS INDEX: 34.38 KG/M2 | SYSTOLIC BLOOD PRESSURE: 142 MMHG | HEART RATE: 68 BPM | HEIGHT: 63 IN | RESPIRATION RATE: 16 BRPM | WEIGHT: 194 LBS | DIASTOLIC BLOOD PRESSURE: 72 MMHG

## 2024-11-08 VITALS
DIASTOLIC BLOOD PRESSURE: 70 MMHG | SYSTOLIC BLOOD PRESSURE: 150 MMHG | HEIGHT: 63 IN | TEMPERATURE: 97 F | HEART RATE: 71 BPM | BODY MASS INDEX: 38.47 KG/M2 | RESPIRATION RATE: 18 BRPM | OXYGEN SATURATION: 100 % | WEIGHT: 217.13 LBS

## 2024-11-08 DIAGNOSIS — Z76.82 ORGAN TRANSPLANT CANDIDATE: ICD-10-CM

## 2024-11-08 DIAGNOSIS — N05.1 FSGS (FOCAL SEGMENTAL GLOMERULOSCLEROSIS): ICD-10-CM

## 2024-11-08 DIAGNOSIS — N18.5 STAGE 5 CHRONIC KIDNEY DISEASE NOT ON CHRONIC DIALYSIS: ICD-10-CM

## 2024-11-08 DIAGNOSIS — Z76.82 PATIENT ON WAITING LIST FOR KIDNEY TRANSPLANT: Primary | ICD-10-CM

## 2024-11-08 DIAGNOSIS — R94.39 ABNORMAL STRESS TEST: Primary | ICD-10-CM

## 2024-11-08 DIAGNOSIS — N18.6 END STAGE RENAL DISEASE: ICD-10-CM

## 2024-11-08 LAB
ASCENDING AORTA: 3.49 CM
AV AREA BY CONTINUOUS VTI: 2.2 CM2
AV INDEX (PROSTH): 0.63
AV LVOT MEAN GRADIENT: 3 MMHG
AV LVOT PEAK GRADIENT: 5 MMHG
AV MEAN GRADIENT: 7.8 MMHG
AV PEAK GRADIENT: 11.6 MMHG
AV VALVE AREA BY VELOCITY RATIO: 2.2 CM²
AV VALVE AREA: 2.2 CM2
AV VELOCITY RATIO: 0.65
BSA FOR ECHO PROCEDURE: 1.98 M2
CV ECHO LV RWT: 0.26 CM
CV STRESS BASE HR: 59 BPM
DIASTOLIC BLOOD PRESSURE: 92 MMHG
DOP CALC AO PEAK VEL: 1.7 M/S
DOP CALC AO VTI: 39.4 CM
DOP CALC LVOT AREA: 3.5 CM2
DOP CALC LVOT DIAMETER: 2.1 CM
DOP CALC LVOT PEAK VEL: 1.1 M/S
DOP CALC LVOT STROKE VOLUME: 85.5 CM3
DOP CALCLVOT PEAK VEL VTI: 24.7 CM
E WAVE DECELERATION TIME: 238.51 MS
E/A RATIO: 0.9
E/E' RATIO: 10.24 M/S
ECHO EF ESTIMATED: 67 %
ECHO LV POSTERIOR WALL: 0.6 CM (ref 0.6–1.1)
EJECTION FRACTION- HIGH: 59 %
END DIASTOLIC INDEX-HIGH: 155 ML/M2
END DIASTOLIC INDEX-LOW: 91 ML/M2
END SYSTOLIC INDEX-HIGH: 78 ML/M2
END SYSTOLIC INDEX-LOW: 40 ML/M2
FRACTIONAL SHORTENING: 37 % (ref 28–44)
INTERVENTRICULAR SEPTUM: 1.2 CM (ref 0.6–1.1)
IVC DIAMETER: 1.51 CM
LA MAJOR: 5.64 CM
LA MINOR: 5.36 CM
LA WIDTH: 3.77 CM
LEFT ATRIUM SIZE: 3.43 CM
LEFT ATRIUM VOLUME INDEX MOD: 22.6 ML/M2
LEFT ATRIUM VOLUME INDEX: 31.6 ML/M2
LEFT ATRIUM VOLUME MOD: 43.25 ML
LEFT ATRIUM VOLUME: 60.41 CM3
LEFT INTERNAL DIMENSION IN SYSTOLE: 2.9 CM (ref 2.1–4)
LEFT VENTRICLE DIASTOLIC VOLUME INDEX: 50.76 ML/M2
LEFT VENTRICLE DIASTOLIC VOLUME: 96.95 ML
LEFT VENTRICLE MASS INDEX: 72.1 G/M2
LEFT VENTRICLE SYSTOLIC VOLUME INDEX: 17 ML/M2
LEFT VENTRICLE SYSTOLIC VOLUME: 32.46 ML
LEFT VENTRICULAR INTERNAL DIMENSION IN DIASTOLE: 4.6 CM (ref 3.5–6)
LEFT VENTRICULAR MASS: 137.7 G
LV LATERAL E/E' RATIO: 8.7
LV SEPTAL E/E' RATIO: 12.43
MV PEAK A VEL: 0.97 M/S
MV PEAK E VEL: 0.87 M/S
NUC REST DIASTOLIC VOLUME INDEX: 86
NUC REST EJECTION FRACTION: 65
NUC REST SYSTOLIC VOLUME INDEX: 30
NUC STRESS DIASTOLIC VOLUME INDEX: 88
NUC STRESS EJECTION FRACTION: 71 %
NUC STRESS SYSTOLIC VOLUME INDEX: 25
OHS CV CPX 1 MINUTE RECOVERY HEART RATE: 108 BPM
OHS CV CPX 85 PERCENT MAX PREDICTED HEART RATE MALE: 137
OHS CV CPX MAX PREDICTED HEART RATE: 161
OHS CV CPX PATIENT IS FEMALE: 1
OHS CV CPX PATIENT IS MALE: 0
OHS CV CPX PEAK DIASTOLIC BLOOD PRESSURE: 82 MMHG
OHS CV CPX PEAK HEAR RATE: 86 BPM
OHS CV CPX PEAK RATE PRESSURE PRODUCT: NORMAL
OHS CV CPX PEAK SYSTOLIC BLOOD PRESSURE: 230 MMHG
OHS CV CPX PERCENT MAX PREDICTED HEART RATE ACHIEVED: 56
OHS CV CPX RATE PRESSURE PRODUCT PRESENTING: NORMAL
OHS CV INITIAL DOSE: 7.2 MCG/KG/MIN
OHS CV PEAK DOSE: 20.5 MCG/KG/MIN
OHS CV RV/LV RATIO: 0.8 CM
PISA TR MAX VEL: 2.44 M/S
RA MAJOR: 5.08 CM
RA PRESSURE ESTIMATED: 3 MMHG
RA WIDTH: 3.55 CM
RETIRED EF AND QEF - SEE NOTES: 47 %
RIGHT ATRIAL AREA: 15.8 CM2
RIGHT VENTRICLE DIASTOLIC BASEL DIMENSION: 3.7 CM
RV TB RVSP: 5 MMHG
RV TISSUE DOPPLER FREE WALL SYSTOLIC VELOCITY 1 (APICAL 4 CHAMBER VIEW): 12.9 CM/S
SINUS: 3.04 CM
STJ: 2.63 CM
SYSTOLIC BLOOD PRESSURE: 208 MMHG
TDI LATERAL: 0.1 M/S
TDI SEPTAL: 0.07 M/S
TDI: 0.09 M/S
TR MAX PG: 24 MMHG
TRICUSPID ANNULAR PLANE SYSTOLIC EXCURSION: 2.4 CM
TV PEAK GRADIENT: 24 MMHG
TV REST PULMONARY ARTERY PRESSURE: 27 MMHG
Z-SCORE OF LEFT VENTRICULAR DIMENSION IN END DIASTOLE: -1.56
Z-SCORE OF LEFT VENTRICULAR DIMENSION IN END SYSTOLE: -1.05

## 2024-11-08 PROCEDURE — 93306 TTE W/DOPPLER COMPLETE: CPT | Mod: TXP

## 2024-11-08 PROCEDURE — 78452 HT MUSCLE IMAGE SPECT MULT: CPT | Mod: TXP

## 2024-11-08 PROCEDURE — 63600175 PHARM REV CODE 636 W HCPCS: Mod: TXP | Performed by: NURSE PRACTITIONER

## 2024-11-08 PROCEDURE — 99999 PR PBB SHADOW E&M-EST. PATIENT-LVL III: CPT | Mod: PBBFAC,TXP,, | Performed by: NURSE PRACTITIONER

## 2024-11-08 PROCEDURE — 71046 X-RAY EXAM CHEST 2 VIEWS: CPT | Mod: TC,TXP

## 2024-11-08 PROCEDURE — 76770 US EXAM ABDO BACK WALL COMP: CPT | Mod: TC,TXP

## 2024-11-08 PROCEDURE — A9502 TC99M TETROFOSMIN: HCPCS | Mod: TXP | Performed by: NURSE PRACTITIONER

## 2024-11-08 PROCEDURE — 76770 US EXAM ABDO BACK WALL COMP: CPT | Mod: 26,TXP,, | Performed by: RADIOLOGY

## 2024-11-08 RX ORDER — REGADENOSON 0.08 MG/ML
0.4 INJECTION, SOLUTION INTRAVENOUS
Status: COMPLETED | OUTPATIENT
Start: 2024-11-08 | End: 2024-11-08

## 2024-11-08 RX ORDER — AMINOPHYLLINE 25 MG/ML
75 INJECTION, SOLUTION INTRAVENOUS
Status: COMPLETED | OUTPATIENT
Start: 2024-11-08 | End: 2024-11-08

## 2024-11-08 RX ADMIN — TETROFOSMIN 20.5 MILLICURIE: 1.38 INJECTION, POWDER, LYOPHILIZED, FOR SOLUTION INTRAVENOUS at 10:11

## 2024-11-08 RX ADMIN — AMINOPHYLLINE 75 MG: 25 INJECTION, SOLUTION INTRAVENOUS at 10:11

## 2024-11-08 RX ADMIN — TETROFOSMIN 7.2 MILLICURIE: 1.38 INJECTION, POWDER, LYOPHILIZED, FOR SOLUTION INTRAVENOUS at 08:11

## 2024-11-08 RX ADMIN — REGADENOSON 0.4 MG: 0.08 INJECTION, SOLUTION INTRAVENOUS at 10:11

## 2024-11-08 NOTE — LETTER
November 11, 2024        Keshav Dozier  [address] [address]  [address]  [facility] [address]  Phone: [phone]  Fax: [phone]             [facility] 1st Fl  [address]  [facility] [address]  Phone: [phone]   Patient: Jacqui Leonard   MR Number: [id number]   YOB: 1965   Date of Visit: 11/8/2024       Dear Dr. Keshav Dozier    Thank you for referring Jacqui Leonard to me for evaluation. Attached you will find relevant portions of my assessment and plan of care.    If you have questions, please do not hesitate to call me. I look forward to following Jacqui Leonard along with you.    Sincerely,    Mireille Kerr, NP    Enclosure    If you would like to receive this communication electronically, please contact [email or web address] or [phone] to request VUELOGIC Link access.    VUELOGIC Link is a tool which provides read-only access to select patient information with whom you have a relationship. Its easy to use and provides real time access to review your patients record including encounter summaries, notes, results, and demographic information.    If you feel you have received this communication in error or would no longer like to receive these types of communications, please e-mail [email or web address]

## 2024-11-08 NOTE — PROGRESS NOTES
Transplant Psychosocial Evaluation Update:    Last psychosocial evaluation for transplant was completed on 11/9/2023 by Yonas Martel LCSW.     Pt presents today in company of Khushboo Regalado, friend/caregiver. Khushboo Regalado, friend/caregiver present during assessment with patients permission.  Pt and Khushboo Regalado  present as alert, oriented to person, place, time, purpose of visit. Pt and Khushboo Regalado deny concerns about going through with transplant and state motivation and sense of preparedness for transplantation, caregiver role, psychosocial risk factors, medical risk factors, financial aspects, and lifetime commitments. All concerns and education points as per transplant psychosocial evaluation process addressed (also refer to psychosocial dated 11/9/2023). Pt actively participated in today's interview, with Khushboo Regalado participating as caregiver. Pt asking questions appropriately and denies changes to previous psychosocial evaluation for transplantation which we reviewed line by line with pt and, per pt choice, with pts caregiver today.    Primary Caregivers and Transportation for Transplant:  1. Zulay Lomax ([phone]) 62 y/o cousin; resides in [place], [place] and drives reliable vehicle.   2.  Khushboosindy oCrtez ([phone]) 58 y/o friend; resides in [place], [place] and drives reliable vehicle. .  3. Linda Messina ([phone]) 66 y/o neighbor/friend; resides in [place], [place] and drives reliable vehicle.     Both pt and caregiver/s plan to stay locally at home - information reviewed in depth. Caregivers plan to stay at hospital as appropriate for transplant and post-transplant process.    Pts Vocation: Pt works at [employer]  for over 30 years.     DME: None      ADLS:  Patient is independent. Patient can ambulate, complete ADL's, drive and manage medications without assistance.    Household and Dependents: pt lives alone and has no dependents at this time. Del Orr,  grandson (24) lives in home on same property as pt.     Income: Pt states ability to afford all monthly expenses and costs including for medications now and if transplanted based on income and on savings and assets.    Dialysis Adherence: Patient is pre-dialysis.     Living Donor: Patient is actively looking for a living donor. Living donor education provided.     Advance Directives:  provided patient with copy of LW/HCPA documents and provided education on completion of forms.     Pt and friend deny any additional concerns, stating preparedness and motivation to proceed with organ transplant. Pt denied needing or wanting resources or referrals at this time. Pt agreed to contact  team as needed.    Suitability for Transplant:   Pt remains low risk for transplant at this time based on psychosocial risk factors and strengths. Pt has adequate family and caregiver support, good adherence, appropriate coping skills, medical insurance, reliable transportation and being able to financially can afford medications.    Patient is highly motivated to receive kidney transplant. SW recommends patient contact insurance provider regarding lodging/travel benefits, conduct fundraising to assist patient with pay for cost of medications, food, gas, and other transplant related needs. SW recommends that patient remain aware of potential mental health concerns and contact the team if any concerns arise.  SW recommends that patient remain abstinent from tobacco, ETOH, and drug use. SW supports patient continued adherence. SW remains available to answer any questions or concerns that arise as the patient moves through the transplant process.      Pt lianna well overall with health needs and life in general, has stable supports, adequate insurance, financial stability, transportation and caregiver plans in place, and is using no substances unless prescribed.     Sherley Vega LCSW

## 2024-11-08 NOTE — PROGRESS NOTES
Kidney Transplant Recipient Reevalulation    Referring Physician: Keshav Dozier  Current Nephrologist: Keshav Dozier  Waitlist Status: active  Dialysis Start Date: (Not currently on dialysis)    Subjective:     CC:  Annual reassessment of kidney transplant candidacy.    HPI:  Ms. Leonard is a 58 y.o. 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  She has been on the wait list for a kidney transplant at [facility] since 3/21/2023. Patient is currently pre-dialysis. She has  no . Patient denies any recent hospitalizations or ED visits.   Latest Reference Range & Units 11/06/24 08:27   eGFR >60 mL/min/1.73 m^2 11.6 !     Fx assessment   Is active , still works, does resistance training at home. Keeps up with home, runs errands, looks great. -- no change       Renal US: pending.    CXR: pending  2/8/24 MMG (-)  2/9/21 PAP (-)  11/7/2 Iliacs: Triphasic bilaterally   9/23/2019 colonoscopy:  Normal duodenum: recs: colonoscopy in 5 years     Results for orders placed during the hospital encounter of 11/08/24    Echo    Interpretation Summary    Left Ventricle: The left ventricle is normal in size. Normal wall thickness. Normal wall motion. There is normal systolic function with a visually estimated ejection fraction of 60 - 65%. There is normal diastolic function.    Right Ventricle: Normal right ventricular cavity size. Wall thickness is normal. Systolic function is normal.    Mitral Valve: There is mild regurgitation.    Pulmonary Artery: The estimated pulmonary artery systolic pressure is 27 mmHg.    IVC/SVC: Normal venous pressure at 3 mmHg.    Results for orders placed during the hospital encounter of 11/08/24    Lexiscan Card Interp    Interpretation Summary    Abnormal myocardial perfusion scan.    There is a moderate to severe intensity, small sized, reversible perfusion abnormality that is consistent with ischemia in the inferoapical wall(s).    There are no other significant perfusion  abnormalities.    The gated perfusion images showed an ejection fraction of 65% at rest. The gated perfusion images showed an ejection fraction of 71% post stress. Normal ejection fraction is greater than 47%.    There is normal wall motion at rest and post-stress.    LV cavity size is normal at rest and normal at post-stress.    The ECG portion of the study is negative for ischemia.    The patient reported no chest pain during the stress test.    There were no arrhythmias during stress.    When compared to a prior study from 12/28/2022, there is now a small, reversible inferoapical defect.           Past Medical History:   Diagnosis Date    Abnormal Pap smear     Chronic kidney disease     - Stage 3    Depression     GERD (gastroesophageal reflux disease)     Mitral valve insufficiency     Mitral valve prolapse     Obesity     Sleep apnea     Thyroid disease        Review of Systems   Constitutional:  Positive for fatigue. Negative for activity change, appetite change, chills, fever and unexpected weight change.   HENT:  Negative for congestion, facial swelling, postnasal drip, rhinorrhea, sinus pressure, sore throat and trouble swallowing.    Eyes:  Positive for visual disturbance (wears glasses). Negative for pain and redness.   Respiratory:  Negative for cough, chest tightness, shortness of breath and wheezing.    Cardiovascular: Negative.  Negative for chest pain, palpitations and leg swelling.        Reported h/o mitral valve prolapse.   Gastrointestinal:  Positive for constipation (intermittent). Negative for abdominal pain, diarrhea, nausea and vomiting.   Genitourinary:  Negative for dysuria, flank pain and urgency.   Musculoskeletal:  Negative for gait problem, neck pain and neck stiffness.   Skin:  Negative for rash.   Allergic/Immunologic: Negative for environmental allergies, food allergies and immunocompromised state.   Neurological:  Negative for dizziness, weakness, light-headedness and headaches.  "  Psychiatric/Behavioral:  Negative for agitation and confusion. The patient is not nervous/anxious.        Objective: BP (!) 150/70 (BP Location: Right arm, Patient Position: Sitting)   Pulse 71   Temp 97.2 °F (36.2 °C) (Temporal)   Resp 18   Ht 5' 2.99" (1.6 m)   Wt 98.5 kg (217 lb 2.5 oz)   SpO2 100%   BMI 38.48 kg/m²      body mass index is 38.48 kg/m².      Physical Exam  Vitals reviewed.   Constitutional:       Appearance: Normal appearance. She is well-developed.   HENT:      Head: Normocephalic.   Eyes:      Pupils: Pupils are equal, round, and reactive to light.   Cardiovascular:      Rate and Rhythm: Normal rate and regular rhythm.      Heart sounds: Normal heart sounds.   Pulmonary:      Effort: Pulmonary effort is normal.      Breath sounds: Normal breath sounds.   Abdominal:      General: Bowel sounds are normal.      Palpations: Abdomen is soft.   Musculoskeletal:         General: Normal range of motion.      Cervical back: Normal range of motion and neck supple.   Skin:     General: Skin is warm and dry.   Neurological:      Mental Status: She is alert and oriented to person, place, and time.      Motor: No abnormal muscle tone.   Psychiatric:         Behavior: Behavior normal.         Labs:  Lab Results   Component Value Date    WBC 4.44 08/07/2024    HGB 10.3 (L) 08/07/2024    HCT 34.9 (L) 08/07/2024     11/06/2024    K 4.5 11/06/2024     11/06/2024    CO2 23 11/06/2024    BUN 63 (H) 11/06/2024    CREATININE 4.2 (H) 11/06/2024    EGFRNORACEVR 11.6 (A) 11/06/2024    GLUCOSE 90 10/15/2021    CALCIUM 10.1 11/06/2024    PHOS 3.9 10/07/2024    MG 2.1 05/08/2024    ALBUMIN 3.4 (L) 10/07/2024    AST 16 11/03/2022    ALT 11 11/03/2022    UTPCR 3.02 (H) 03/30/2023    .8 (H) 08/07/2024       Lab Results   Component Value Date    BILIRUBINUA Negative 01/24/2024    PROTEINUA 2+ (A) 01/24/2024    NITRITE Negative 01/24/2024    RBCUA 6 (H) 01/24/2024    WBCUA >100 (H) 01/24/2024       No " "results found for: "HLAABCTYPE"    Lab Results   Component Value Date    CPRA 41 08/07/2024    CPRA 48 08/07/2024    CPRA 10 08/07/2024    GB4WDZE A36,A26,A25 08/07/2024    CIABCLM A66 -- WEAK: A43 08/07/2024    CIIAB DR7,DQ2 08/07/2024    ABCMT WEAK: DR53, DR12, DR9, DRB3*03:01 08/07/2024       Labs were reviewed with the patient.    Pre-transplant Workup:   Reviewed with the patient.    Assessment:     1. Patient on waiting list for kidney transplant    2. Stage 5 chronic kidney disease not on chronic dialysis    3. FSGS (focal segmental glomerulosclerosis)        Plan:   There is a moderate to severe intensity, small sized, reversible perfusion abnormality that is consistent with ischemia in the inferoapical wall----Needs Cardiology evaluation scheduled for 11/15/24; will need plan on additional testing     CXR and Renal US results pending     Transplant Candidacy:   Ms. Leonard is a suitable kidney transplant candidate.  Meets center eligibility for accepting HCV+ donor offer - Yes.  Patient educated on HCV+ donors. Jacqui is willing  to accept HCV+ donor offer -  Yes   Patient is a candidate for KDPI > 85 kidney donor offer - No d/t weight   She remains in overall stable health, and will remain active on the transplant list.    Patient advised that it is recommended that all transplant candidates, and their close contacts and household members receive Covid vaccination.    Mireille Kerr NP       Follow-up:   In addition to the tests noted in the plan, Ms. Leonard will continue to have reevaluation as per the standing pre-kidney transplant protocol:  Monthly blood for PRA  Annual return to clinic, except HIV positive, > 65 years of age, or pancreas transplant candidates who will be scheduled to see transplant every 6 months while in pre-transplant phase  Annual re-testing: CXR, EKG, yearly mammograms for women over 40 and PSA for males over 40, cardiology follow-up as recommended by initial cardiology " pre-transplant evaluation  Renal ultrasound every 2 years  Baseline colonoscopy after age 50 and repeated as recommended    UNOS Patient Status  Functional Status: 80% - Normal activity with effort: some symptoms of disease  Physical Capacity: No Limitations

## 2024-11-08 NOTE — PROGRESS NOTES
WAITLIST  PATIENT EDUCATION NOTE    Ms. Jacqui Leonard was seen in pre-kidney transplant clinic for evaluation for kidney, kidney/pancreas or pancreas only transplant.  The patient attended a group education session that discussed/reviewed the following aspects of transplantation: evaluation and selection committee process, UNOS waitlist management/multiple listings, types of organs offered (KDPI < 85%, KDPI > 85%, PHS risk, DCD, HCV+, HIV+ for HIV+ recipients and enbloc/dual), financial aspects, surgical procedures, dietary instruction pre- and post-transplant, health maintenance pre- and post-transplant, post-transplant hospitalization and outpatient follow-up, potential to participate in a research protocol, and medication management and side effects.  A question and answer session was provided after the presentation.    The patient was seen by all members of the multi-disciplinary team to include: Nephrologist/PA, Surgeon, , Transplant Coordinator, , Pharmacist and Dietician (if applicable).    The patient reviewed and signed all consents for evaluation which were witnessed and sent to scanning into the EPIC chart.    The patient was given an education book and plan for further evaluation based on her individual assessment.      Reviewed program requirement for complete COVID vaccination with documentation prior to listing. COVID education information reviewed with patient. Patient encouraged to be up to date on all vaccinations.     The patient was encouraged to call with any questions or concerns.

## 2024-11-18 ENCOUNTER — OFFICE VISIT (OUTPATIENT)
Dept: NEPHROLOGY | Facility: CLINIC | Age: 59
End: 2024-11-18
Payer: COMMERCIAL

## 2024-11-18 ENCOUNTER — OFFICE VISIT (OUTPATIENT)
Dept: CARDIOLOGY | Facility: CLINIC | Age: 59
End: 2024-11-18
Payer: COMMERCIAL

## 2024-11-18 VITALS
WEIGHT: 216.06 LBS | BODY MASS INDEX: 38.28 KG/M2 | HEART RATE: 70 BPM | SYSTOLIC BLOOD PRESSURE: 154 MMHG | DIASTOLIC BLOOD PRESSURE: 70 MMHG | HEIGHT: 63 IN

## 2024-11-18 DIAGNOSIS — E78.00 HYPERCHOLESTEREMIA: ICD-10-CM

## 2024-11-18 DIAGNOSIS — N05.1 FSGS (FOCAL SEGMENTAL GLOMERULOSCLEROSIS): ICD-10-CM

## 2024-11-18 DIAGNOSIS — N28.1 ACQUIRED CYST OF KIDNEY: ICD-10-CM

## 2024-11-18 DIAGNOSIS — N18.5 STAGE 5 CHRONIC KIDNEY DISEASE NOT ON CHRONIC DIALYSIS: ICD-10-CM

## 2024-11-18 DIAGNOSIS — E66.01 SEVERE OBESITY: ICD-10-CM

## 2024-11-18 DIAGNOSIS — G47.33 OSA (OBSTRUCTIVE SLEEP APNEA): ICD-10-CM

## 2024-11-18 DIAGNOSIS — N25.81 SECONDARY RENAL HYPERPARATHYROIDISM: ICD-10-CM

## 2024-11-18 DIAGNOSIS — I10 PRIMARY HYPERTENSION: ICD-10-CM

## 2024-11-18 DIAGNOSIS — Z76.82 ORGAN TRANSPLANT CANDIDATE: ICD-10-CM

## 2024-11-18 DIAGNOSIS — R94.39 ABNORMAL STRESS TEST: ICD-10-CM

## 2024-11-18 DIAGNOSIS — R80.1 PERSISTENT PROTEINURIA: ICD-10-CM

## 2024-11-18 DIAGNOSIS — N18.5 CKD (CHRONIC KIDNEY DISEASE) STAGE 5, GFR LESS THAN 15 ML/MIN: Primary | ICD-10-CM

## 2024-11-18 PROCEDURE — 1159F MED LIST DOCD IN RCRD: CPT | Mod: CPTII,95,, | Performed by: INTERNAL MEDICINE

## 2024-11-18 PROCEDURE — 99214 OFFICE O/P EST MOD 30 MIN: CPT | Mod: S$GLB,TXP,, | Performed by: INTERNAL MEDICINE

## 2024-11-18 PROCEDURE — 99999 PR PBB SHADOW E&M-EST. PATIENT-LVL IV: CPT | Mod: PBBFAC,TXP,, | Performed by: INTERNAL MEDICINE

## 2024-11-18 PROCEDURE — 99214 OFFICE O/P EST MOD 30 MIN: CPT | Mod: 95,,, | Performed by: INTERNAL MEDICINE

## 2024-11-18 PROCEDURE — 1160F RVW MEDS BY RX/DR IN RCRD: CPT | Mod: CPTII,95,, | Performed by: INTERNAL MEDICINE

## 2024-11-18 PROCEDURE — 3066F NEPHROPATHY DOC TX: CPT | Mod: CPTII,95,, | Performed by: INTERNAL MEDICINE

## 2024-11-18 RX ORDER — AMLODIPINE BESYLATE 5 MG/1
5 TABLET ORAL DAILY
Qty: 90 TABLET | Refills: 3 | Status: SHIPPED | OUTPATIENT
Start: 2024-11-18 | End: 2025-11-18

## 2024-11-18 NOTE — PROGRESS NOTES
Subjective:       Patient ID: Jacqui Leonard is a 58 y.o. White female who presents for return patient evaluation for chronic renal failure.    The patient location is:  Patient Home   The chief complaint leading to consultation is: ckd  Visit type: Virtual visit with synchronous audio and video  Total time spent with patient: 11 minutes  Each patient to whom he or she provides medical services by telemedicine is:  (1) informed of the relationship between the physician and patient and the respective role of any other health care provider with respect to management of the patient; and (2) notified that he or she may decline to receive medical services by telemedicine and may withdraw from such care at any time.       There have been no recent hospitalizations or procedures.  She had a renal biopsy in 2006.  She had COVID in June 2022.  Her donor was rejected earlier this year.  She is feeling well currently.  She is on hold due to a new Cardiology test.  Her blood pressure at home is typically 110-120/70s.      Review of Systems   Constitutional:  Positive for fatigue. Negative for appetite change, chills and fever.   HENT:  Negative for congestion.    Eyes:  Negative for visual disturbance.   Respiratory:  Positive for shortness of breath (with exertion). Negative for cough.    Cardiovascular:  Negative for chest pain and leg swelling.   Gastrointestinal:  Positive for constipation. Negative for nausea.   Genitourinary:  Negative for difficulty urinating, dysuria and hematuria.   Musculoskeletal:  Positive for gait problem and neck pain. Negative for arthralgias and back pain.   Skin:  Negative for rash.   Neurological:  Positive for headaches (occasional).   Psychiatric/Behavioral:  Positive for decreased concentration (brain fog) and sleep disturbance. The patient is nervous/anxious.        The past medical, family and social histories were reviewed for this encounter     There were no vitals taken for  this visit.    Objective:      Physical Exam  Vitals reviewed.   Constitutional:       General: She is not in acute distress.     Appearance: She is well-developed.   HENT:      Head: Normocephalic and atraumatic.   Eyes:      General: No scleral icterus.  Pulmonary:      Effort: Pulmonary effort is normal.   Neurological:      Mental Status: She is alert and oriented to person, place, and time.   Psychiatric:         Mood and Affect: Mood normal.         Behavior: Behavior normal.        Assessment:       1. CKD (chronic kidney disease) stage 5, GFR less than 15 ml/min    2. Persistent proteinuria    3. Acquired cyst of kidney    4. Secondary renal hyperparathyroidism    5. Severe obesity    6. FSGS (focal segmental glomerulosclerosis)        Lab Results   Component Value Date    CREATININE 4.2 (H) 11/06/2024    BUN 63 (H) 11/06/2024     11/06/2024    K 4.5 11/06/2024     11/06/2024    CO2 23 11/06/2024     Lab Results   Component Value Date    .8 (H) 08/07/2024    CALCIUM 10.1 11/06/2024    PHOS 3.9 10/07/2024     Lab Results   Component Value Date    HCT 34.9 (L) 08/07/2024     Prot/Creat Ratio, Urine   Date Value Ref Range Status   03/30/2023 3.02 (H) 0.00 - 0.20 Final   08/02/2022 5.38 (H) 0.00 - 0.20 Final   04/06/2022 1.92 (H) 0.00 - 0.20 Final     Plan:   Return to clinic in 4 weeks - make her appointments on the second week of the month.  Labs for this week includes rp at the [place] location on weekday or [facility] on weekend ([facility]).   Baseline creatinine is 1.2-1.5 prior to 2016.  She has been 1.8-2.4 since 2020.  Since then she has been progressing.  PTH is 104 with a calcium of 10.1.  Continue calcitriol 0.5 mcg daily.  Continue binder.  UPC is 3.0.  She used to be on an ACE I but is not on one currently due to low blood pressure.  Re-biopsy was not done due to increased bleeding risk.  Renal US shows R 10.5 cm L 10.7 cm.  Kidney Smart referral was been ordered/completed.  We discussed  making lifestyle changes and using a Mediterranean diet for health benefits and weight loss.  She has been referred to Transplant and is listed.  She will do PD when she needs it.  I have asked the PD nurse to contact her.  Plan to admit to [facility] for her to start PD when she gets a PD catheter.  We are going to go another month since her labs are acceptable and she has no new symptoms.

## 2024-11-18 NOTE — PROGRESS NOTES
Subjective:   Patient ID:  Jacqui Leonard is a 58 y.o. female who presents for follow-up of Kidney Transplant Pre-evaluation     Jacqui Leonard is a 57 y.o. 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   occasional palpitations or fluttering in the chest that makes her feel SOB   She has been told she has MV prolapse     Echo       Normal left ventricular cavity size.     Increased left ventricular wall thickness.     Calculated left ventricular ejection fraction by 2D tracking is 67%.     Grade I diastolic dysfunction (abnormal relaxation filling pattern), normal to mildly elevated filling pressures.     Normal left atrial size.     Mild mitral valve regurgitation.     Structurally normal trileaflet aortic valve.     Trace to mild tricuspid valve regurgitation.    Echo   NSR 67 bpm     Normal right heart size     Mild TR / PAsys ~ 24 mmHg     LA appears mildly enlarged / LAD 44 mm     Difficult assess MV anatomy - anterior MVL appears to billow     MR - moderate  /posteriorly directed     Mild LVH / EF 58%      Normal diastolic parameters     Normal aortic valve     No AS and no AR     No pericardial effusion        Echo      The left ventricle is normal in size with concentric remodeling and normal systolic function.  The estimated ejection fraction is 63%.  Normal left  ventricular diastolic function.  Mild left atrial enlargement.  Normal right ventricular size with low normal right ventricular systolic function.  Normal central venous pressure (3 mmHg).  Trivial posterior pericardial effusion.     HPI:   He was taken off BP medication from nephrologist.   No chest pain, Orthopnea, PND of heart failure symptoms.   Denies palpitations or fluttering in the chest     NM stress test 12/22    Normal myocardial perfusion scan. There is no evidence of myocardial ischemia or infarction.    The gated perfusion images showed an ejection fraction of 65% at rest. The gated perfusion images showed an ejection fraction of 67% post stress. Normal ejection fraction is greater than 47%.    There is normal wall motion at rest and post stress.    LV cavity size is normal at rest and normal at stress.    The ECG portion of the study is negative for ischemia.    The patient reported no chest pain during the stress test.    There were no arrhythmias during stress.    There are no prior studies for comparison.     Carotid US  There is 0-19% right Internal Carotid Stenosis.  There is 0-19% left Internal Carotid Stenosis.     Nuclear stress test 2024    Abnormal myocardial perfusion scan.    There is a moderate to severe intensity, small sized, reversible perfusion abnormality that is consistent with ischemia in the inferoapical wall(s).    There are no other significant perfusion abnormalities.    The gated perfusion images showed an ejection fraction of 65% at rest. The gated perfusion images showed an ejection fraction of 71% post stress. Normal ejection fraction is greater than 47%.    There is normal wall motion at rest and post-stress.    LV cavity size is normal at rest and normal at post-stress.    The ECG portion of the study is negative for ischemia.    The patient reported no chest pain during the stress test.    There were no arrhythmias during stress.    When compared to a prior study from  "2022, there is now a small, reversible inferoapical defect.    HPI:   He was taken off BP medication from nephrologist.   No chest pain, Orthopnea, PND of heart failure symptoms.   Denies palpitations or fluttering in the chest\  Mother had heart failure at 63 and grandfather had massive MI in 50s and   Patient had an abnormal stress test on repeat nuclear testing      Polysomnogram  1. Significant ELIF (G47.33) based on AHI criteria   2. Effective control of sleep disordered breathing was achieved with CPAP at 7 cm of water.   Patient Active Problem List   Diagnosis    Persistent proteinuria    BMI 36.0-36.9,adult    Acquired cyst of kidney    Mass of uterine cervix    Hematocolpos    FSGS (focal segmental glomerulosclerosis)    Secondary renal hyperparathyroidism    Stage 5 chronic kidney disease not on chronic dialysis    Mitral valve prolapse    Class 2 severe obesity due to excess calories with serious comorbidity and body mass index (BMI) of 38.0 to 38.9 in adult    Patient on waiting list for kidney transplant     BP (!) 154/70 (BP Location: Left arm, Patient Position: Sitting)   Pulse 70   Ht 5' 3" (1.6 m)   Wt 98 kg (216 lb 0.8 oz)   BMI 38.27 kg/m²   Body mass index is 38.27 kg/m².  CrCl cannot be calculated (Patient's most recent lab result is older than the maximum 7 days allowed.).    Lab Results   Component Value Date     2024    K 4.5 2024     2024    CO2 23 2024    BUN 63 (H) 2024    CREATININE 4.2 (H) 2024    GLU 84 2024    MG 2.1 2024    AST 16 2022    ALT 11 2022    ALBUMIN 3.4 (L) 10/07/2024    PROT 6.9 2022    BILITOT 0.3 2022    WBC 4.44 2024    HGB 10.3 (L) 2024    HCT 34.9 (L) 2024    MCV 97 2024     2024    INR 0.9 2022    TSH 2.150 2021    CHOL 141 2023    HDL 61 2023    LDLCALC 70.4 2023    TRIG 48 2023       No results " "found for: "APOLIPOPNB", "LIPOA", "HSCRP", "CRP"      No results found for this or any previous visit.        Current Outpatient Medications   Medication Sig    calcitRIOL (ROCALTROL) 0.5 MCG Cap TAKE 1 CAPSULE (0.5 MCG TOTAL) BY MOUTH ONCE DAILY.    calcium carb-magnesium carb (MAGNEBIND 400)  mg Tab Take 1 tablet by mouth 3 (three) times daily with meals.    clotrimazole-betamethasone 1-0.05% (LOTRISONE) cream Apply 1 g topically as needed.    estradioL (ESTRACE) 0.01 % (0.1 mg/gram) vaginal cream Place 1 g vaginally twice a week.    Lactobacillus rhamnosus GG (CULTURELLE) 10 billion cell capsule Take 1 capsule by mouth once daily.    rosuvastatin (CRESTOR) 20 MG tablet TAKE 1 TABLET BY MOUTH EVERY DAY IN THE EVENING     No current facility-administered medications for this visit.       Review of Systems   Constitutional: Negative for chills, decreased appetite, malaise/fatigue, night sweats, weight gain and weight loss.   Eyes:  Negative for blurred vision, double vision, visual disturbance and visual halos.   Cardiovascular:  Negative for chest pain, claudication, cyanosis, dyspnea on exertion, irregular heartbeat, leg swelling, near-syncope, orthopnea, palpitations, paroxysmal nocturnal dyspnea and syncope.   Respiratory:  Negative for cough, hemoptysis, snoring, sputum production and wheezing.    Endocrine: Negative for cold intolerance, heat intolerance, polydipsia and polyphagia.   Hematologic/Lymphatic: Negative for adenopathy and bleeding problem. Does not bruise/bleed easily.   Skin:  Negative for flushing, itching, poor wound healing and rash.   Musculoskeletal:  Negative for arthritis, back pain, falls, gout, joint pain, joint swelling, muscle cramps, muscle weakness, myalgias, neck pain and stiffness.   Gastrointestinal:  Negative for bloating, abdominal pain, anorexia, diarrhea, dysphagia, excessive appetite, flatus, hematemesis, jaundice, melena and nausea.   Genitourinary:  Negative for " hesitancy and incomplete emptying.   Neurological:  Negative for aphonia, brief paralysis, difficulty with concentration, disturbances in coordination, excessive daytime sleepiness, dizziness, focal weakness, light-headedness, loss of balance and weakness.   Psychiatric/Behavioral:  Negative for altered mental status, depression, hallucinations, hypervigilance, memory loss, substance abuse and suicidal ideas. The patient does not have insomnia and is not nervous/anxious.        Objective:   Physical Exam  Constitutional:       General: She is not in acute distress.     Appearance: She is well-developed. She is not diaphoretic.   HENT:      Head: Normocephalic and atraumatic.      Nose: Nose normal.      Mouth/Throat:      Pharynx: No oropharyngeal exudate.   Eyes:      General: No scleral icterus.        Right eye: No discharge.         Left eye: No discharge.      Conjunctiva/sclera: Conjunctivae normal.      Pupils: Pupils are equal, round, and reactive to light.   Neck:      Thyroid: No thyromegaly.      Vascular: No JVD.      Trachea: No tracheal deviation.   Cardiovascular:      Rate and Rhythm: Normal rate and regular rhythm.      Pulses: Intact distal pulses.      Heart sounds: Normal heart sounds. No murmur heard.     No friction rub. No gallop.   Pulmonary:      Effort: Pulmonary effort is normal. No respiratory distress.      Breath sounds: Normal breath sounds. No stridor. No wheezing or rales.   Chest:      Chest wall: No tenderness.   Abdominal:      General: Bowel sounds are normal. There is no distension.      Palpations: Abdomen is soft. There is no mass.      Tenderness: There is no abdominal tenderness. There is no guarding or rebound.   Musculoskeletal:         General: No tenderness. Normal range of motion.      Cervical back: Normal range of motion and neck supple.   Lymphadenopathy:      Cervical: No cervical adenopathy.   Skin:     General: Skin is warm.      Coloration: Skin is not pale.       Findings: No erythema or rash.   Neurological:      Mental Status: She is alert and oriented to person, place, and time.      Cranial Nerves: No cranial nerve deficit.      Motor: No abnormal muscle tone.      Coordination: Coordination normal.      Deep Tendon Reflexes: Reflexes are normal and symmetric. Reflexes normal.   Psychiatric:         Behavior: Behavior normal.         Thought Content: Thought content normal.         Judgment: Judgment normal.         Assessment:     1. BMI 36.0-36.9,adult    2. Abnormal stress test    3. Organ transplant candidate    4. Stage 5 chronic kidney disease not on chronic dialysis        Plan:     Will review Nuclear test with nephology to see the possibility of artifact here. May need a PET stress in the case of artifact    Counseled on importance of heart healthy diet low in saturated and trans fat and salt as well gradually starting a regular aerobic exercise regimen with goal of 30min 5x/week. Recommend BP diary. Call if systolic BP > 130 mmHg on checking repeatedly

## 2024-11-19 ENCOUNTER — TELEPHONE (OUTPATIENT)
Dept: TRANSPLANT | Facility: CLINIC | Age: 59
End: 2024-11-19
Payer: COMMERCIAL

## 2024-11-19 ENCOUNTER — PATIENT MESSAGE (OUTPATIENT)
Dept: TRANSPLANT | Facility: CLINIC | Age: 59
End: 2024-11-19
Payer: COMMERCIAL

## 2024-11-21 ENCOUNTER — PATIENT MESSAGE (OUTPATIENT)
Dept: CARDIOLOGY | Facility: CLINIC | Age: 59
End: 2024-11-21
Payer: COMMERCIAL

## 2024-11-22 ENCOUNTER — HOSPITAL ENCOUNTER (EMERGENCY)
Facility: HOSPITAL | Age: 59
Discharge: HOME | End: 2024-11-22
Attending: EMERGENCY MEDICINE
Payer: COMMERCIAL

## 2024-11-22 VITALS
BODY MASS INDEX: 38.27 KG/M2 | SYSTOLIC BLOOD PRESSURE: 142 MMHG | RESPIRATION RATE: 16 BRPM | DIASTOLIC BLOOD PRESSURE: 65 MMHG | HEART RATE: 65 BPM | OXYGEN SATURATION: 100 % | HEIGHT: 63 IN | TEMPERATURE: 98 F | WEIGHT: 216 LBS

## 2024-11-22 DIAGNOSIS — R11.2 NAUSEA AND VOMITING, UNSPECIFIED VOMITING TYPE: Primary | ICD-10-CM

## 2024-11-22 DIAGNOSIS — R00.1 BRADYCARDIA: ICD-10-CM

## 2024-11-22 DIAGNOSIS — R42 VERTIGO: ICD-10-CM

## 2024-11-22 LAB
ALBUMIN SERPL BCP-MCNC: 3.5 G/DL (ref 3.5–5.2)
ALP SERPL-CCNC: 49 U/L (ref 40–150)
ALT SERPL W/O P-5'-P-CCNC: 14 U/L (ref 10–44)
ANION GAP SERPL CALC-SCNC: 11 MMOL/L (ref 8–16)
AST SERPL-CCNC: 18 U/L (ref 10–40)
BACTERIA #/AREA URNS AUTO: NORMAL /HPF
BASOPHILS # BLD AUTO: 0.02 K/UL (ref 0–0.2)
BASOPHILS NFR BLD: 0.3 % (ref 0–1.9)
BILIRUB SERPL-MCNC: 0.2 MG/DL (ref 0.1–1)
BILIRUB UR QL STRIP: NEGATIVE
BNP SERPL-MCNC: 47 PG/ML (ref 0–99)
BUN SERPL-MCNC: 59 MG/DL (ref 6–20)
CALCIUM SERPL-MCNC: 10 MG/DL (ref 8.7–10.5)
CHLORIDE SERPL-SCNC: 110 MMOL/L (ref 95–110)
CLARITY UR REFRACT.AUTO: CLEAR
CO2 SERPL-SCNC: 18 MMOL/L (ref 23–29)
COLOR UR AUTO: COLORLESS
CREAT SERPL-MCNC: 4 MG/DL (ref 0.5–1.4)
DIFFERENTIAL METHOD BLD: ABNORMAL
EOSINOPHIL # BLD AUTO: 0.1 K/UL (ref 0–0.5)
EOSINOPHIL NFR BLD: 0.9 % (ref 0–8)
ERYTHROCYTE [DISTWIDTH] IN BLOOD BY AUTOMATED COUNT: 13.4 % (ref 11.5–14.5)
EST. GFR  (NO RACE VARIABLE): 12.3 ML/MIN/1.73 M^2
GLUCOSE SERPL-MCNC: 92 MG/DL (ref 70–110)
GLUCOSE UR QL STRIP: ABNORMAL
HCT VFR BLD AUTO: 31.4 % (ref 37–48.5)
HGB BLD-MCNC: 9.9 G/DL (ref 12–16)
HGB UR QL STRIP: ABNORMAL
HYALINE CASTS UR QL AUTO: 0 /LPF
IMM GRANULOCYTES # BLD AUTO: 0.02 K/UL (ref 0–0.04)
IMM GRANULOCYTES NFR BLD AUTO: 0.3 % (ref 0–0.5)
KETONES UR QL STRIP: NEGATIVE
LEUKOCYTE ESTERASE UR QL STRIP: NEGATIVE
LIPASE SERPL-CCNC: 81 U/L (ref 4–60)
LYMPHOCYTES # BLD AUTO: 0.6 K/UL (ref 1–4.8)
LYMPHOCYTES NFR BLD: 7.7 % (ref 18–48)
MCH RBC QN AUTO: 29.2 PG (ref 27–31)
MCHC RBC AUTO-ENTMCNC: 31.5 G/DL (ref 32–36)
MCV RBC AUTO: 93 FL (ref 82–98)
MICROSCOPIC COMMENT: NORMAL
MONOCYTES # BLD AUTO: 0.5 K/UL (ref 0.3–1)
MONOCYTES NFR BLD: 6.5 % (ref 4–15)
NEUTROPHILS # BLD AUTO: 6.4 K/UL (ref 1.8–7.7)
NEUTROPHILS NFR BLD: 84.3 % (ref 38–73)
NITRITE UR QL STRIP: NEGATIVE
NRBC BLD-RTO: 0 /100 WBC
PH UR STRIP: 7 [PH] (ref 5–8)
PLATELET # BLD AUTO: 210 K/UL (ref 150–450)
PMV BLD AUTO: 10.1 FL (ref 9.2–12.9)
POTASSIUM SERPL-SCNC: 4.1 MMOL/L (ref 3.5–5.1)
PROT SERPL-MCNC: 7.1 G/DL (ref 6–8.4)
PROT UR QL STRIP: ABNORMAL
RBC # BLD AUTO: 3.39 M/UL (ref 4–5.4)
RBC #/AREA URNS AUTO: 0 /HPF (ref 0–4)
SODIUM SERPL-SCNC: 139 MMOL/L (ref 136–145)
SP GR UR STRIP: 1.01 (ref 1–1.03)
SQUAMOUS #/AREA URNS AUTO: 4 /HPF
TROPONIN I SERPL DL<=0.01 NG/ML-MCNC: 0.01 NG/ML (ref 0–0.03)
URN SPEC COLLECT METH UR: ABNORMAL
WBC # BLD AUTO: 7.64 K/UL (ref 3.9–12.7)
WBC #/AREA URNS AUTO: 1 /HPF (ref 0–5)

## 2024-11-22 PROCEDURE — 93010 ELECTROCARDIOGRAM REPORT: CPT | Mod: NTX,,, | Performed by: INTERNAL MEDICINE

## 2024-11-22 PROCEDURE — 84484 ASSAY OF TROPONIN QUANT: CPT | Mod: NTX | Performed by: EMERGENCY MEDICINE

## 2024-11-22 PROCEDURE — 99284 EMERGENCY DEPT VISIT MOD MDM: CPT | Mod: 25,NTX

## 2024-11-22 PROCEDURE — 96374 THER/PROPH/DIAG INJ IV PUSH: CPT | Mod: NTX

## 2024-11-22 PROCEDURE — 25000003 PHARM REV CODE 250: Mod: NTX | Performed by: STUDENT IN AN ORGANIZED HEALTH CARE EDUCATION/TRAINING PROGRAM

## 2024-11-22 PROCEDURE — 80053 COMPREHEN METABOLIC PANEL: CPT | Mod: NTX | Performed by: STUDENT IN AN ORGANIZED HEALTH CARE EDUCATION/TRAINING PROGRAM

## 2024-11-22 PROCEDURE — 63600175 PHARM REV CODE 636 W HCPCS: Mod: NTX | Performed by: STUDENT IN AN ORGANIZED HEALTH CARE EDUCATION/TRAINING PROGRAM

## 2024-11-22 PROCEDURE — 93005 ELECTROCARDIOGRAM TRACING: CPT | Mod: NTX

## 2024-11-22 PROCEDURE — 83690 ASSAY OF LIPASE: CPT | Mod: NTX | Performed by: STUDENT IN AN ORGANIZED HEALTH CARE EDUCATION/TRAINING PROGRAM

## 2024-11-22 PROCEDURE — 85025 COMPLETE CBC W/AUTO DIFF WBC: CPT | Mod: NTX | Performed by: STUDENT IN AN ORGANIZED HEALTH CARE EDUCATION/TRAINING PROGRAM

## 2024-11-22 PROCEDURE — 83880 ASSAY OF NATRIURETIC PEPTIDE: CPT | Mod: NTX | Performed by: EMERGENCY MEDICINE

## 2024-11-22 PROCEDURE — 81001 URINALYSIS AUTO W/SCOPE: CPT | Mod: NTX | Performed by: STUDENT IN AN ORGANIZED HEALTH CARE EDUCATION/TRAINING PROGRAM

## 2024-11-22 RX ORDER — MECLIZINE HYDROCHLORIDE 25 MG/1
25 TABLET ORAL 3 TIMES DAILY PRN
Qty: 30 TABLET | Refills: 0 | Status: SHIPPED | OUTPATIENT
Start: 2024-11-22 | End: 2024-12-02

## 2024-11-22 RX ORDER — MECLIZINE HCL 12.5 MG 12.5 MG/1
12.5 TABLET ORAL
Status: COMPLETED | OUTPATIENT
Start: 2024-11-22 | End: 2024-11-22

## 2024-11-22 RX ORDER — ONDANSETRON HYDROCHLORIDE 2 MG/ML
4 INJECTION, SOLUTION INTRAVENOUS
Status: COMPLETED | OUTPATIENT
Start: 2024-11-22 | End: 2024-11-22

## 2024-11-22 RX ADMIN — MECLIZINE HYDROCHLORIDE 12.5 MG: 12.5 TABLET ORAL at 06:11

## 2024-11-22 RX ADMIN — ONDANSETRON 4 MG: 2 INJECTION INTRAMUSCULAR; INTRAVENOUS at 06:11

## 2024-11-22 NOTE — ED TRIAGE NOTES
Patient identifiers for Jacqui Leonard 58 y.o. female checked and correct.  Chief Complaint   Patient presents with    Nausea    Vomiting     Onset this afternoon after eating lunch  ESKD no dialysis    Dizziness     Past Medical History:   Diagnosis Date    Abnormal Pap smear     Chronic kidney disease     - Stage 3    Depression     GERD (gastroesophageal reflux disease)     Mitral valve insufficiency     Mitral valve prolapse     Obesity     Sleep apnea     Thyroid disease      Allergies reported: Review of patient's allergies indicates:  No Known Allergies      LOC: Patient is awake, alert, and aware of environment with an appropriate affect. Patient is oriented x 4 and speaking appropriately.  APPEARANCE: Patient resting comfortably and in no acute distress. Patient is clean and well groomed, patient's clothing is properly fastened.  SKIN: The skin is warm and dry. Patient has normal skin turgor and moist mucus membranes.   MUSKULOSKELETAL: Patient is moving all extremities well, no obvious deformities noted. Pulses intact.   RESPIRATORY: Airway is open and patent. Respirations are spontaneous and non-labored with normal effort and rate.  CARDIAC: Patient has a normal rate and rhythm. Normal sinus on cardiac monitor. No peripheral edema noted.   ABDOMEN: No distention noted. Soft and non-tender upon palpation. +N/V  NEUROLOGICAL:  PERRL. Facial expression is symmetrical. Hand grasps are equal bilaterally. Normal sensation in all extremities when touched with finger.

## 2024-11-23 LAB
OHS QRS DURATION: 96 MS
OHS QTC CALCULATION: 405 MS

## 2024-11-23 NOTE — ED PROVIDER NOTES
Encounter Date: 11/22/2024       History     Chief Complaint   Patient presents with    Nausea    Vomiting     Onset this afternoon after eating lunch  ESKD no dialysis    Dizziness     HPI  Ms. Leonard is a 58-year-old female with past medical history significant for your CKD stage 3, depression, GERD, mitral valve insufficiency, obesity, sleep apnea, and thyroid disease is presenting with nausea, vomiting, and dizziness.    Patient states that immediately after lunch she had onset of dizziness which she describes as room spinning.  She has a history of vertigo and states that this is significantly worse.  Denies any double vision.  Soon after she began vomiting.  She did have a couple episodes of diarrhea as well.  States that because this episode was so significant that she called EMS to be evaluated.  Denies any weakness or numbness in her extremities.  Denies any loss of consciousness though did feel generally weak during the vomiting episodes.  She is feeling better at the moment but still minimally nauseated and and reviews her vertigo went turned to the side but definitely worse on the right.  She denies any other acute concerns.  Review of patient's allergies indicates:  No Known Allergies  Past Medical History:   Diagnosis Date    Abnormal Pap smear     Chronic kidney disease     - Stage 3    Depression     GERD (gastroesophageal reflux disease)     Mitral valve insufficiency     Mitral valve prolapse     Obesity     Sleep apnea     Thyroid disease      Past Surgical History:   Procedure Laterality Date    BUNIONECTOMY      CERVICAL BIOPSY  W/ LOOP ELECTRODE EXCISION      @ 2012 with all normal follow-ups    DILATION OF CERVIX N/A 10/22/2021    Procedure: DILATION, CERVIX;  Surgeon: Elvis Dutta MD;  Location: [facility];  Service: OB/GYN;  Laterality: N/A;  evacuation of uterus, fluid    EXAMINATION UNDER ANESTHESIA N/A 10/22/2021    Procedure: Exam under anesthesia;  Surgeon: Elvis Dutta MD;   Location: [facility] OR;  Service: OB/GYN;  Laterality: N/A;    FOOT SURGERY      x2    TUBAL LIGATION       Family History   Problem Relation Name Age of Onset    Hypertension Mother      Heart attack Mother      No Known Problems Father      Bipolar disorder Daughter      Diabetes Maternal Uncle      Heart attack Maternal Uncle      Heart attack Maternal Grandfather      Breast cancer Neg Hx      Colon cancer Neg Hx      Ovarian cancer Neg Hx       Social History     Tobacco Use    Smoking status: Former     Current packs/day: 0.00     Average packs/day: 0.5 packs/day for 20.0 years (10.0 ttl pk-yrs)     Types: Cigarettes     Start date: 1990     Quit date: 2010     Years since quittin.9    Smokeless tobacco: Never   Substance Use Topics    Alcohol use: Not Currently     Alcohol/week: 3.0 - 4.0 standard drinks of alcohol     Types: 3 - 4 Shots of liquor per week     Comment: sober 1 year    Drug use: No     Review of Systems    Physical Exam     Initial Vitals [24 1708]   BP Pulse Resp Temp SpO2   (!) 164/62 (!) 56 18 98.3 °F (36.8 °C) 98 %      MAP       --         Physical Exam    Nursing note and vitals reviewed.  Constitutional: She appears well-developed and well-nourished.   HENT:   Head: Normocephalic and atraumatic.   Eyes: Conjunctivae and EOM are normal. Pupils are equal, round, and reactive to light.   Neck:   Normal range of motion.  Cardiovascular:  Normal rate, regular rhythm, normal heart sounds and intact distal pulses.           No murmur heard.  Pulmonary/Chest: Breath sounds normal. No respiratory distress. She has no wheezes. She has no rhonchi. She has no rales.   Abdominal: Abdomen is soft. Bowel sounds are normal. She exhibits no distension. There is no abdominal tenderness.   Musculoskeletal:         General: Normal range of motion.      Cervical back: Normal range of motion.     Neurological: She is alert and oriented to person, place, and time.   Skin: Skin is warm and  dry. Capillary refill takes less than 2 seconds.   Psychiatric: She has a normal mood and affect.         ED Course   Procedures  Labs Reviewed   CBC W/ AUTO DIFFERENTIAL - Abnormal       Result Value    WBC 7.64      RBC 3.39 (*)     Hemoglobin 9.9 (*)     Hematocrit 31.4 (*)     MCV 93      MCH 29.2      MCHC 31.5 (*)     RDW 13.4      Platelets 210      MPV 10.1      Immature Granulocytes 0.3      Gran # (ANC) 6.4      Immature Grans (Abs) 0.02      Lymph # 0.6 (*)     Mono # 0.5      Eos # 0.1      Baso # 0.02      nRBC 0      Gran % 84.3 (*)     Lymph % 7.7 (*)     Mono % 6.5      Eosinophil % 0.9      Basophil % 0.3      Differential Method Automated     COMPREHENSIVE METABOLIC PANEL - Abnormal    Sodium 139      Potassium 4.1      Chloride 110      CO2 18 (*)     Glucose 92      BUN 59 (*)     Creatinine 4.0 (*)     Calcium 10.0      Total Protein 7.1      Albumin 3.5      Total Bilirubin 0.2      Alkaline Phosphatase 49      AST 18      ALT 14      eGFR 12.3 (*)     Anion Gap 11     LIPASE - Abnormal    Lipase 81 (*)    URINALYSIS, REFLEX TO URINE CULTURE - Abnormal    Specimen UA Urine, Clean Catch      Color, UA Colorless (*)     Appearance, UA Clear      pH, UA 7.0      Specific Gravity, UA 1.010      Protein, UA 2+ (*)     Glucose, UA 1+ (*)     Ketones, UA Negative      Bilirubin (UA) Negative      Occult Blood UA Trace (*)     Nitrite, UA Negative      Leukocytes, UA Negative      Narrative:     Specimen Source->Urine   URINALYSIS MICROSCOPIC    RBC, UA 0      WBC, UA 1      Bacteria Rare      Squam Epithel, UA 4      Hyaline Casts, UA 0      Microscopic Comment SEE COMMENT      Narrative:     Specimen Source->Urine   TROPONIN I    Troponin I 0.006     B-TYPE NATRIURETIC PEPTIDE    BNP 47            Imaging Results    None          Medications   ondansetron injection 4 mg (4 mg Intravenous Given 11/22/24 1830)   meclizine tablet 12.5 mg (12.5 mg Oral Given 11/22/24 1830)     Medical Decision  Making  Ms. Leonard is a 58-year-old female with past medical history significant for your CKD stage 3, depression, GERD, mitral valve insufficiency, obesity, sleep apnea, and thyroid disease is presenting with nausea, vomiting, and dizziness.  On arrival patient is slightly hypertensive and Isael with a heart rate of 56.  Patient has reproducible vertigo particularly when turning to the right.  No reproducible abdominal tenderness.  Differentials at this time include food poisoning, gastroenteritis, flu, COVID, pancreatitis, UTI, electrolyte abnormality, ACS, cardiac arrhythmia, vertigo, BPPV, and stroke-though less likely given patient's symptoms are improving and she does not show any focal deficit.  Plan to obtain CBC, CMP, lipase, UA, troponin, BNP and EKG.  EKG does not show STEMI.  Troponin and BNP is normal.  Lipase is mildly elevated at 81.  But not diagnostic for pancreatitis.  CBC with hemoglobin of 9.9 which is stable.  CMP shows elevated creatinine which is within her baseline.  BUN is also elevated at 59.  UA shows trace blood with glucose and protein.  Patient was given meclizine and Zofran.  On reassessment patient reports significant improvement in her symptoms.  Shared decision-making to discharge home with meclizine and follow up with her PCP/neurologist for further evaluation of her vertigo.  Return precautions provided.           ED Course as of 11/22/24 2345 Fri Nov 22, 2024 1902 EKG 12-lead  Sinus bradycardia with a rate of 56.  Left axis deviation.  T-wave inversion of AVR, V1.  No STEMI. [AC]   1950 Lipase(!): 81 [AC]   1951 Hemoglobin(!): 9.9  Within baseline [AC]   1951 Creatinine(!): 4.0  stable [AC]   1952 Protein, UA(!): 2+ [AC]   1952 Glucose, UA(!): 1+ [AC]   1952 Blood, UA(!): Trace [AC]   2104 BNP: 47 [AC]   2104 Troponin I: 0.006 [AC]      ED Course User Index  [AC] Regine Caballero MD                           Clinical Impression:  Final diagnoses:  [R00.1]  Bradycardia  [R11.2] Nausea and vomiting, unspecified vomiting type (Primary)  [R42] Vertigo          ED Disposition Condition    Discharge Stable          ED Prescriptions       Medication Sig Dispense Start Date End Date Auth. Provider    meclizine (ANTIVERT) 25 mg tablet Take 1 tablet (25 mg total) by mouth 3 (three) times daily as needed for Dizziness. 30 tablet 11/22/2024 12/2/2024 Regine Caballero MD          Follow-up Information       Follow up With Specialties Details Why Contact Info    Rocio Lindsay MD General Practice, Internal Medicine In 1 week  [address]  [place] [address]  [phone]      [facility] - Emergency Dept Emergency Medicine  As needed [address] [address] [phone]             Regine Caballero MD  Resident  11/22/24 2393

## 2024-11-23 NOTE — PROVIDER PROGRESS NOTES - EMERGENCY DEPT.
Encounter Date: 11/22/2024    ED Physician Progress Notes          Physician Attestation Statement for Resident:  As the supervising MD   Physician Attestation Statement: I have personally seen and examined this patient.       I agree with the history unless otherwise noted.     As the supervising MD I agree with the PE unless otherwise noted.      I have reviewed and agree with the residents interpretation of the following unless otherwise noted:   I have personally reviewed and interpreted the patients laboratory studies:  Mild anemia with hemoglobin 9.9, no significant leukocytosis, lipase 81, creatinine 4.0 with baseline 4.2-4.8    I have personally reviewed and interpreted the patient's EKG:  Sinus bradycardia at 56 beats per minute, QRS 96, , no ischemic ST/T-wave changes      I have also reviewed the following:   external records:  Baseline creatinine 4.2-4.8    As the supervising MD I agree with the treatment, course, plan, and disposition unless otherwise noted.    
8

## 2024-11-23 NOTE — DISCHARGE INSTRUCTIONS
You were seen in the emergency department for vertigo and nausea/vomiting.  Based on workup and does not appear that this is from a problem with your heart or electrolytes.  It was possible that you had food poisoning or a GI bug that may have triggered the vertigo that you have had previously.  We gave you medications here that helped with her symptoms.  At this time we are going to discharge you home with meclizine which is a medication we gave you to help with your vertigo.  I recommend that you follow up with your primary care doctor for further management.    Please return emergency department if you have fainting, weakness, numbness, are unable to keep down fluids, or any new concern.

## 2024-11-25 ENCOUNTER — PATIENT OUTREACH (OUTPATIENT)
Dept: EMERGENCY MEDICINE | Facility: HOSPITAL | Age: 59
End: 2024-11-25
Payer: COMMERCIAL

## 2024-11-27 ENCOUNTER — TELEPHONE (OUTPATIENT)
Dept: CARDIOLOGY | Facility: CLINIC | Age: 59
End: 2024-11-27
Payer: COMMERCIAL

## 2024-11-28 NOTE — TELEPHONE ENCOUNTER
----- Message from Glo sent at 11/27/2024  1:56 PM CST -----  Regarding: Portal messages  Pt [phone] calling in reference to messages that were sent through her portal on 11/21 and 11/27 about test results the doctor was suppose to contact her about. Please call her by the end of the day in reference to this message.    Thanks

## 2024-11-28 NOTE — TELEPHONE ENCOUNTER
Returned pt's call and informed her that Dr Valenzuela is on vac but we did send her messages to her and she will probably respond when she returns next week. Also sent message to Dr Valenzuela regarding this pt's call. Pt agreed she will wait for her response when she returns.    Glo gary [facility] Staff  Caller: Unspecified (Today,  1:56 PM)  Pt [phone] calling in reference to messages that were sent through her portal on 11/21 and 11/27 about test results the doctor was suppose to contact her about. Please call her by the end of the day in reference to this message.    Thanks

## 2024-12-02 ENCOUNTER — TELEPHONE (OUTPATIENT)
Dept: CARDIOLOGY | Facility: CLINIC | Age: 59
End: 2024-12-02
Payer: COMMERCIAL

## 2024-12-02 DIAGNOSIS — R94.39 ABNORMAL STRESS TEST: Primary | ICD-10-CM

## 2024-12-02 NOTE — TELEPHONE ENCOUNTER
I talked to the patient. After reviewing the nuclear stress test recommendation is to get a PET as the abnormality may be artifactual. This would give us a better look at flows.       Please get that scheduled.

## 2024-12-04 ENCOUNTER — LAB VISIT (OUTPATIENT)
Dept: LAB | Facility: HOSPITAL | Age: 59
End: 2024-12-04
Payer: COMMERCIAL

## 2024-12-04 DIAGNOSIS — R94.39 ABNORMAL STRESS TEST: ICD-10-CM

## 2024-12-04 DIAGNOSIS — N18.5 STAGE 5 CHRONIC KIDNEY DISEASE NOT ON CHRONIC DIALYSIS: ICD-10-CM

## 2024-12-04 DIAGNOSIS — E78.00 HYPERCHOLESTEREMIA: ICD-10-CM

## 2024-12-04 DIAGNOSIS — Z76.82 ORGAN TRANSPLANT CANDIDATE: ICD-10-CM

## 2024-12-04 DIAGNOSIS — Z76.82 PRE-KIDNEY TRANSPLANT, LISTED: ICD-10-CM

## 2024-12-04 LAB
CHOLEST SERPL-MCNC: 156 MG/DL (ref 120–199)
CHOLEST/HDLC SERPL: 2.3 {RATIO} (ref 2–5)
HDLC SERPL-MCNC: 68 MG/DL (ref 40–75)
HDLC SERPL: 43.6 % (ref 20–50)
LDLC SERPL CALC-MCNC: 77.6 MG/DL (ref 63–159)
NONHDLC SERPL-MCNC: 88 MG/DL
TRIGL SERPL-MCNC: 52 MG/DL (ref 30–150)

## 2024-12-04 PROCEDURE — 86832 HLA CLASS I HIGH DEFIN QUAL: CPT | Mod: TXP | Performed by: NURSE PRACTITIONER

## 2024-12-04 PROCEDURE — 86833 HLA CLASS II HIGH DEFIN QUAL: CPT | Mod: TXP | Performed by: NURSE PRACTITIONER

## 2024-12-04 PROCEDURE — 80061 LIPID PANEL: CPT | Mod: NTX | Performed by: INTERNAL MEDICINE

## 2024-12-04 PROCEDURE — 36415 COLL VENOUS BLD VENIPUNCTURE: CPT | Mod: PO,NTX | Performed by: INTERNAL MEDICINE

## 2024-12-11 LAB — HPRA INTERPRETATION: NORMAL

## 2024-12-23 ENCOUNTER — PATIENT MESSAGE (OUTPATIENT)
Dept: CARDIOLOGY | Facility: CLINIC | Age: 59
End: 2024-12-23
Payer: COMMERCIAL

## 2024-12-26 NOTE — TELEPHONE ENCOUNTER
Dr Valenzuela was updated and wrote a prescription for xanax 0.5 mg x 1. Pt to  prescription around 0800 in [place] before procedure and Phyllis Tellez was update.

## 2024-12-27 ENCOUNTER — HOSPITAL ENCOUNTER (OUTPATIENT)
Dept: CARDIOLOGY | Facility: HOSPITAL | Age: 59
Discharge: HOME | End: 2024-12-27
Attending: INTERNAL MEDICINE
Payer: COMMERCIAL

## 2024-12-27 VITALS
HEIGHT: 63 IN | SYSTOLIC BLOOD PRESSURE: 168 MMHG | DIASTOLIC BLOOD PRESSURE: 76 MMHG | HEART RATE: 67 BPM | BODY MASS INDEX: 38.27 KG/M2 | WEIGHT: 216 LBS

## 2024-12-27 DIAGNOSIS — R94.39 ABNORMAL STRESS TEST: ICD-10-CM

## 2024-12-27 PROCEDURE — 63600175 PHARM REV CODE 636 W HCPCS: Mod: TXP | Performed by: INTERNAL MEDICINE

## 2024-12-27 PROCEDURE — 78431 MYOCRD IMG PET RST&STRS CT: CPT | Mod: TXP

## 2024-12-27 PROCEDURE — 78431 MYOCRD IMG PET RST&STRS CT: CPT | Mod: 26,TXP,, | Performed by: INTERNAL MEDICINE

## 2024-12-27 PROCEDURE — 93018 CV STRESS TEST I&R ONLY: CPT | Mod: TXP,,, | Performed by: INTERNAL MEDICINE

## 2024-12-27 PROCEDURE — 93016 CV STRESS TEST SUPVJ ONLY: CPT | Mod: TXP,,, | Performed by: INTERNAL MEDICINE

## 2024-12-27 PROCEDURE — 78434 AQMBF PET REST & RX STRESS: CPT | Mod: 26,TXP,, | Performed by: INTERNAL MEDICINE

## 2024-12-27 PROCEDURE — A9555 RB82 RUBIDIUM: HCPCS | Mod: TXP | Performed by: INTERNAL MEDICINE

## 2024-12-27 RX ORDER — AMINOPHYLLINE 25 MG/ML
75 INJECTION, SOLUTION INTRAVENOUS ONCE
Status: COMPLETED | OUTPATIENT
Start: 2024-12-27 | End: 2024-12-27

## 2024-12-27 RX ORDER — REGADENOSON 0.08 MG/ML
0.4 INJECTION, SOLUTION INTRAVENOUS
Status: COMPLETED | OUTPATIENT
Start: 2024-12-27 | End: 2024-12-27

## 2024-12-27 RX ADMIN — REGADENOSON 0.4 MG: 0.08 INJECTION, SOLUTION INTRAVENOUS at 09:12

## 2024-12-27 RX ADMIN — AMINOPHYLLINE 75 MG: 25 INJECTION, SOLUTION INTRAVENOUS at 09:12

## 2024-12-27 RX ADMIN — RUBIDIUM CHLORIDE RB-82 30.2 MILLICURIE: 150 INJECTION, SOLUTION INTRAVENOUS at 09:12

## 2024-12-28 LAB
CFR FLOW - ANTERIOR: 2.03
CFR FLOW - INFERIOR: 1.94
CFR FLOW - LATERAL: 1.82
CFR FLOW - MAX: 2.79
CFR FLOW - MIN: 1.57
CFR FLOW - SEPTAL: 2.14
CFR FLOW - WHOLE HEART: 1.98
CV PHARM DOSE: 0.4 MG
CV STRESS BASE HR: 67 BPM
DIASTOLIC BLOOD PRESSURE: 76 MMHG
EJECTION FRACTION- HIGH: 59 %
END DIASTOLIC INDEX-HIGH: 155 ML/M2
END DIASTOLIC INDEX-LOW: 91 ML/M2
END SYSTOLIC INDEX-HIGH: 78 ML/M2
END SYSTOLIC INDEX-LOW: 40 ML/M2
NUC REST DIASTOLIC VOLUME INDEX: 94
NUC REST EJECTION FRACTION: 71
NUC REST SYSTOLIC VOLUME INDEX: 27
NUC STRESS DIASTOLIC VOLUME INDEX: 97
NUC STRESS EJECTION FRACTION: 77 %
NUC STRESS SYSTOLIC VOLUME INDEX: 23
OHS CV CPX 85 PERCENT MAX PREDICTED HEART RATE MALE: 136
OHS CV CPX MAX PREDICTED HEART RATE: 160
OHS CV CPX PATIENT IS FEMALE: 1
OHS CV CPX PATIENT IS MALE: 0
OHS CV CPX PEAK DIASTOLIC BLOOD PRESSURE: 61 MMHG
OHS CV CPX PEAK HEAR RATE: 99 BPM
OHS CV CPX PEAK RATE PRESSURE PRODUCT: NORMAL
OHS CV CPX PEAK SYSTOLIC BLOOD PRESSURE: 147 MMHG
OHS CV CPX PERCENT MAX PREDICTED HEART RATE ACHIEVED: 65
OHS CV CPX RATE PRESSURE PRODUCT PRESENTING: NORMAL
OHS CV INITIAL DOSE: 30.2 MCG/KG/MIN
OHS CV MODERATELY REDUCED FLOW CAPACITY: 0 %
OHS CV NO ISCHEMIA MILDLY REDUCED FLOW CAPACTY: 29 %
OHS CV NO ISCHEMIA MINIMALLY REDUCED FLOW CAPACITY: 44 %
OHS CV NORMAL FLOW CAPACITY COMPARABLE TO HEALTHY YOUNG VOLUNTEERS: 28 %
OHS CV PEAK DOSE: 30.2 MCG/KG/MIN
OHS CV PET ID: 7898
OHS CV SEVERELY REDUCED FLOW CAPACITY LARGEST SINGLE CONTINUOUS REGION: 0 %
OHS CV SEVERELY REDUCED FLOW CAPACITY: 0 %
OHS CV TOTAL EXAM DLP: 480.38 MGY-CM
REST FLOW - ANTERIOR: 1.05 CC/MIN/G
REST FLOW - INFERIOR: 0.99 CC/MIN/G
REST FLOW - LATERAL: 1.26 CC/MIN/G
REST FLOW - MAX: 1.51 CC/MIN/G
REST FLOW - MIN: 0.67 CC/MIN/G
REST FLOW - SEPTAL: 0.87 CC/MIN/G
REST FLOW - WHOLE HEART: 1.04 CC/MIN/G
RETIRED EF AND QEF - SEE NOTES: 47 %
STRESS FLOW - ANTERIOR: 2.11 CC/MIN/G
STRESS FLOW - INFERIOR: 1.92 CC/MIN/G
STRESS FLOW - LATERAL: 2.27 CC/MIN/G
STRESS FLOW - MAX: 2.61 CC/MIN/G
STRESS FLOW - MIN: 1.41 CC/MIN/G
STRESS FLOW - SEPTAL: 1.86 CC/MIN/G
STRESS FLOW - WHOLE HEART: 2.04 CC/MIN/G
SYSTOLIC BLOOD PRESSURE: 168 MMHG

## 2025-01-02 ENCOUNTER — LAB VISIT (OUTPATIENT)
Dept: LAB | Facility: HOSPITAL | Age: 60
End: 2025-01-02
Payer: COMMERCIAL

## 2025-01-02 DIAGNOSIS — N18.5 CKD (CHRONIC KIDNEY DISEASE) STAGE 5, GFR LESS THAN 15 ML/MIN: ICD-10-CM

## 2025-01-02 DIAGNOSIS — Z76.82 PRE-KIDNEY TRANSPLANT, LISTED: ICD-10-CM

## 2025-01-02 LAB
ALBUMIN SERPL BCP-MCNC: 3.4 G/DL (ref 3.5–5.2)
ANION GAP SERPL CALC-SCNC: 12 MMOL/L (ref 8–16)
BASOPHILS # BLD AUTO: 0.03 K/UL (ref 0–0.2)
BASOPHILS NFR BLD: 0.6 % (ref 0–1.9)
BUN SERPL-MCNC: 65 MG/DL (ref 6–20)
CALCIUM SERPL-MCNC: 10.6 MG/DL (ref 8.7–10.5)
CHLORIDE SERPL-SCNC: 108 MMOL/L (ref 95–110)
CO2 SERPL-SCNC: 22 MMOL/L (ref 23–29)
CREAT SERPL-MCNC: 4.9 MG/DL (ref 0.5–1.4)
DIFFERENTIAL METHOD BLD: ABNORMAL
EOSINOPHIL # BLD AUTO: 0.2 K/UL (ref 0–0.5)
EOSINOPHIL NFR BLD: 4.5 % (ref 0–8)
ERYTHROCYTE [DISTWIDTH] IN BLOOD BY AUTOMATED COUNT: 14 % (ref 11.5–14.5)
EST. GFR  (NO RACE VARIABLE): 9.6 ML/MIN/1.73 M^2
GLUCOSE SERPL-MCNC: 87 MG/DL (ref 70–110)
HCT VFR BLD AUTO: 34 % (ref 37–48.5)
HGB BLD-MCNC: 10.2 G/DL (ref 12–16)
IMM GRANULOCYTES # BLD AUTO: 0.01 K/UL (ref 0–0.04)
IMM GRANULOCYTES NFR BLD AUTO: 0.2 % (ref 0–0.5)
LYMPHOCYTES # BLD AUTO: 1.3 K/UL (ref 1–4.8)
LYMPHOCYTES NFR BLD: 26.9 % (ref 18–48)
MCH RBC QN AUTO: 28.8 PG (ref 27–31)
MCHC RBC AUTO-ENTMCNC: 30 G/DL (ref 32–36)
MCV RBC AUTO: 96 FL (ref 82–98)
MONOCYTES # BLD AUTO: 0.4 K/UL (ref 0.3–1)
MONOCYTES NFR BLD: 7.5 % (ref 4–15)
NEUTROPHILS # BLD AUTO: 2.8 K/UL (ref 1.8–7.7)
NEUTROPHILS NFR BLD: 60.3 % (ref 38–73)
NRBC BLD-RTO: 0 /100 WBC
PHOSPHATE SERPL-MCNC: 5.9 MG/DL (ref 2.7–4.5)
PLATELET # BLD AUTO: 248 K/UL (ref 150–450)
PMV BLD AUTO: 10.1 FL (ref 9.2–12.9)
POTASSIUM SERPL-SCNC: 4.3 MMOL/L (ref 3.5–5.1)
PTH-INTACT SERPL-MCNC: 218.4 PG/ML (ref 9–77)
RBC # BLD AUTO: 3.54 M/UL (ref 4–5.4)
SODIUM SERPL-SCNC: 142 MMOL/L (ref 136–145)
WBC # BLD AUTO: 4.64 K/UL (ref 3.9–12.7)

## 2025-01-02 PROCEDURE — 83970 ASSAY OF PARATHORMONE: CPT | Mod: TXP | Performed by: INTERNAL MEDICINE

## 2025-01-02 PROCEDURE — 85025 COMPLETE CBC W/AUTO DIFF WBC: CPT | Mod: TXP | Performed by: INTERNAL MEDICINE

## 2025-01-02 PROCEDURE — 99001 SPECIMEN HANDLING PT-LAB: CPT | Mod: TXP | Performed by: NURSE PRACTITIONER

## 2025-01-02 PROCEDURE — 36415 COLL VENOUS BLD VENIPUNCTURE: CPT | Mod: PO,TXP | Performed by: INTERNAL MEDICINE

## 2025-01-02 PROCEDURE — 80069 RENAL FUNCTION PANEL: CPT | Mod: TXP | Performed by: INTERNAL MEDICINE

## 2025-01-06 ENCOUNTER — PATIENT MESSAGE (OUTPATIENT)
Dept: CARDIOLOGY | Facility: CLINIC | Age: 60
End: 2025-01-06
Payer: COMMERCIAL

## 2025-01-15 ENCOUNTER — TELEPHONE (OUTPATIENT)
Dept: TRANSPLANT | Facility: CLINIC | Age: 60
End: 2025-01-15
Payer: COMMERCIAL

## 2025-01-15 ENCOUNTER — PATIENT MESSAGE (OUTPATIENT)
Dept: TRANSPLANT | Facility: CLINIC | Age: 60
End: 2025-01-15
Payer: COMMERCIAL

## 2025-01-17 ENCOUNTER — OFFICE VISIT (OUTPATIENT)
Dept: NEPHROLOGY | Facility: CLINIC | Age: 60
End: 2025-01-17
Payer: COMMERCIAL

## 2025-01-17 DIAGNOSIS — N05.1 FSGS (FOCAL SEGMENTAL GLOMERULOSCLEROSIS): ICD-10-CM

## 2025-01-17 DIAGNOSIS — N18.5 CKD (CHRONIC KIDNEY DISEASE) STAGE 5, GFR LESS THAN 15 ML/MIN: ICD-10-CM

## 2025-01-17 DIAGNOSIS — N25.81 SECONDARY RENAL HYPERPARATHYROIDISM: ICD-10-CM

## 2025-01-17 DIAGNOSIS — R80.1 PERSISTENT PROTEINURIA: Primary | ICD-10-CM

## 2025-01-17 DIAGNOSIS — N28.1 ACQUIRED CYST OF KIDNEY: ICD-10-CM

## 2025-01-17 DIAGNOSIS — E66.01 SEVERE OBESITY: ICD-10-CM

## 2025-01-17 NOTE — PROGRESS NOTES
Subjective:       Patient ID: Jacqui Leonard is a 59 y.o. White female who presents for return patient evaluation for chronic renal failure.    The patient location is:  Patient Home   The chief complaint leading to consultation is: ckd  Visit type: Virtual visit with synchronous audio and video  Total time spent with patient: 11 minutes  Each patient to whom he or she provides medical services by telemedicine is:  (1) informed of the relationship between the physician and patient and the respective role of any other health care provider with respect to management of the patient; and (2) notified that he or she may decline to receive medical services by telemedicine and may withdraw from such care at any time.       There have been no recent hospitalizations or procedures.  She had a renal biopsy in 2006.  She had COVID in June 2022.  Her donor was rejected earlier this year.  She is feeling well currently.  Her blood pressure at home is typically 110-130/70s.  She has fatigue.      Review of Systems   Constitutional:  Positive for fatigue. Negative for appetite change, chills and fever.   HENT:  Negative for congestion.    Eyes:  Negative for visual disturbance.   Respiratory:  Positive for shortness of breath (with exertion). Negative for cough.    Cardiovascular:  Negative for chest pain and leg swelling.   Gastrointestinal:  Positive for constipation. Negative for nausea.   Genitourinary:  Negative for difficulty urinating, dysuria and hematuria.   Musculoskeletal:  Positive for gait problem and neck pain. Negative for arthralgias and back pain.   Skin:  Negative for rash.   Neurological:  Positive for headaches (occasional).   Psychiatric/Behavioral:  Positive for decreased concentration (brain fog) and sleep disturbance. The patient is nervous/anxious.        The past medical, family and social histories were reviewed for this encounter     There were no vitals taken for this visit.    Objective:       Physical Exam  Vitals reviewed.   Constitutional:       General: She is not in acute distress.     Appearance: She is well-developed.   HENT:      Head: Normocephalic and atraumatic.   Eyes:      General: No scleral icterus.  Pulmonary:      Effort: Pulmonary effort is normal.   Neurological:      Mental Status: She is alert and oriented to person, place, and time.   Psychiatric:         Mood and Affect: Mood normal.         Behavior: Behavior normal.        Assessment:       1. Persistent proteinuria    2. CKD (chronic kidney disease) stage 5, GFR less than 15 ml/min    3. Acquired cyst of kidney    4. Secondary renal hyperparathyroidism    5. Severe obesity    6. FSGS (focal segmental glomerulosclerosis)        Lab Results   Component Value Date    CREATININE 4.9 (H) 01/02/2025    BUN 65 (H) 01/02/2025     01/02/2025    K 4.3 01/02/2025     01/02/2025    CO2 22 (L) 01/02/2025     Lab Results   Component Value Date    .4 (H) 01/02/2025    CALCIUM 10.6 (H) 01/02/2025    PHOS 5.9 (H) 01/02/2025     Lab Results   Component Value Date    HCT 34.0 (L) 01/02/2025     Prot/Creat Ratio, Urine   Date Value Ref Range Status   03/30/2023 3.02 (H) 0.00 - 0.20 Final   08/02/2022 5.38 (H) 0.00 - 0.20 Final   04/06/2022 1.92 (H) 0.00 - 0.20 Final     Plan:   Return to clinic in 4 weeks - make her appointments on the second week of the month.  Labs for next time includes rp upc at the [place] location on weekday or [facility] on weekend ([facility]).   Baseline creatinine is 1.2-1.5 prior to 2016.  She has been 1.8-2.4 since 2020.  Since then she has been progressing.  PTH is 218 with a calcium of 10.6.  Continue calcitriol 0.5 mcg daily.  Continue binder.  UPC is 3.0.  She used to be on an ACE I but is not on one currently due to low blood pressure.  Re-biopsy was not done due to increased bleeding risk.  Renal US shows R 10.5 cm L 10.7 cm.  [facility] referral was been ordered/completed.  We discussed making  lifestyle changes and using a Mediterranean diet for health benefits and weight loss.  She has been referred to Transplant and is listed.  She will do PD when she needs it.  I have asked the PD nurse to contact her.  Plan to admit to [facility] for her to start PD when she gets a PD catheter.  We are going to go another month since her labs are acceptable and she has no new symptoms.

## (undated) DEVICE — TRAY DRY SKIN SCRUB PREP

## (undated) DEVICE — SCRUB 10% POVIDONE IODINE 4OZ

## (undated) DEVICE — SOL BETADINE 5%

## (undated) DEVICE — GLOVE BIOGEL SKINSENSE PI 7.5

## (undated) DEVICE — NDL SAFETY 21G X 1 1/2 ECLPSE

## (undated) DEVICE — CATH IV CATHLON W/HUB14GAX

## (undated) DEVICE — SYR 10CC LUER LOCK

## (undated) DEVICE — Device

## (undated) DEVICE — SOL PVP-I SCRUB 7.5% 4OZ

## (undated) DEVICE — JELLY SURGILUBE 5GR

## (undated) DEVICE — SOL NACL STRL BOTTLE 1000ML